# Patient Record
Sex: MALE | Race: BLACK OR AFRICAN AMERICAN | Employment: UNEMPLOYED | URBAN - METROPOLITAN AREA
[De-identification: names, ages, dates, MRNs, and addresses within clinical notes are randomized per-mention and may not be internally consistent; named-entity substitution may affect disease eponyms.]

---

## 2017-08-16 ENCOUNTER — TELEPHONE (OUTPATIENT)
Dept: TRANSPLANT | Facility: CLINIC | Age: 27
End: 2017-08-16

## 2017-08-16 DIAGNOSIS — I50.9 CONGESTIVE HEART FAILURE, UNSPECIFIED CONGESTIVE HEART FAILURE CHRONICITY, UNSPECIFIED CONGESTIVE HEART FAILURE TYPE: Primary | ICD-10-CM

## 2017-08-16 NOTE — TELEPHONE ENCOUNTER
"REFERRAL NOTE:    Levon Arizmendi has been referred to the pre-heart transplant office for consideration for orthotopic heart transplantation by Dr. Alejandra Alonso. Patient's appointments have been scheduled for 08/28/17.  Information was provided and questions were answered.  Copies of "What to Expect", appointment letter and directions to [facility]/ Transplant Clinic  were mailed to the patient. My contact information was given. Pleasant and polite. Call PRN.  "

## 2017-08-17 ENCOUNTER — TELEPHONE (OUTPATIENT)
Dept: TRANSPLANT | Facility: CLINIC | Age: 27
End: 2017-08-17

## 2017-08-17 NOTE — TELEPHONE ENCOUNTER
Spoke to Nesha of [facility]. Informed of patient's upcoming appointment 08/28/17.

## 2017-08-28 ENCOUNTER — INITIAL CONSULT (OUTPATIENT)
Dept: TRANSPLANT | Facility: CLINIC | Age: 27
End: 2017-08-28
Payer: MEDICAID

## 2017-08-28 ENCOUNTER — HOSPITAL ENCOUNTER (OUTPATIENT)
Dept: PULMONOLOGY | Facility: CLINIC | Age: 27
Discharge: HOME | End: 2017-08-28
Payer: MEDICAID

## 2017-08-28 VITALS
DIASTOLIC BLOOD PRESSURE: 86 MMHG | BODY MASS INDEX: 50.23 KG/M2 | SYSTOLIC BLOOD PRESSURE: 135 MMHG | HEIGHT: 63 IN | HEART RATE: 97 BPM | WEIGHT: 283.5 LBS

## 2017-08-28 VITALS — HEIGHT: 63 IN | BODY MASS INDEX: 50.14 KG/M2 | WEIGHT: 283 LBS

## 2017-08-28 DIAGNOSIS — E66.01 MORBID OBESITY DUE TO EXCESS CALORIES: ICD-10-CM

## 2017-08-28 DIAGNOSIS — I10 ESSENTIAL HYPERTENSION: ICD-10-CM

## 2017-08-28 DIAGNOSIS — Z76.82 ORGAN TRANSPLANT CANDIDATE: ICD-10-CM

## 2017-08-28 DIAGNOSIS — I50.22 CHRONIC SYSTOLIC CONGESTIVE HEART FAILURE, NYHA CLASS 3: ICD-10-CM

## 2017-08-28 DIAGNOSIS — I50.22 CHRONIC SYSTOLIC CONGESTIVE HEART FAILURE, NYHA CLASS 3: Primary | ICD-10-CM

## 2017-08-28 DIAGNOSIS — G47.33 OSA (OBSTRUCTIVE SLEEP APNEA): ICD-10-CM

## 2017-08-28 PROCEDURE — 94620 PR PULMONARY STRESS TESTING,SIMPLE: CPT | Mod: 26,S$PBB,TXP, | Performed by: INTERNAL MEDICINE

## 2017-08-28 PROCEDURE — 99205 OFFICE O/P NEW HI 60 MIN: CPT | Mod: S$PBB,TXP,, | Performed by: INTERNAL MEDICINE

## 2017-08-28 PROCEDURE — 94620 PR PULMONARY STRESS TESTING,SIMPLE: CPT | Mod: PBBFAC,TXP | Performed by: INTERNAL MEDICINE

## 2017-08-28 PROCEDURE — 3008F BODY MASS INDEX DOCD: CPT | Mod: TXP,,, | Performed by: INTERNAL MEDICINE

## 2017-08-28 PROCEDURE — 99999 PR PBB SHADOW E&M-EST. PATIENT-LVL III: CPT | Mod: PBBFAC,TXP,, | Performed by: INTERNAL MEDICINE

## 2017-08-28 RX ORDER — LISINOPRIL 40 MG/1
40 TABLET ORAL DAILY
COMMUNITY
Start: 2017-07-10 | End: 2017-09-12 | Stop reason: ALTCHOICE

## 2017-08-28 RX ORDER — SPIRONOLACTONE 25 MG/1
25 TABLET ORAL DAILY
Status: ON HOLD | COMMUNITY
Start: 2017-07-10 | End: 2018-01-01 | Stop reason: HOSPADM

## 2017-08-28 RX ORDER — NAPROXEN SODIUM 220 MG/1
81 TABLET, FILM COATED ORAL DAILY
Status: ON HOLD | COMMUNITY
Start: 2017-07-10 | End: 2018-01-01 | Stop reason: HOSPADM

## 2017-08-28 RX ORDER — AMLODIPINE BESYLATE 10 MG/1
10 TABLET ORAL DAILY
COMMUNITY
Start: 2017-03-02 | End: 2017-11-03 | Stop reason: ALTCHOICE

## 2017-08-28 RX ORDER — LATANOPROST 50 UG/ML
1 SOLUTION/ DROPS OPHTHALMIC NIGHTLY
COMMUNITY
Start: 2017-01-11 | End: 2018-01-01

## 2017-08-28 RX ORDER — PERMETHRIN 50 MG/G
CREAM TOPICAL ONCE
COMMUNITY
End: 2017-10-17

## 2017-08-28 RX ORDER — CARVEDILOL 12.5 MG/1
25 TABLET ORAL 2 TIMES DAILY
COMMUNITY
Start: 2017-07-26 | End: 2017-10-20 | Stop reason: SDUPTHER

## 2017-08-28 RX ORDER — LORATADINE 10 MG/1
10 TABLET ORAL DAILY
Status: ON HOLD | COMMUNITY
Start: 2017-03-02 | End: 2018-01-01 | Stop reason: HOSPADM

## 2017-08-28 RX ORDER — NITROGLYCERIN 0.4 MG/1
TABLET SUBLINGUAL
Status: ON HOLD | COMMUNITY
Start: 2017-07-06 | End: 2018-01-01 | Stop reason: HOSPADM

## 2017-08-28 RX ORDER — FLUTICASONE PROPIONATE 50 MCG
1 SPRAY, SUSPENSION (ML) NASAL DAILY
COMMUNITY
Start: 2017-08-23 | End: 2017-09-22

## 2017-08-28 RX ORDER — FUROSEMIDE 20 MG/1
40 TABLET ORAL DAILY
COMMUNITY
Start: 2017-07-26 | End: 2017-10-20 | Stop reason: SDUPTHER

## 2017-08-28 RX ORDER — GABAPENTIN 400 MG/1
400 CAPSULE ORAL 3 TIMES DAILY
Status: ON HOLD | COMMUNITY
Start: 2017-03-02 | End: 2018-01-01 | Stop reason: HOSPADM

## 2017-08-28 NOTE — PROGRESS NOTES
PRE-EDUCATION BOOKLET NOTE:    Met with Levon Arizmendi and his aunt and had a brief discussion on the heart transplant evaluation process.    Heart Transplant Educational Booklet given to patient, which included the following handouts:  · Cardiomyopathy and Heart Transplantation  · Pre-transplant Evaluation Process  · Ventricular Assist Devices  · Wellness Contract  · Heart Transplant Information Outline  · Recipient Informed Consent  · Discharge Instructions for Patients with Heart Failure  · Advanced Directives  · Suggested web sites  · Multiple listing protocol and UNOS toll free numbers    Contact information provided.  All questions answered to patient's satisfaction.   Patient instructed to bring heart transplant educational booklet to his next visit.       Plan is for risk stratification testing in a few weeks.

## 2017-08-28 NOTE — PROGRESS NOTES
Subjective:   Initial evaluation of heart transplant candidacy.    HPI:  Mr. Arizmendi is a 27 y.o. 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 He was first diagnosed with CMP in 2009 he things. He also has HTN, KAMILLE (on CPAP), HLD, Migraines, and h/o syncope in 2012. He does not have an ICD but does have a life vest for with a f/u appt with EP for ICD possible implant. He had a MPS 3/2017 concerning for ischemic defect but coronary angiography revealed normal coronaries 5/2017. He was last admitted 7/7-7/10 of this year with a NSTEMI. Repeat LHC again showed normal coronaries. His EF has been declining despite GDMT and he was referred to us for evaluation for advanced options. He lives alone at home but has aunts and siblings who live in [place] with him. He used to walk regularly but has cut back due to being told not to stress his body. He has occasional chest pain/pressure that occurs every few months. He has been admitted 3x this year for CP. In two of those admissions he underwent LHC with normal coronaries as above. He reports currently having stable 3-pillow orthopnea, no PND, no syncope since 2012, no CP today; He reports occasional wt fluctuations but takes an extra lasix if his legs swell too much. He denies nausea or abd pain. No other complaints.   --Of note, he did not take any of his meds this am    Echo 8/1/17:  - EF 20-25%  - LVEDD 7.1cm  - Mod-severe MR    [facility] 7/2017:  - Normal coronaries  - LVEDP 22mmhg      Past Medical History:   Diagnosis Date    Cardiomyopathy     Hyperlipidemia     Hypertension     Obesity     Systolic heart failure secondary to idiopathic cardiomyopathy      History reviewed. No pertinent surgical history.    Family History   Problem Relation Age of Onset    Heart disease Maternal Grandmother     Heart disease Maternal Grandfather       Social History  "    Social History    Marital status: Unknown     Spouse name: N/A    Number of children: N/A    Years of education: N/A     Social History Main Topics    Smoking status: Never Smoker    Smokeless tobacco: Never Used    Alcohol use No    Drug use: No    Sexual activity: Not Currently     Other Topics Concern    None     Social History Narrative    None       Review of Systems   Constitution: Negative for chills, fever, malaise/fatigue, night sweats and weight loss.   HENT: Negative for congestion, headaches and sore throat.    Eyes: Negative for blurred vision and visual disturbance.   Cardiovascular: Positive for dyspnea on exertion (NYHA Class III) and leg swelling. Negative for chest pain, near-syncope, orthopnea, palpitations, paroxysmal nocturnal dyspnea and syncope.   Respiratory: Negative for cough, shortness of breath and wheezing.    Endocrine: Negative for cold intolerance and heat intolerance.   Hematologic/Lymphatic: Negative for adenopathy.   Skin: Negative for itching and rash.   Musculoskeletal: Negative for arthritis, back pain, joint pain and myalgias.   Gastrointestinal: Negative for bloating, abdominal pain, change in bowel habit, nausea and vomiting.   Genitourinary: Negative for dysuria and hesitancy.   Neurological: Negative for dizziness, numbness and sensory change.   Psychiatric/Behavioral: Negative for altered mental status and depression.       Objective:   Blood pressure 135/86, pulse 97, height 5' 3" (1.6 m), weight 128.6 kg (283 lb 8.2 oz).body mass index is 50.22 kg/m².    Physical Exam   Constitutional: He is oriented to person, place, and time. He appears well-developed and well-nourished. No distress.   Obese   HENT:   Head: Normocephalic.   Mouth/Throat: Oropharynx is clear and moist.   Eyes: Pupils are equal, round, and reactive to light. No scleral icterus.   Neck: Normal range of motion. JVD (JVP ~9cm above RA) present. No thyromegaly present.   Cardiovascular: Normal " rate, regular rhythm and normal heart sounds.  Exam reveals no gallop and no friction rub.    No murmur heard.  Pulmonary/Chest: Effort normal. No respiratory distress. He has no wheezes. He has no rales.   Abdominal: Soft. He exhibits no distension. There is no tenderness. There is no rebound and no guarding.   Musculoskeletal: Normal range of motion. He exhibits no edema (trace pitting to knee bilaterally), tenderness or deformity.   Neurological: He is alert and oriented to person, place, and time. No cranial nerve deficit. He exhibits normal muscle tone. Coordination normal.   Skin: Skin is warm and dry. No rash noted. No erythema.   Psychiatric: He has a normal mood and affect.       Labs:      Chemistry        Component Value Date/Time     08/28/2017 0725    K 4.7 08/28/2017 0725     08/28/2017 0725    CO2 29 08/28/2017 0725    BUN 15 08/28/2017 0725    CREATININE 1.2 08/28/2017 0725     08/28/2017 0725        Component Value Date/Time    CALCIUM 9.5 08/28/2017 0725    ALKPHOS 47 (L) 08/28/2017 0725    AST 25 08/28/2017 0725    ALT 44 08/28/2017 0725    BILITOT 0.8 08/28/2017 0725    ESTGFRAFRICA >60.0 08/28/2017 0725    EGFRNONAA >60.0 08/28/2017 0725          No results found for: MG  Lab Results   Component Value Date    WBC 5.67 08/28/2017    HGB 15.3 08/28/2017    HCT 44.6 08/28/2017    MCV 80 (L) 08/28/2017     08/28/2017     BNP   Date Value Ref Range Status   08/28/2017 297 (H) 0 - 99 pg/mL Final     Comment:     Values of less than 100 pg/ml are consistent with non-CHF populations.     No results found for this or any previous visit.    Labs were reviewed with the patient.    Assessment:      1. Chronic systolic congestive heart failure, NYHA class 3    2. Morbid obesity due to excess calories    3. KAMILLE (obstructive sleep apnea)    4. Essential hypertension    5. Organ transplant candidate        Plan:   Pt appears euvolemic and compensated today. Labs stable with Scr 1.2  -  will order risk stratification testing today--6MWT, RHC, CPX  - will see in clinic 1-2 weeks after above  - there is some concern for chronic myocarditis given elevated troponin last month; will consider CMR as well though do not feel that it will change our management  - Has room today to up-titrate meds but did not take his meds today; I asked patient to take all his meds prior to next visit. We will see what HR/BP look like and adjust meds prn.    Patient is now NYHA III ACC stage D  Recommend 2 gram sodium restriction and 1500cc fluid restriction.  Encourage physical activity with graded exercise program.  Requested patient to weigh themselves daily, and to notify us if their weight increases by more than 3 lbs in 1 day or 5 lbs in 1 week.     Transplant Candidacy: Patient is a 27 y.o. year old male with heart failure is being seen for possible LVAD and OHT. In my opinion, he is  a marginal LVAD and OHT candidate. Patient did meet with MCS and/or pre-transplant coordinator at the end of this visit for workup. he is scheduled for risk stratification testing.    UNOS Patient Status  Functional Status: 80% - Normal activity with effort: some symptoms of disease  Physical Capacity: No Limitations  Working for Income: Unknown    Jose J Cheney MD

## 2017-08-28 NOTE — LETTER
August 28, 2017        Shane Alonso  [address] [facility] [address]  Phone: [phone]  Fax: [phone]             [facility] [address]  [place] [address]  Phone: [phone]   Patient: Levon Arizmendi   MR Number: [id number]   YOB: 1990   Date of Visit: 8/28/2017       Dear Dr. Shane Alonso    Thank you for referring Levon Arizmendi to me for evaluation. Attached you will find relevant portions of my assessment and plan of care.    If you have questions, please do not hesitate to call me. I look forward to following Levon Arizmendi along with you.    Sincerely,    Jose J Cheney MD    Enclosure    If you would like to receive this communication electronically, please contact [email or web address] or [phone] to request [employer] Link access.    Dis[employer] Link is a tool which provides read-only access to select patient information with whom you have a relationship. Its easy to use and provides real time access to review your patients record including encounter summaries, notes, results, and demographic information.    If you feel you have received this communication in error or would no longer like to receive these types of communications, please e-mail [email or web address]

## 2017-08-29 NOTE — PROCEDURES
Levon Arizmendi is a 27 y.o.  male patient, who presents for a 6 minute walk test ordered by MD. Shravan.  The diagnosis is Pulmonary Hypertension.  The patient's BMI is 50.2 kg/m2.  Predicted distance (lower limit of normal) is 518 meters.      Test Results:    The test was completed with stops.  The patient stopped 2 times for a total of 59 seconds.  The total time walked was 301 seconds.  During walking, the patient reported:  Dyspnea. The patient used no assistive devices  during testing.     08/28/2017---------Distance: 289.86 meters (951 feet)     O2 Sat % Supplemental Oxygen Heart Rate Blood Pressure Erica Scale   Pre-exercise  (Resting) 99 % Room Air 98 bpm 123/82 mmHg 0.5   During Exercise 98 % Room Air 93 bpm (!) 132/100 mmHg 3   Post-exercise  (Recovery) 99 % Room Air  116 bpm (!) 131/92 mmHg       Recovery Time: 56 seconds    Performing nurse/tech: ALBIN Ferrari      PREVIOUS STUDY:   The patient has not had a previous study.      CLINICAL INTERPRETATION:  Six minute walk distance is 289.86 meters (951 feet) with moderate dyspnea.  During exercise, there was no significant desaturation while breathing room air.  Both blood pressure and heart rate remained stable with walking.  Tachycardia was present prior to exercise.  The patient did not report non-pulmonary symptoms during exercise.  Significant exercise impairment is likely due to subjective symptoms.  The patient did complete the study, walking 301 seconds of the 360 second test.  No previous study performed.  Based upon age and body mass index, exercise capacity is less than predicted.

## 2017-09-12 ENCOUNTER — SURGERY (OUTPATIENT)
Age: 27
End: 2017-09-12

## 2017-09-12 ENCOUNTER — HOSPITAL ENCOUNTER (OUTPATIENT)
Dept: CARDIOLOGY | Facility: CLINIC | Age: 27
Discharge: HOME | End: 2017-09-12
Payer: MEDICAID

## 2017-09-12 ENCOUNTER — HOSPITAL ENCOUNTER (OUTPATIENT)
Facility: HOSPITAL | Age: 27
Discharge: HOME | End: 2017-09-12
Attending: INTERNAL MEDICINE | Admitting: INTERNAL MEDICINE
Payer: MEDICAID

## 2017-09-12 ENCOUNTER — OFFICE VISIT (OUTPATIENT)
Dept: TRANSPLANT | Facility: CLINIC | Age: 27
End: 2017-09-12
Payer: MEDICAID

## 2017-09-12 VITALS
BODY MASS INDEX: 49.26 KG/M2 | SYSTOLIC BLOOD PRESSURE: 132 MMHG | HEIGHT: 63 IN | DIASTOLIC BLOOD PRESSURE: 83 MMHG | HEART RATE: 85 BPM | WEIGHT: 278 LBS

## 2017-09-12 DIAGNOSIS — E66.01 MORBID OBESITY DUE TO EXCESS CALORIES: ICD-10-CM

## 2017-09-12 DIAGNOSIS — I50.42 CHRONIC COMBINED SYSTOLIC AND DIASTOLIC HEART FAILURE: ICD-10-CM

## 2017-09-12 DIAGNOSIS — Z76.82 ORGAN TRANSPLANT CANDIDATE: ICD-10-CM

## 2017-09-12 DIAGNOSIS — E66.01 MORBID (SEVERE) OBESITY DUE TO EXCESS CALORIES: ICD-10-CM

## 2017-09-12 DIAGNOSIS — I50.22 CHRONIC SYSTOLIC CONGESTIVE HEART FAILURE, NYHA CLASS 3: ICD-10-CM

## 2017-09-12 DIAGNOSIS — I50.22 CHRONIC SYSTOLIC CONGESTIVE HEART FAILURE, NYHA CLASS 3: Primary | ICD-10-CM

## 2017-09-12 DIAGNOSIS — I10 ESSENTIAL HYPERTENSION: ICD-10-CM

## 2017-09-12 PROBLEM — I42.0 COCM (CONGESTIVE CARDIOMYOPATHY): Status: ACTIVE | Noted: 2017-09-12

## 2017-09-12 LAB — DIASTOLIC DYSFUNCTION: NO

## 2017-09-12 PROCEDURE — 99213 OFFICE O/P EST LOW 20 MIN: CPT | Mod: PBBFAC,TXP,25 | Performed by: INTERNAL MEDICINE

## 2017-09-12 PROCEDURE — G0378 HOSPITAL OBSERVATION PER HR: HCPCS | Mod: NTX

## 2017-09-12 PROCEDURE — 94621 CARDIOPULM EXERCISE TESTING: CPT | Mod: PBBFAC,NTX | Performed by: INTERNAL MEDICINE

## 2017-09-12 PROCEDURE — 93451 RIGHT HEART CATH: CPT | Mod: 26,NTX,, | Performed by: INTERNAL MEDICINE

## 2017-09-12 PROCEDURE — 99999 PR PBB SHADOW E&M-EST. PATIENT-LVL III: CPT | Mod: PBBFAC,TXP,, | Performed by: INTERNAL MEDICINE

## 2017-09-12 PROCEDURE — 25000003 PHARM REV CODE 250: Mod: TXP

## 2017-09-12 PROCEDURE — 3008F BODY MASS INDEX DOCD: CPT | Mod: NTX,,, | Performed by: INTERNAL MEDICINE

## 2017-09-12 PROCEDURE — C1894 INTRO/SHEATH, NON-LASER: HCPCS | Mod: NTX

## 2017-09-12 PROCEDURE — 99214 OFFICE O/P EST MOD 30 MIN: CPT | Mod: S$PBB,NTX,, | Performed by: INTERNAL MEDICINE

## 2017-09-12 NOTE — PROGRESS NOTES
Subjective:    Patient ID:  Levon Arizmendi is a 27 y.o. male who presents for follow up of NiCMP with chronic systolic HF.     CC: Dyspnea on exertion    HPI:  Mr. Arizmendi is a 27 y.o. 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 He was first diagnosed with CMP in 2009 he thinks. He also has HTN, KAMILLE (on CPAP), HLD, Migraines, and h/o syncope in 2012. He does not have an ICD but does have a life vest for with a f/u appt with EP for possible ICD implant. He had a MPS 3/2017 concerning for ischemic defect but coronary angiography revealed normal coronaries 5/2017. He was last admitted 7/7-7/10 of this year with a NSTEMI. Repeat LHC again showed normal coronaries. His EF has been declining despite GDMT and he was referred to us for evaluation for advanced options. He was last seen by me 8/28/17 for initial evaluation. He is set up for risk stratification testing today. He's had his CPX but RHC is this afternoon.     Since last visit, he reports doing well today. He has stable 2-pillow orthopnea, no PND, no syncope since 2012, no CP today; He reports occasional wt fluctuations but takes an extra lasix if his legs swell too much. He had some +n/v yesterday but none today; No f/c, not sure if it was because of nerves about today or something he ate. No other complaints.     Echo 8/1/17:  - EF 20-25%  - LVEDD 7.1cm  - Mod-severe MR    C 7/2017:  - Normal coronaries  - LVEDP 22mmhg    RHC today: pending    CPX today:  Metabolic Findings:  1)  Resting spirometry reveals an FVC = 2.8L which is 72% of predicted, an FEV1 of 2.2L, which is 67% of predicted and an FEV1/FVC ratio of 79%. The MVV = 89 L/min, which is 56% of predicted.  2) The respiratory exchange ratio (RER) was 0.90, suggesting poor effort.  3) The breathing reserve is calculated at 7%, which is reduced. Oxygen saturation with exercise became reduced  "reaching a elicia of 88% from a baseline value of 96%.   4) The PkVO2 was 17.4 ml/kg/min which is 39% of predicted equating to a functional capacity of 5.0 METS indicating severe functional impairment.   5) The anaerobic threshold (AT), which occurred at a heart rate of 122bpm, was 13.8 ml/kg/min, which is 31% of the predicted VO2 and is reduced.   6) The PkVO2 Lean was 22.89 ml/kg of lean body weight/min indicating a good prognosis in heart failure.   7) The VE/VCO2 decreased by -5% from rest to AT. The VE/VCO2 [place] was 35.4.  The Resting PetCO2 was 34.9.      CPX Conclusions:  -Severe functional impairment associated with a reduced breathing reserve, reduced oxygen saturation with exercise, poor effort, and a reduced AT. These findings are indicative of functional impairment secondary to circulatory insufficiency and poor   effort.       Review of Systems   Constitution: Negative for chills, fever, malaise/fatigue, night sweats and weight loss.   HENT: Negative for congestion and sore throat.    Eyes: Negative for blurred vision and visual disturbance.   Cardiovascular: Positive for dyspnea on exertion (NYHA Class III) and leg swelling. Negative for chest pain, near-syncope, orthopnea, palpitations, paroxysmal nocturnal dyspnea and syncope.   Respiratory: Negative for cough, shortness of breath and wheezing.    Endocrine: Negative for cold intolerance and heat intolerance.   Hematologic/Lymphatic: Negative for adenopathy.   Skin: Negative for itching and rash.   Musculoskeletal: Negative for arthritis, back pain, joint pain and myalgias.   Gastrointestinal: Negative for bloating, abdominal pain, change in bowel habit, nausea and vomiting.   Genitourinary: Negative for dysuria and hesitancy.   Neurological: Negative for dizziness, headaches, numbness and sensory change.   Psychiatric/Behavioral: Negative for altered mental status and depression.       Objective:   Blood pressure 132/83, pulse 85, height 5' 3" (1.6 " m), weight 126.1 kg (278 lb).body mass index is 49.25 kg/m².    Physical Exam   Constitutional: He is oriented to person, place, and time. He appears well-developed and well-nourished. No distress.   Obese   HENT:   Head: Normocephalic.   Mouth/Throat: Oropharynx is clear and moist.   Eyes: Pupils are equal, round, and reactive to light. No scleral icterus.   Neck: Normal range of motion. JVD (JVP ~6cm above RA) present. No thyromegaly present.   Cardiovascular: Normal rate, regular rhythm and normal heart sounds.  Exam reveals no gallop and no friction rub.    No murmur heard.  Pulmonary/Chest: Effort normal. No respiratory distress. He has no wheezes. He has no rales.   Abdominal: Soft. He exhibits no distension. There is no tenderness. There is no rebound and no guarding.   Musculoskeletal: Normal range of motion. He exhibits no edema, tenderness or deformity.   Neurological: He is alert and oriented to person, place, and time. No cranial nerve deficit. He exhibits normal muscle tone. Coordination normal.   Skin: Skin is warm and dry. No rash noted. No erythema.   Psychiatric: He has a normal mood and affect.       Labs:      Chemistry        Component Value Date/Time     09/12/2017 0749    K 4.0 09/12/2017 0749     09/12/2017 0749    CO2 27 09/12/2017 0749    BUN 18 09/12/2017 0749    CREATININE 1.3 09/12/2017 0749    GLU 87 09/12/2017 0749        Component Value Date/Time    CALCIUM 9.5 09/12/2017 0749    ALKPHOS 48 (L) 09/12/2017 0749    AST 23 09/12/2017 0749    ALT 33 09/12/2017 0749    BILITOT 0.7 09/12/2017 0749    ESTGFRAFRICA >60.0 09/12/2017 0749    EGFRNONAA >60.0 09/12/2017 0749          No results found for: MG  Lab Results   Component Value Date    WBC 6.42 09/12/2017    HGB 14.2 09/12/2017    HCT 41.1 09/12/2017    MCV 80 (L) 09/12/2017     09/12/2017     BNP   Date Value Ref Range Status   09/12/2017 203 (H) 0 - 99 pg/mL Final     Comment:     Values of less than 100 pg/ml are  consistent with non-CHF populations.   08/28/2017 297 (H) 0 - 99 pg/mL Final     Comment:     Values of less than 100 pg/ml are consistent with non-CHF populations.     No results found for this or any previous visit.    Labs were reviewed with the patient.    Assessment:      1. Chronic systolic congestive heart failure, NYHA class 3    2. Essential hypertension    3. Morbid obesity due to excess calories    4. Organ transplant candidate        Plan:   Pt appears euvolemic and compensated today. Labs stable with Scr 1.3  - will order risk stratification testing today--RHC scheduled for later today   - CPX with poor effort, PkVO2 17, Ve/VCO2 slope 35  - BP and HR up today; will d/c lisinopril and start high-dose Entresto 97/103 BID--instructed to not start until tomorrow night (last dose of lisinopril this am) to allow for appropriate 36hour washout; educated on side effects  - will f/u RHC from today but plan to see back in 3 months for possible further medication titration  - discussed at length his need to lose weight as he is currently not a candidate for OHT and marginal for LVAD given his obesity; we discussed diet and graded exercise to help improve this; he voiced understanding and will try to work on it    Return in about 10 weeks (around 11/21/2017).    Patient is now NYHA III ACC stage D  Recommend 2 gram sodium restriction and 1500cc fluid restriction.  Encourage physical activity with graded exercise program.  Requested patient to weigh themselves daily, and to notify us if their weight increases by more than 3 lbs in 1 day or 5 lbs in 1 week.     Transplant Candidacy: Patient is a 27 y.o. year old male with heart failure is being seen for possible LVAD and OHT. In my opinion, he is  a marginal LVAD and OHT candidate due to obesity. Patient did meet with MCS and/or pre-transplant coordinator at the end of this visit for workup. he is scheduled for risk stratification testing.    OS Patient  Status  Functional Status: 80% - Normal activity with effort: some symptoms of disease  Physical Capacity: No Limitations  Working for Income: Unknown    Jose J Cheney MD

## 2017-09-12 NOTE — PATIENT INSTRUCTIONS
Stop taking Lisinopril  Start taking Entresto (Sacubitril-Valsartan) 97/103mg twice daily--first dose Wednesday night (9/13 PM)

## 2017-09-12 NOTE — OP NOTE
RHC Lab Post Procedure Note    Patient tolerated the procedure well. There were no complications. Please see full report in [facility] for details.

## 2017-09-12 NOTE — DISCHARGE SUMMARY
[facility]  Discharge Note  Short Stay    Admit Date: 9/12/2017    Discharge Date and Time: 9/12/17    Attending Physician: Umer Muniz Jr.,*     Discharge Provider: Umer Muniz Jr    Diagnoses:  Active Hospital Problems    Diagnosis  POA    *Chronic systolic congestive heart failure, NYHA class 3 [I50.22]  Yes    COCM (congestive cardiomyopathy) [I42.0]  Yes    Essential hypertension [I10]  Yes    Morbid obesity due to excess calories [E66.01]  Yes    Organ transplant candidate [Z76.82]  Not Applicable    KAMILLE (obstructive sleep apnea) [G47.33]  Yes      Resolved Hospital Problems    Diagnosis Date Resolved POA   No resolved problems to display.       Discharged Condition: stable    Hospital Course: Patient was admitted for an outpatient procedure and tolerated the procedure well with no complications.    Final Diagnoses: Same as principal problem.    Disposition: Home or Self Care    Follow up/Patient Instructions:    Medications:  Reconciled Home Medications:   Current Discharge Medication List      CONTINUE these medications which have NOT CHANGED    Details   amlodipine (NORVASC) 10 MG tablet Take 10 mg by mouth once daily.      aspirin 81 MG Chew Take 81 mg by mouth once daily.      carvedilol (COREG) 12.5 MG tablet Take 12.5 mg by mouth 2 (two) times daily.      fluticasone (FLONASE) 50 mcg/actuation nasal spray 1 spray by Nasal route once daily.      furosemide (LASIX) 20 MG tablet Take 20 mg by mouth once daily.      gabapentin (NEURONTIN) 400 MG capsule Take 400 mg by mouth 3 (three) times daily.      latanoprost 0.005 % ophthalmic solution Apply 1 drop to eye every evening.      loratadine (CLARITIN) 10 mg tablet Take 10 mg by mouth once daily.      nitroGLYCERIN (NITROSTAT) 0.4 MG SL tablet DESIRE EVERY 5 MINUTES AS NEEDED FOR CHEST PAINS      permethrin (ELIMITE) 5 % cream Apply topically once.      ranitidine (ZANTAC) 150 MG tablet Take 150 mg by mouth once daily.       sacubitril-valsartan (ENTRESTO)  mg per tablet Take 1 tablet by mouth 2 (two) times daily.  Qty: 60 tablet, Refills: 11    Associated Diagnoses: Chronic systolic congestive heart failure, NYHA class 3      spironolactone (ALDACTONE) 25 MG tablet Take 25 mg by mouth once daily.           No discharge procedures on file.  Follow-up Information     Jose J Cheney MD. Call in 2 days.    Specialty:  Cardiology  Contact information:  [facility] [address]  [phone]                   Resume previous diet and activity; continue f/u with your physicians as directed prior to this procedure

## 2017-09-12 NOTE — DISCHARGE INSTRUCTIONS
AFTER THE PROCEDURE:   -DO NOT DRINK OR EAT ANYTHING UNTIL NO LONGER HOARSE   -You may remove the bandage in 24 hours and wash with soap and water.   -You may shower, but do not soak in a tub for three days.   PRECAUTIONS FOR THE NEXT 24 HOURS:   -If you need to cough, sneeze, have a bowel movement, or bear down, hold pressure over your bandage.   -Do not  anything heavier than a gallon of milk(about 5 pounds)   -Avoid excessive bending over.   SYMPTOMS TO WATCH FOR AND REPORT TO YOUR DOCTOR:   -BLEEDING: hold pressure over the site until bleeding stops. Proceed to Emergency Room by ambulance (do not drive yourself) if unable to stop bleeding. Notify your doctor.   -HEMATOMA(hard bruise under the skin): Gelacio around the bruise if one develops. Call your doctor if it increases in size or if you have difficulty talking, swallowing, breathing or anything unusual.   SIGNS OF INFECTION:Fever (temperature over 100.5 F), pus or redness   -RASH   -CHEST PAIN OR SHORTNESS OF BREATH   You may call you coordinator in the Heart Failure/Heart Transplant/Pulmonary Hypertension Clinic at [phone] during normal business hours(Monday through Friday from 8 A.M. to 5 P.M.) After hours, call the Heart Transplant Service doctor on call at [phone].

## 2017-09-12 NOTE — H&P
Interval H&P  See Dr. Cheney's original consult and note from today  49 yo AAM referred for RHC by Dr. Lizarraga and please see his complete note from 9/6/17.  Patient reports no change in symptoms since.  He is wearing a Lifevest upon arrival.  Past History and Operation reviewed  Meds reviewed  Allergies reviewed  Vital signs per nurse's notes  JVP not seen sitting  Extr: No edema  Lab Results   Component Value Date     (H) 09/12/2017     09/12/2017    K 4.0 09/12/2017     09/12/2017    CO2 27 09/12/2017    BUN 18 09/12/2017    CREATININE 1.3 09/12/2017    GLU 87 09/12/2017    AST 23 09/12/2017    ALT 33 09/12/2017    ALBUMIN 3.7 09/12/2017    PROT 7.4 09/12/2017    BILITOT 0.7 09/12/2017    WBC 6.42 09/12/2017    HGB 14.2 09/12/2017    HCT 41.1 09/12/2017     09/12/2017    INR 1.0 09/12/2017       ASSES;  CHF  Obesity    PLAN:  Proceed with RHC  This procedure has been fully reviewed with the patient and written informed consent has been obtained.

## 2017-09-12 NOTE — LETTER
September 12, 2017        Shane Alonso  [address] [facility] [address]  Phone: [phone]  Fax: [phone]             [facility] [address]  [place] [address]  Phone: [phone]   Patient: Levon Arizmendi   MR Number: [id number]   YOB: 1990   Date of Visit: 9/12/2017       Dear Dr. Shane Alonso    Thank you for referring Levon Arizmendi to me for evaluation. Attached you will find relevant portions of my assessment and plan of care.    If you have questions, please do not hesitate to call me. I look forward to following Levon Arizmendi along with you.    Sincerely,    Jose J Cheney MD    Enclosure    If you would like to receive this communication electronically, please contact [email or web address] or [phone] to request [employer] access.    [employer] is a tool which provides read-only access to select patient information with whom you have a relationship. Its easy to use and provides real time access to review your patients record including encounter summaries, notes, results, and demographic information.    If you feel you have received this communication in error or would no longer like to receive these types of communications, please e-mail [email or web address]

## 2017-10-17 ENCOUNTER — OFFICE VISIT (OUTPATIENT)
Dept: TRANSPLANT | Facility: CLINIC | Age: 27
End: 2017-10-17
Payer: MEDICAID

## 2017-10-17 ENCOUNTER — LAB VISIT (OUTPATIENT)
Dept: LAB | Facility: HOSPITAL | Age: 27
End: 2017-10-17
Attending: INTERNAL MEDICINE
Payer: MEDICAID

## 2017-10-17 VITALS
WEIGHT: 263 LBS | HEART RATE: 84 BPM | HEIGHT: 63 IN | BODY MASS INDEX: 46.6 KG/M2 | SYSTOLIC BLOOD PRESSURE: 114 MMHG | DIASTOLIC BLOOD PRESSURE: 72 MMHG

## 2017-10-17 DIAGNOSIS — E66.01 MORBID OBESITY DUE TO EXCESS CALORIES: ICD-10-CM

## 2017-10-17 DIAGNOSIS — I42.0 COCM (CONGESTIVE CARDIOMYOPATHY): ICD-10-CM

## 2017-10-17 DIAGNOSIS — I10 ESSENTIAL HYPERTENSION: ICD-10-CM

## 2017-10-17 DIAGNOSIS — G47.33 OSA (OBSTRUCTIVE SLEEP APNEA): ICD-10-CM

## 2017-10-17 DIAGNOSIS — I50.22 CHRONIC SYSTOLIC CONGESTIVE HEART FAILURE, NYHA CLASS 3: Primary | ICD-10-CM

## 2017-10-17 DIAGNOSIS — I50.22 CHRONIC SYSTOLIC CONGESTIVE HEART FAILURE, NYHA CLASS 3: ICD-10-CM

## 2017-10-17 LAB
ALBUMIN SERPL BCP-MCNC: 3.7 G/DL
ALP SERPL-CCNC: 52 U/L
ALT SERPL W/O P-5'-P-CCNC: 41 U/L
ANION GAP SERPL CALC-SCNC: 15 MMOL/L
AST SERPL-CCNC: 25 U/L
BILIRUB SERPL-MCNC: 0.9 MG/DL
BNP SERPL-MCNC: 478 PG/ML
BUN SERPL-MCNC: 11 MG/DL
CALCIUM SERPL-MCNC: 9.8 MG/DL
CHLORIDE SERPL-SCNC: 105 MMOL/L
CO2 SERPL-SCNC: 23 MMOL/L
CREAT SERPL-MCNC: 1 MG/DL
EST. GFR  (AFRICAN AMERICAN): >60 ML/MIN/1.73 M^2
EST. GFR  (NON AFRICAN AMERICAN): >60 ML/MIN/1.73 M^2
GLUCOSE SERPL-MCNC: 90 MG/DL
POTASSIUM SERPL-SCNC: 3.9 MMOL/L
PROT SERPL-MCNC: 7.6 G/DL
SODIUM SERPL-SCNC: 143 MMOL/L

## 2017-10-17 PROCEDURE — 99999 PR PBB SHADOW E&M-EST. PATIENT-LVL III: CPT | Mod: PBBFAC,TXP,, | Performed by: INTERNAL MEDICINE

## 2017-10-17 PROCEDURE — 36415 COLL VENOUS BLD VENIPUNCTURE: CPT | Mod: TXP

## 2017-10-17 PROCEDURE — 80053 COMPREHEN METABOLIC PANEL: CPT | Mod: TXP

## 2017-10-17 PROCEDURE — 83880 ASSAY OF NATRIURETIC PEPTIDE: CPT | Mod: TXP

## 2017-10-17 PROCEDURE — 99214 OFFICE O/P EST MOD 30 MIN: CPT | Mod: S$PBB,NTX,, | Performed by: INTERNAL MEDICINE

## 2017-10-17 PROCEDURE — 99213 OFFICE O/P EST LOW 20 MIN: CPT | Mod: PBBFAC,TXP | Performed by: INTERNAL MEDICINE

## 2017-10-17 NOTE — PATIENT INSTRUCTIONS
Increase Carvedilol to 25mg twice daily  Increase Lasix to 40mg daily--but take 40mg twice daily for 3 days then once daily after that  Decrease Amlodipine to 5mg daily

## 2017-10-17 NOTE — PROGRESS NOTES
Subjective:    Patient ID:  Levon Arizmendi is a 27 y.o. male who presents for follow up of NiCMP with chronic systolic HF.     CC: Dyspnea on exertion    HPI:  Mr. Arizmendi is a 27 y.o. 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 He was first diagnosed with CMP in ~2009 he thinks. He also has HTN, KAMILLE (on CPAP), HLD, Migraines, and h/o syncope in 2012. He does not have an ICD but does have a life vest for with a f/u appt with EP for possible ICD implant. He had a MPS 3/2017 concerning for ischemic defect but coronary angiography revealed normal coronaries 5/2017. He was last admitted 7/7-7/10 of this year with a NSTEMI. Repeat LHC again showed normal coronaries. His EF has been declining despite GDMT and he was referred to us for evaluation for advanced options. He was last seen by me 9/12/17 with his risk stratification testing (see below). He is currently medical management only given his obesity.    Since last visit, he has lost 15lbs. He reports doing well today. He has stable 2-pillow orthopnea but does report coughing more at night recently. He denies PND, no syncope since 2012, no CP today; He reports occasional wt fluctuations but takes an extra lasix if his legs swell too much and feels much better from a cough/ROMERO standpoint. The filling pressures on his RHC were elevated.  No other complaints.     Echo 8/1/17:  - EF 20-25%  - LVEDD 7.1cm  - Mod-severe MR    [facility] 7/2017:  - Normal coronaries  - LVEDP 22mmhg    RHC today:   RA: 23/14 (14)  RV: 70/20  PW: 40/47 (38)  PA: 68/35 (46)  PA_SAT: 60  AO: 130/83 (99)  PW_SAT: 95  AO_SAT: 97    CONDITION 1 (9/12/2017 13:50:09):  FICKCI: 1.8600  FICKCO: 4.1300  PVR: 155.0000  SVR: 1646.0000    CPX today:  Metabolic Findings:  1)  Resting spirometry reveals an FVC = 2.8L which is 72% of predicted, an FEV1 of 2.2L, which is 67% of predicted and an FEV1/FVC  ratio of 79%. The MVV = 89 L/min, which is 56% of predicted.  2) The respiratory exchange ratio (RER) was 0.90, suggesting poor effort.  3) The breathing reserve is calculated at 7%, which is reduced. Oxygen saturation with exercise became reduced reaching a elicia of 88% from a baseline value of 96%.   4) The PkVO2 was 17.4 ml/kg/min which is 39% of predicted equating to a functional capacity of 5.0 METS indicating severe functional impairment.   5) The anaerobic threshold (AT), which occurred at a heart rate of 122bpm, was 13.8 ml/kg/min, which is 31% of the predicted VO2 and is reduced.   6) The PkVO2 Lean was 22.89 ml/kg of lean body weight/min indicating a good prognosis in heart failure.   7) The VE/VCO2 decreased by -5% from rest to AT. The VE/VCO2 [place] was 35.4.  The Resting PetCO2 was 34.9.      CPX Conclusions:  -Severe functional impairment associated with a reduced breathing reserve, reduced oxygen saturation with exercise, poor effort, and a reduced AT. These findings are indicative of functional impairment secondary to circulatory insufficiency and poor   effort.       Review of Systems   Constitution: Negative for chills, fever, malaise/fatigue, night sweats and weight loss.   HENT: Negative for congestion and sore throat.    Eyes: Negative for blurred vision and visual disturbance.   Cardiovascular: Positive for dyspnea on exertion (NYHA Class III) and leg swelling. Negative for chest pain, near-syncope, orthopnea, palpitations, paroxysmal nocturnal dyspnea and syncope.   Respiratory: Negative for cough, shortness of breath and wheezing.    Endocrine: Negative for cold intolerance and heat intolerance.   Hematologic/Lymphatic: Negative for adenopathy.   Skin: Negative for itching and rash.   Musculoskeletal: Negative for arthritis, back pain, joint pain and myalgias.   Gastrointestinal: Negative for bloating, abdominal pain, change in bowel habit, nausea and vomiting.   Genitourinary: Negative for  "dysuria and hesitancy.   Neurological: Negative for dizziness, headaches, numbness and sensory change.   Psychiatric/Behavioral: Negative for altered mental status and depression.       Objective:   Blood pressure 114/72, pulse 84, height 5' 3" (1.6 m), weight 119.3 kg (263 lb 0.1 oz).body mass index is 46.59 kg/m².    Physical Exam   Constitutional: He is oriented to person, place, and time. He appears well-developed and well-nourished. No distress.   Obese   HENT:   Head: Normocephalic.   Mouth/Throat: Oropharynx is clear and moist.   Eyes: Pupils are equal, round, and reactive to light. No scleral icterus.   Neck: Normal range of motion. JVD (JVP ~14cm above RA) present. No thyromegaly present.   Cardiovascular: Normal rate, regular rhythm and normal heart sounds.  Exam reveals no gallop and no friction rub.    No murmur heard.  Pulmonary/Chest: Effort normal. No respiratory distress. He has no wheezes. He has no rales.   Abdominal: Soft. He exhibits no distension. There is no tenderness. There is no rebound and no guarding.   Musculoskeletal: Normal range of motion. He exhibits no edema, tenderness or deformity.   Neurological: He is alert and oriented to person, place, and time. No cranial nerve deficit. He exhibits normal muscle tone. Coordination normal.   Skin: Skin is warm and dry. No rash noted. No erythema.   Psychiatric: He has a normal mood and affect.       Labs:      Chemistry        Component Value Date/Time     09/12/2017 0749    K 4.0 09/12/2017 0749     09/12/2017 0749    CO2 27 09/12/2017 0749    BUN 18 09/12/2017 0749    CREATININE 1.3 09/12/2017 0749    GLU 87 09/12/2017 0749        Component Value Date/Time    CALCIUM 9.5 09/12/2017 0749    ALKPHOS 48 (L) 09/12/2017 0749    AST 23 09/12/2017 0749    ALT 33 09/12/2017 0749    BILITOT 0.7 09/12/2017 0749    ESTGFRAFRICA >60.0 09/12/2017 0749    EGFRNONAA >60.0 09/12/2017 0749          No results found for: MG  Lab Results   Component " Value Date    WBC 6.42 09/12/2017    HGB 14.2 09/12/2017    HCT 41.1 09/12/2017    MCV 80 (L) 09/12/2017     09/12/2017     BNP   Date Value Ref Range Status   09/12/2017 203 (H) 0 - 99 pg/mL Final     Comment:     Values of less than 100 pg/ml are consistent with non-CHF populations.   08/28/2017 297 (H) 0 - 99 pg/mL Final     Comment:     Values of less than 100 pg/ml are consistent with non-CHF populations.     No results found for this or any previous visit.    Labs were reviewed with the patient.    Assessment:      1. Chronic systolic congestive heart failure, NYHA class 3    2. COCM (congestive cardiomyopathy)    3. Essential hypertension    4. Morbid obesity due to excess calories    5. KAMILLE (obstructive sleep apnea)        Plan:   - Pt appears volume-up on exam today but compensated.  - Labs stable with Scr 1.0  - CPX with poor effort, PkVO2 17, Ve/VCO2 slope 35; RHC with elevated filling pressures but moderately reduced CO/CI  - BP and HR with room to up-titrate today; will increase Coreg to 25mg twice daily. Will increase lasix to 40mg daily but told to to take 40mg po BID for 3 days then daily thereafter. Cont high-dose Entresto 97/103 BID, Aldactone 25mg daily  - decrease amlodipine to 5mg to ensure BP tolerates increase of Coreg; plan to wean off next visit and start Bidil if BP allows  - RTC 6 weeks for further medication titration  - discussed at length his need to lose weight as he is currently not a candidate for OHT and marginal for LVAD given his obesity; we discussed diet and graded exercise to help improve this; he voiced understanding and will try to work on it--he has lost 15lbs since last visit and is very motivated.    Return in about 6 weeks (around 11/28/2017).    Patient is now NYHA III ACC stage D  Recommend 2 gram sodium restriction and 1500cc fluid restriction.  Encourage physical activity with graded exercise program.  Requested patient to weigh themselves daily, and to notify  us if their weight increases by more than 3 lbs in 1 day or 5 lbs in 1 week.     Transplant Candidacy: Patient is a 27 y.o. year old male with heart failure is being seen for possible LVAD and OHT. In my opinion, he is  a marginal LVAD and OHT candidate due to obesity. Patient did meet with MCS and/or pre-transplant coordinator at the end of this visit for workup. he is scheduled for risk stratification testing.    UNOS Patient Status  Functional Status: 80% - Normal activity with effort: some symptoms of disease  Physical Capacity: No Limitations  Working for Income: Unknown    Jose J Cheney MD

## 2017-10-17 NOTE — LETTER
October 17, 2017        Shane Alonso  [address] [facility] [address]  Phone: [phone]  Fax: [phone]             [facility] [address]  [place] [address]  Phone: [phone]   Patient: Levon Arizmendi   MR Number: [id number]   YOB: 1990   Date of Visit: 10/17/2017       Dear Dr. Shane Alonso    Thank you for referring Levon Arizmendi to me for evaluation. Attached you will find relevant portions of my assessment and plan of care.    If you have questions, please do not hesitate to call me. I look forward to following Levon Arizmendi along with you.    Sincerely,    Jose J Cheney MD    Enclosure    If you would like to receive this communication electronically, please contact [email or web address] or [phone] to request [email or web address] Link access.    [email or web address] Link is a tool which provides read-only access to select patient information with whom you have a relationship. Its easy to use and provides real time access to review your patients record including encounter summaries, notes, results, and demographic information.    If you feel you have received this communication in error or would no longer like to receive these types of communications, please e-mail [email or web address]

## 2017-10-20 RX ORDER — FUROSEMIDE 20 MG/1
40 TABLET ORAL DAILY
Qty: 30 TABLET | Refills: 11 | Status: SHIPPED | OUTPATIENT
Start: 2017-10-20 | End: 2017-11-03 | Stop reason: SDUPTHER

## 2017-10-20 RX ORDER — CARVEDILOL 12.5 MG/1
25 TABLET ORAL 2 TIMES DAILY
Qty: 120 TABLET | Refills: 11 | Status: ON HOLD | OUTPATIENT
Start: 2017-10-20 | End: 2018-01-01 | Stop reason: HOSPADM

## 2017-10-20 NOTE — TELEPHONE ENCOUNTER
----- Message from Alejandra Vincenzoadam sent at 10/20/2017  9:47 AM CDT -----  Contact: pt   Pt called to have refills on the carvedilol (COREG) 12.5 MG tablet and the   furosemide (LASIX) 20 MG tablet sent to the [facility] [address] [place] [address] AT SEC of [facility] for a 90 day refill.  The pt can be reached @ [phone].  Thanks!!

## 2017-11-01 ENCOUNTER — TELEPHONE (OUTPATIENT)
Dept: TRANSPLANT | Facility: CLINIC | Age: 27
End: 2017-11-01

## 2017-11-01 NOTE — TELEPHONE ENCOUNTER
"Received call from pt stating he is more SOB and has increased cough. "I sometimes cough so hard it hurts and sometimes I have a little blood in it".  Questioned pt to see if he has gone to his local MD.  Per Pt, "I called but they can't see me until next month. Can I come see your doctor?  I need a 2 day notice to get a ride set up. "    appt made for 11/3/17 per pt request.   "

## 2017-11-03 ENCOUNTER — OFFICE VISIT (OUTPATIENT)
Dept: TRANSPLANT | Facility: CLINIC | Age: 27
End: 2017-11-03
Payer: MEDICAID

## 2017-11-03 ENCOUNTER — LAB VISIT (OUTPATIENT)
Dept: LAB | Facility: HOSPITAL | Age: 27
End: 2017-11-03
Attending: INTERNAL MEDICINE
Payer: MEDICAID

## 2017-11-03 VITALS
WEIGHT: 267 LBS | SYSTOLIC BLOOD PRESSURE: 137 MMHG | HEIGHT: 63 IN | BODY MASS INDEX: 47.31 KG/M2 | DIASTOLIC BLOOD PRESSURE: 82 MMHG | HEART RATE: 47 BPM

## 2017-11-03 DIAGNOSIS — I50.22 CHRONIC SYSTOLIC CONGESTIVE HEART FAILURE, NYHA CLASS 3: Primary | ICD-10-CM

## 2017-11-03 DIAGNOSIS — G47.33 OSA (OBSTRUCTIVE SLEEP APNEA): ICD-10-CM

## 2017-11-03 DIAGNOSIS — I50.22 CHRONIC SYSTOLIC CONGESTIVE HEART FAILURE, NYHA CLASS 3: ICD-10-CM

## 2017-11-03 DIAGNOSIS — E66.01 MORBID OBESITY DUE TO EXCESS CALORIES: ICD-10-CM

## 2017-11-03 DIAGNOSIS — I42.0 COCM (CONGESTIVE CARDIOMYOPATHY): ICD-10-CM

## 2017-11-03 DIAGNOSIS — I10 ESSENTIAL HYPERTENSION: ICD-10-CM

## 2017-11-03 LAB
ALBUMIN SERPL BCP-MCNC: 3.4 G/DL
ALP SERPL-CCNC: 42 U/L
ALT SERPL W/O P-5'-P-CCNC: 31 U/L
ANION GAP SERPL CALC-SCNC: 9 MMOL/L
AST SERPL-CCNC: 22 U/L
BILIRUB SERPL-MCNC: 1.9 MG/DL
BNP SERPL-MCNC: 1548 PG/ML
BUN SERPL-MCNC: 13 MG/DL
CALCIUM SERPL-MCNC: 9.2 MG/DL
CHLORIDE SERPL-SCNC: 106 MMOL/L
CO2 SERPL-SCNC: 26 MMOL/L
CREAT SERPL-MCNC: 1.2 MG/DL
EST. GFR  (AFRICAN AMERICAN): >60 ML/MIN/1.73 M^2
EST. GFR  (NON AFRICAN AMERICAN): >60 ML/MIN/1.73 M^2
GLUCOSE SERPL-MCNC: 98 MG/DL
POTASSIUM SERPL-SCNC: 3.7 MMOL/L
PROT SERPL-MCNC: 6.6 G/DL
SODIUM SERPL-SCNC: 141 MMOL/L

## 2017-11-03 PROCEDURE — 99999 PR PBB SHADOW E&M-EST. PATIENT-LVL III: CPT | Mod: PBBFAC,TXP,, | Performed by: INTERNAL MEDICINE

## 2017-11-03 PROCEDURE — 99213 OFFICE O/P EST LOW 20 MIN: CPT | Mod: PBBFAC,TXP | Performed by: INTERNAL MEDICINE

## 2017-11-03 PROCEDURE — 99214 OFFICE O/P EST MOD 30 MIN: CPT | Mod: S$PBB,NTX,, | Performed by: INTERNAL MEDICINE

## 2017-11-03 PROCEDURE — 36415 COLL VENOUS BLD VENIPUNCTURE: CPT | Mod: TXP

## 2017-11-03 PROCEDURE — 83880 ASSAY OF NATRIURETIC PEPTIDE: CPT | Mod: TXP

## 2017-11-03 PROCEDURE — 80053 COMPREHEN METABOLIC PANEL: CPT | Mod: TXP

## 2017-11-03 RX ORDER — FUROSEMIDE 40 MG/1
40 TABLET ORAL DAILY
Qty: 60 TABLET | Refills: 6 | Status: SHIPPED | OUTPATIENT
Start: 2017-11-03 | End: 2017-11-17 | Stop reason: SDUPTHER

## 2017-11-03 RX ORDER — HYDRALAZINE HYDROCHLORIDE 25 MG/1
25 TABLET, FILM COATED ORAL 3 TIMES DAILY
Qty: 90 TABLET | Refills: 11 | Status: ON HOLD | OUTPATIENT
Start: 2017-11-03 | End: 2018-01-01 | Stop reason: HOSPADM

## 2017-11-03 RX ORDER — ISOSORBIDE DINITRATE 20 MG/1
20 TABLET ORAL 3 TIMES DAILY
Qty: 90 TABLET | Refills: 11 | Status: ON HOLD | OUTPATIENT
Start: 2017-11-03 | End: 2018-01-01 | Stop reason: HOSPADM

## 2017-11-03 RX ORDER — PANTOPRAZOLE SODIUM 40 MG/1
40 TABLET, DELAYED RELEASE ORAL DAILY
Qty: 30 TABLET | Refills: 11 | Status: ON HOLD | OUTPATIENT
Start: 2017-11-03 | End: 2018-01-01 | Stop reason: HOSPADM

## 2017-11-03 NOTE — PATIENT INSTRUCTIONS
Stop Amlodipine  Start Hydralazine 25mg tablets, 3 times daily  Start Isosorbide dinitrate 20mg, 3 times daily  Stop Aspirin  Start Protonix 40mg twice daily for 1 week, then daily after that  Increase lasix: take 40mg 3 times daily for 3 days then 40mg twice daily thereafter; labs with cardiologist in [place] next week (JOSY)

## 2017-11-03 NOTE — PROGRESS NOTES
"Subjective:    Patient ID:  Levon Arizmendi is a 27 y.o. male who presents for follow up of NiCMP with chronic systolic HF.     CC: Dyspnea on exertion    HPI:  Mr. Arizmendi is a 27 y.o. 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 He was first diagnosed with CMP in ~2009 he thinks. He also has HTN, KAMILLE (on CPAP), HLD, Migraines, and h/o syncope in 2012. He does not have an ICD but does have a life vest for with a f/u appt with EP for possible ICD implant. He had a MPS 3/2017 concerning for ischemic defect but coronary angiography revealed normal coronaries 5/2017. He was last admitted 7/7-7/10 of this year with a NSTEMI. Repeat LHC again showed normal coronaries. His EF has been declining despite GDMT and he was referred to us for evaluation for advanced options. He was last seen by me 9/12/17 with his risk stratification testing (see below). He is currently medical management only given his obesity.    Today, he reports that his wt is stable (up 3lbs by our scales). He has stable 2-pillow orthopnea and reports coughing more at night recently. He also reports that over the last week he has had substernal discomfort that is worse when he tries to eat something. He has had difficulty swallowing and/or keeping anything down. He has tried solids and liquids but having difficulty keeping it down. He feels like the food gets "stuck" in his chest and later regurgitates it. Water stays down but that's about it. He went to [facility] 2 days ago for this and they gave him a breathing tx and hydrocodone and sent him home. The pain is gone but the dysphagia/odynophagia still present. He denies PND, no syncope since 2012, no CP today; No other complaints.     Echo 8/1/17:  - EF 20-25%  - LVEDD 7.1cm  - Mod-severe MR    [facility] 7/2017:  - Normal coronaries  - LVEDP 22mmhg    RHC today:   RA: 23/14 (14)  RV: 70/20  PW: " 40/47 (38)  PA: 68/35 (46)  PA_SAT: 60  AO: 130/83 (99)  PW_SAT: 95  AO_SAT: 97    CONDITION 1 (9/12/2017 13:50:09):  FICKCI: 1.8600  FICKCO: 4.1300  PVR: 155.0000  SVR: 1646.0000    CPX today:  Metabolic Findings:  1)  Resting spirometry reveals an FVC = 2.8L which is 72% of predicted, an FEV1 of 2.2L, which is 67% of predicted and an FEV1/FVC ratio of 79%. The MVV = 89 L/min, which is 56% of predicted.  2) The respiratory exchange ratio (RER) was 0.90, suggesting poor effort.  3) The breathing reserve is calculated at 7%, which is reduced. Oxygen saturation with exercise became reduced reaching a elicia of 88% from a baseline value of 96%.   4) The PkVO2 was 17.4 ml/kg/min which is 39% of predicted equating to a functional capacity of 5.0 METS indicating severe functional impairment.   5) The anaerobic threshold (AT), which occurred at a heart rate of 122bpm, was 13.8 ml/kg/min, which is 31% of the predicted VO2 and is reduced.   6) The PkVO2 Lean was 22.89 ml/kg of lean body weight/min indicating a good prognosis in heart failure.   7) The VE/VCO2 decreased by -5% from rest to AT. The VE/VCO2 [place] was 35.4.  The Resting PetCO2 was 34.9.      CPX Conclusions:  -Severe functional impairment associated with a reduced breathing reserve, reduced oxygen saturation with exercise, poor effort, and a reduced AT. These findings are indicative of functional impairment secondary to circulatory insufficiency and poor   effort.       Review of Systems   Constitution: Negative for chills, fever, malaise/fatigue, night sweats and weight loss.   HENT: Negative for congestion and sore throat.    Eyes: Negative for blurred vision and visual disturbance.   Cardiovascular: Positive for dyspnea on exertion (NYHA Class III) and leg swelling. Negative for chest pain, near-syncope, orthopnea, palpitations, paroxysmal nocturnal dyspnea and syncope.   Respiratory: Negative for cough, shortness of breath and wheezing.    Endocrine: Negative  "for cold intolerance and heat intolerance.   Hematologic/Lymphatic: Negative for adenopathy.   Skin: Negative for itching and rash.   Musculoskeletal: Negative for arthritis, back pain, joint pain and myalgias.   Gastrointestinal: Negative for bloating, abdominal pain, change in bowel habit, nausea and vomiting.   Genitourinary: Negative for dysuria and hesitancy.   Neurological: Negative for dizziness, headaches, numbness and sensory change.   Psychiatric/Behavioral: Negative for altered mental status and depression.       Objective:   Blood pressure 137/82, pulse (!) 47, height 5' 3" (1.6 m), weight 121.1 kg (266 lb 15.6 oz).body mass index is 47.29 kg/m².  HR-62 by auscultation    Physical Exam   Constitutional: He is oriented to person, place, and time. He appears well-developed and well-nourished. No distress.   Obese   HENT:   Head: Normocephalic.   Mouth/Throat: Oropharynx is clear and moist.   Eyes: Pupils are equal, round, and reactive to light. No scleral icterus.   Neck: Normal range of motion. JVD (JVP ~14cm above RA) present. No thyromegaly present.   Cardiovascular: Normal rate, regular rhythm and normal heart sounds.  Exam reveals no gallop and no friction rub.    No murmur heard.  Frequent ectopy   Pulmonary/Chest: Effort normal. No respiratory distress. He has no wheezes. He has no rales.   Abdominal: Soft. He exhibits no distension. There is no tenderness. There is no rebound and no guarding.   Musculoskeletal: Normal range of motion. He exhibits no edema, tenderness or deformity.   Neurological: He is alert and oriented to person, place, and time. No cranial nerve deficit. He exhibits normal muscle tone. Coordination normal.   Skin: Skin is warm and dry. No rash noted. No erythema.   Psychiatric: He has a normal mood and affect.       Labs:      Chemistry        Component Value Date/Time     11/03/2017 0756    K 3.7 11/03/2017 0756     11/03/2017 0756    CO2 26 11/03/2017 0756    BUN " 13 11/03/2017 0756    CREATININE 1.2 11/03/2017 0756    GLU 98 11/03/2017 0756        Component Value Date/Time    CALCIUM 9.2 11/03/2017 0756    ALKPHOS 42 (L) 11/03/2017 0756    AST 22 11/03/2017 0756    ALT 31 11/03/2017 0756    BILITOT 1.9 (H) 11/03/2017 0756    ESTGFRAFRICA >60.0 11/03/2017 0756    EGFRNONAA >60.0 11/03/2017 0756          No results found for: MG  Lab Results   Component Value Date    WBC 6.42 09/12/2017    HGB 14.2 09/12/2017    HCT 41.1 09/12/2017    MCV 80 (L) 09/12/2017     09/12/2017     BNP   Date Value Ref Range Status   10/17/2017 478 (H) 0 - 99 pg/mL Final     Comment:     Values of less than 100 pg/ml are consistent with non-CHF populations.   09/12/2017 203 (H) 0 - 99 pg/mL Final     Comment:     Values of less than 100 pg/ml are consistent with non-CHF populations.   08/28/2017 297 (H) 0 - 99 pg/mL Final     Comment:     Values of less than 100 pg/ml are consistent with non-CHF populations.     No results found for this or any previous visit.    Labs were reviewed with the patient.    Assessment:      1. Chronic systolic congestive heart failure, NYHA class 3    2. COCM (congestive cardiomyopathy)    3. Essential hypertension    4. Morbid obesity due to excess calories    5. KAMILLE (obstructive sleep apnea)        Plan:   Chronic combined HF, ACC Stage D, NYHA Class IIIb  - Pt appears volume-up on exam today but compensated.  - Scr slightly up today with Scr 1.2  - HR better with coreg 25mg bid; BP with room to up-titrate--> will start hydralazine 25mg TID and ISDN 20mg TID  - stop amlodipine  - Will increase lasix to 40mg BID from here on out--take TID for 3 days then BID thereafter; may need more; will get labs next week with EP cardiologist; likely needs potassium supplementation.   - Cont high-dose Entresto 97/103 BID  - Aldactone 25mg daily  - RTC 3 weeks for further medication titration and volume/potassium assessment (has appt for ICD 11/7)   - discussed at length his  need to lose weight as he is currently not a candidate for OHT and marginal for LVAD given his obesity; we discussed diet and graded exercise to help improve this; he voiced understanding and will try to work on it--he has lost 15lbs since last visit and is very motivated.    Dysphagia/Odynophagia  - volume can be contributing but the sensation of feeling food getting stuck with chest discomfort is concerning for esophageal pathology  - recommend GI consult in [place] for possible upper endoscopy    Return in about 3 weeks (around 11/24/2017).    Patient is now NYHA III ACC stage D  Recommend 2 gram sodium restriction and 1500cc fluid restriction.  Encourage physical activity with graded exercise program.  Requested patient to weigh themselves daily, and to notify us if their weight increases by more than 3 lbs in 1 day or 5 lbs in 1 week.     Transplant Candidacy: Patient is a 27 y.o. year old male with heart failure is being seen for possible LVAD and OHT. In my opinion, he is  a marginal LVAD and OHT candidate due to obesity. Patient did meet with MCS and/or pre-transplant coordinator at the end of this visit for workup. he is scheduled for risk stratification testing.    UNOS Patient Status  Functional Status: 80% - Normal activity with effort: some symptoms of disease  Physical Capacity: No Limitations  Working for Income: Unknown    Jose J Cheney MD

## 2017-11-03 NOTE — LETTER
November 3, 2017        Shane Alonso  [address] [facility] [address]  Phone: [phone]  Fax: [phone]             [facility] [address]  [place] [address]  Phone: [phone]   Patient: Levon Arizmendi   MR Number: [id number]   YOB: 1990   Date of Visit: 11/3/2017       Dear Dr. Shane Alonso    Thank you for referring Levon Arizmendi to me for evaluation. Attached you will find relevant portions of my assessment and plan of care.    If you have questions, please do not hesitate to call me. I look forward to following Levon Arizmendi along with you.    Sincerely,    Jose J Cheney MD    Enclosure    If you would like to receive this communication electronically, please contact [email or web address] or [phone] to request [employer] Link access.    [employer] Link is a tool which provides read-only access to select patient information with whom you have a relationship. Its easy to use and provides real time access to review your patients record including encounter summaries, notes, results, and demographic information.    If you feel you have received this communication in error or would no longer like to receive these types of communications, please e-mail [email or web address]

## 2017-11-11 ENCOUNTER — NURSE TRIAGE (OUTPATIENT)
Dept: ADMINISTRATIVE | Facility: CLINIC | Age: 27
End: 2017-11-11

## 2017-11-12 NOTE — TELEPHONE ENCOUNTER
"    Reason for Disposition   [1] Request for URGENT new prescription or refill of "essential" medication (i.e., likelihood of harm to patient if not taken) AND [2] triager unable to fill per unit policy    Protocols used: ST MEDICATION QUESTION CALL-A-AH    Patient is on the list for heart transplant and was calling because he thinks he has pneumonia. He is coughing and feels hot but temp is 99 degrees. Call transferred to  to connect patient with on call provider for Dr. Cheney because he was told to contact them about medications safe for him to take.   "

## 2017-11-17 DIAGNOSIS — I50.9 CONGESTIVE HEART FAILURE, UNSPECIFIED CONGESTIVE HEART FAILURE CHRONICITY, UNSPECIFIED CONGESTIVE HEART FAILURE TYPE: Primary | ICD-10-CM

## 2017-11-17 RX ORDER — FUROSEMIDE 40 MG/1
40 TABLET ORAL 2 TIMES DAILY
Qty: 60 TABLET | Refills: 6 | Status: SHIPPED | OUTPATIENT
Start: 2017-11-17 | End: 2017-11-24 | Stop reason: SDUPTHER

## 2017-11-17 NOTE — TELEPHONE ENCOUNTER
Spoke with pt regarding refill request as pt lives in [place] but requested refill at [facility].   Pt reports his local pharmacy told him he couldn't get his lasix refilled yet there.   Per notes, Dr Cheney increased lasix to 40mg bid but Rx was not changed.       Spoke with Flora at [facility] in [place].  An updated Rx for lasix would be accepted and filled.     rx updated in [facility] and sent to MD for signature. Pt informed.

## 2017-11-17 NOTE — TELEPHONE ENCOUNTER
----- Message from Alejandra Santos sent at 11/17/2017 10:36 AM CST -----  Contact: pt   Pt called because he is out of the Lasix due to the change in dosage.  He is taking 2 a day now due to the fluid buildup.  He is completely out of the medication and needs a new prescription that reflects him taking 2 a day. He is having a lot of swelling today.  He would like a 90 day supply sent to the [facility] - [place], [place] - [address].  He can be reached @ [phone].  Thanks!!

## 2017-11-24 ENCOUNTER — OFFICE VISIT (OUTPATIENT)
Dept: TRANSPLANT | Facility: CLINIC | Age: 27
End: 2017-11-24
Payer: MEDICAID

## 2017-11-24 ENCOUNTER — TELEPHONE (OUTPATIENT)
Dept: TRANSPLANT | Facility: CLINIC | Age: 27
End: 2017-11-24

## 2017-11-24 ENCOUNTER — LAB VISIT (OUTPATIENT)
Dept: LAB | Facility: HOSPITAL | Age: 27
End: 2017-11-24
Attending: INTERNAL MEDICINE
Payer: MEDICAID

## 2017-11-24 VITALS
WEIGHT: 260.13 LBS | DIASTOLIC BLOOD PRESSURE: 84 MMHG | SYSTOLIC BLOOD PRESSURE: 152 MMHG | HEART RATE: 89 BPM | HEIGHT: 63 IN | BODY MASS INDEX: 46.09 KG/M2

## 2017-11-24 DIAGNOSIS — I10 ESSENTIAL HYPERTENSION: ICD-10-CM

## 2017-11-24 DIAGNOSIS — I50.9 CONGESTIVE HEART FAILURE, UNSPECIFIED CONGESTIVE HEART FAILURE CHRONICITY, UNSPECIFIED CONGESTIVE HEART FAILURE TYPE: ICD-10-CM

## 2017-11-24 DIAGNOSIS — I50.22 CHRONIC SYSTOLIC CONGESTIVE HEART FAILURE, NYHA CLASS 3: ICD-10-CM

## 2017-11-24 DIAGNOSIS — I50.22 CHRONIC SYSTOLIC CONGESTIVE HEART FAILURE, NYHA CLASS 3: Primary | ICD-10-CM

## 2017-11-24 DIAGNOSIS — E66.01 MORBID OBESITY DUE TO EXCESS CALORIES: ICD-10-CM

## 2017-11-24 DIAGNOSIS — G47.33 OSA (OBSTRUCTIVE SLEEP APNEA): ICD-10-CM

## 2017-11-24 LAB
ALBUMIN SERPL BCP-MCNC: 3.6 G/DL
ALP SERPL-CCNC: 49 U/L
ALT SERPL W/O P-5'-P-CCNC: 40 U/L
ANION GAP SERPL CALC-SCNC: 8 MMOL/L
AST SERPL-CCNC: 25 U/L
BILIRUB SERPL-MCNC: 0.9 MG/DL
BNP SERPL-MCNC: 1378 PG/ML
BUN SERPL-MCNC: 11 MG/DL
CALCIUM SERPL-MCNC: 9.6 MG/DL
CHLORIDE SERPL-SCNC: 106 MMOL/L
CO2 SERPL-SCNC: 29 MMOL/L
CREAT SERPL-MCNC: 1.1 MG/DL
EST. GFR  (AFRICAN AMERICAN): >60 ML/MIN/1.73 M^2
EST. GFR  (NON AFRICAN AMERICAN): >60 ML/MIN/1.73 M^2
GLUCOSE SERPL-MCNC: 114 MG/DL
POTASSIUM SERPL-SCNC: 3.8 MMOL/L
PROT SERPL-MCNC: 7 G/DL
SODIUM SERPL-SCNC: 143 MMOL/L

## 2017-11-24 PROCEDURE — 80053 COMPREHEN METABOLIC PANEL: CPT | Mod: TXP

## 2017-11-24 PROCEDURE — 99214 OFFICE O/P EST MOD 30 MIN: CPT | Mod: S$PBB,NTX,, | Performed by: INTERNAL MEDICINE

## 2017-11-24 PROCEDURE — 99999 PR PBB SHADOW E&M-EST. PATIENT-LVL III: CPT | Mod: PBBFAC,TXP,, | Performed by: INTERNAL MEDICINE

## 2017-11-24 PROCEDURE — 83880 ASSAY OF NATRIURETIC PEPTIDE: CPT | Mod: TXP

## 2017-11-24 PROCEDURE — 99213 OFFICE O/P EST LOW 20 MIN: CPT | Mod: PBBFAC,TXP | Performed by: INTERNAL MEDICINE

## 2017-11-24 PROCEDURE — 36415 COLL VENOUS BLD VENIPUNCTURE: CPT | Mod: TXP

## 2017-11-24 RX ORDER — FUROSEMIDE 40 MG/1
80 TABLET ORAL 2 TIMES DAILY
Qty: 60 TABLET | Refills: 6 | Status: ON HOLD | OUTPATIENT
Start: 2017-11-24 | End: 2018-01-01 | Stop reason: HOSPADM

## 2017-11-24 NOTE — TELEPHONE ENCOUNTER
Per Dr Cheney, pt to have CMP in 1-2weeks locally.      Spoke with pt via phone.   He will go to [facility] for labs on 12/4/17.   Attempted to fax orders to [facility].   After mutiple attempts/transfers, was informed that [facility] will not accept orders from outside providers for pts to have labs done.    Left voicemail for pt to return call to discuss a different location for labs to be done.

## 2017-11-24 NOTE — PROGRESS NOTES
Subjective:    Patient ID:  Levon Arizmendi is a 27 y.o. male who presents for follow up of NiCMP with chronic systolic HF.     CC: Dyspnea on exertion    HPI:  Mr. Arizmendi is a 27 y.o. year old  male with NiCMP and resulting chronic systolic HF (HFrEF-EF 20%, LVEDD 7.1)) who is being followed for HF and possible advanced surgical options (LVAD/OHT). He was first diagnosed with CMP in ~2009 he thinks. He also has HTN, KAMILLE (on CPAP), HLD, Migraines, and h/o syncope in 2012.  He had a MPS 3/2017 concerning for ischemic defect but coronary angiography revealed normal coronaries 5/2017. He was last admitted 7/7-7/10 of this 2017 with a NSTEMI. Repeat [facility] again showed normal coronaries. He was last seen by me 11/3/17. He is currently medical management only given his obesity.    Today, he reports that his wt is down (lost 6lbs by our scales). He reports being admitted at [facility] 11/12-11/14 for ADHF. They stopped his Entresto and Bidil but restarted his bidil on d/c. They stopped Entresto for low BP but he reports he never felt tired, dizzy or LH. He reports stable 2-pillow orthopnea and says his fluid and coughing are improved. He no longer has epigastric discomfort since starting PPI. He denies PND, no syncope since 2012, no CP today; No other complaints. He is scheduled for ICD implant in the next week or two in [place]--still wearing life vest.     Echo 8/1/17:  - EF 20-25%  - LVEDD 7.1cm  - Mod-severe MR    [facility] 7/2017:  - Normal coronaries  - LVEDP 22mmhg    C 9/12/17:   RA: 23/14 (14)  RV: 70/20  PW: 40/47 (38)  PA: 68/35 (46)  PA_SAT: 60  AO: 130/83 (99)  PW_SAT: 95  AO_SAT: 97    CONDITION 1 (9/12/2017 13:50:09):  FICKCI: 1.8600  FICKCO: 4.1300  PVR: 155.0000  SVR: 1646.0000    CPX 9/12/17:  Metabolic Findings:  1)  Resting spirometry reveals an FVC = 2.8L which is 72% of predicted, an FEV1 of 2.2L, which is 67% of predicted and an FEV1/FVC ratio of 79%. The MVV = 89 L/min, which is 56%  of predicted.  2) The respiratory exchange ratio (RER) was 0.90, suggesting poor effort.  3) The breathing reserve is calculated at 7%, which is reduced. Oxygen saturation with exercise became reduced reaching a elicia of 88% from a baseline value of 96%.   4) The PkVO2 was 17.4 ml/kg/min which is 39% of predicted equating to a functional capacity of 5.0 METS indicating severe functional impairment.   5) The anaerobic threshold (AT), which occurred at a heart rate of 122bpm, was 13.8 ml/kg/min, which is 31% of the predicted VO2 and is reduced.   6) The PkVO2 Lean was 22.89 ml/kg of lean body weight/min indicating a good prognosis in heart failure.   7) The VE/VCO2 decreased by -5% from rest to AT. The VE/VCO2 [place] was 35.4.  The Resting PetCO2 was 34.9.      CPX Conclusions:  -Severe functional impairment associated with a reduced breathing reserve, reduced oxygen saturation with exercise, poor effort, and a reduced AT. These findings are indicative of functional impairment secondary to circulatory insufficiency and poor   effort.       Review of Systems   Constitution: Negative for chills, fever, malaise/fatigue, night sweats and weight loss.   HENT: Negative for congestion and sore throat.    Eyes: Negative for blurred vision and visual disturbance.   Cardiovascular: Positive for dyspnea on exertion (NYHA Class III) and leg swelling. Negative for chest pain, near-syncope, orthopnea, palpitations, paroxysmal nocturnal dyspnea and syncope.   Respiratory: Negative for cough, shortness of breath and wheezing.    Endocrine: Negative for cold intolerance and heat intolerance.   Hematologic/Lymphatic: Negative for adenopathy.   Skin: Negative for itching and rash.   Musculoskeletal: Negative for arthritis, back pain, joint pain and myalgias.   Gastrointestinal: Negative for bloating, abdominal pain, change in bowel habit, nausea and vomiting.   Genitourinary: Negative for dysuria and hesitancy.   Neurological: Negative  "for dizziness, headaches, numbness and sensory change.   Psychiatric/Behavioral: Negative for altered mental status and depression.       Objective:   Blood pressure (!) 152/84, pulse 89, height 5' 3" (1.6 m), weight 118 kg (260 lb 2.3 oz).body mass index is 46.08 kg/m².    Physical Exam   Constitutional: He is oriented to person, place, and time. He appears well-developed and well-nourished. No distress.   Obese   HENT:   Head: Normocephalic.   Mouth/Throat: Oropharynx is clear and moist.   Eyes: Pupils are equal, round, and reactive to light. No scleral icterus.   Neck: Normal range of motion. JVD (JVP ~12cm above RA) present. No thyromegaly present.   Cardiovascular: Normal rate, regular rhythm and normal heart sounds.  Exam reveals no gallop and no friction rub.    No murmur heard.  Frequent ectopy   Pulmonary/Chest: Effort normal. No respiratory distress. He has no wheezes. He has no rales.   Abdominal: Soft. He exhibits no distension. There is no tenderness. There is no rebound and no guarding.   Musculoskeletal: Normal range of motion. He exhibits no edema, tenderness or deformity.   Neurological: He is alert and oriented to person, place, and time. No cranial nerve deficit. He exhibits normal muscle tone. Coordination normal.   Skin: Skin is warm and dry. No rash noted. No erythema.   Psychiatric: He has a normal mood and affect.       Labs:      Chemistry        Component Value Date/Time     11/24/2017 0708    K 3.8 11/24/2017 0708     11/24/2017 0708    CO2 29 11/24/2017 0708    BUN 11 11/24/2017 0708    CREATININE 1.1 11/24/2017 0708     (H) 11/24/2017 0708        Component Value Date/Time    CALCIUM 9.6 11/24/2017 0708    ALKPHOS 49 (L) 11/24/2017 0708    AST 25 11/24/2017 0708    ALT 40 11/24/2017 0708    BILITOT 0.9 11/24/2017 0708    ESTGFRAFRICA >60.0 11/24/2017 0708    EGFRNONAA >60.0 11/24/2017 0708          No results found for: MG  Lab Results   Component Value Date    WBC 6.42 " 09/12/2017    HGB 14.2 09/12/2017    HCT 41.1 09/12/2017    MCV 80 (L) 09/12/2017     09/12/2017     BNP   Date Value Ref Range Status   11/24/2017 1,378 (H) 0 - 99 pg/mL Final     Comment:     Values of less than 100 pg/ml are consistent with non-CHF populations.   11/03/2017 1,548 (H) 0 - 99 pg/mL Final     Comment:     Values of less than 100 pg/ml are consistent with non-CHF populations.   10/17/2017 478 (H) 0 - 99 pg/mL Final     Comment:     Values of less than 100 pg/ml are consistent with non-CHF populations.     No results found for this or any previous visit.    Labs were reviewed with the patient.    Assessment:      1. Chronic systolic congestive heart failure, NYHA class 3    2. Essential hypertension    3. Morbid obesity due to excess calories    4. KAMILLE (obstructive sleep apnea)    5. Congestive heart failure, unspecified congestive heart failure chronicity, unspecified congestive heart failure type        Plan:   Chronic combined HF, ACC Stage D, NYHA Class IIIb  - Pt appears volume-up on exam today but compensated.  - Scr stable today at Scr 1.1  - HR worse today but he is on Coreg 12.5mg bid instead of 25mg BID--decreased at [facility]; BP 150s/80s today on just hydralazine 25mg TID and ISDN 20mg TID  - Will restart Entresto 97/103 BID, told to call if tried to stop again; cont Hydralazine/ISDN at above dose  - Will increase lasix to 80mg BID from here on out--take extra 40mg this evening (120mg total); will get CMP next week   - Cont Aldactone 25mg daily  - RTC 6 weeks; will consider up-titration of Coreg at that time  - discussed at length his need to lose weight as he is currently not a candidate for OHT and marginal for LVAD given his obesity; we discussed diet and graded exercise to help improve this; he voiced understanding and will try to work on it    Return in about 6 weeks (around 1/5/2018).    Patient is now NYHA III ACC stage D  Recommend 2 gram sodium restriction and 1500cc fluid  restriction.  Encourage physical activity with graded exercise program.  Requested patient to weigh themselves daily, and to notify us if their weight increases by more than 3 lbs in 1 day or 5 lbs in 1 week.     Transplant Candidacy: Not currently due to obesity and too medically stable by risk stratification testing.    UNOS Patient Status  Functional Status: 80% - Normal activity with effort: some symptoms of disease  Physical Capacity: No Limitations  Working for Income: Unknown    Jose J Cheney MD

## 2017-11-24 NOTE — LETTER
November 24, 2017        Shane Alonso  [address] [facility] [address]  Phone: [phone]  Fax: [phone]             [facility] [address]  [place] [address]  Phone: [phone]   Patient: Levon Arizmendi   MR Number: [id number]   YOB: 1990   Date of Visit: 11/24/2017       Dear Dr. Shane Alonso    Thank you for referring Levon Arizmendi to me for evaluation. Attached you will find relevant portions of my assessment and plan of care.    If you have questions, please do not hesitate to call me. I look forward to following Levon Arizmendi along with you.    Sincerely,    Jose J Cheney MD    Enclosure    If you would like to receive this communication electronically, please contact [email or web address] or [phone] to request [employer] access.    [employer] is a tool which provides read-only access to select patient information with whom you have a relationship. Its easy to use and provides real time access to review your patients record including encounter summaries, notes, results, and demographic information.    If you feel you have received this communication in error or would no longer like to receive these types of communications, please e-mail [email or web address]

## 2017-11-24 NOTE — PATIENT INSTRUCTIONS
Restart Entresto 97/103 twice daily--call if told to stop again  Increase Lasix to 80mg twice daily  Labs in 2 weeks

## 2017-12-05 DIAGNOSIS — E87.6 HYPOKALEMIA: Primary | ICD-10-CM

## 2017-12-05 NOTE — TELEPHONE ENCOUNTER
Received faxed labs from [facility].  K = 3.2.   Per Dr Cheney, pt to start KCL 40meq daily with repeat BMP in 1 week.    rx entered and sent to MD for signature.    Instructed pt on KCL.   Reviewed med list with pt.  He reports he has not been taking Entresto b/c pharmacy will not fill it.  Spoke with [facility] in [place] who report Entresto needs prior auth.   Requested that PA form be faxed so PA can be initiated.

## 2017-12-06 RX ORDER — POTASSIUM CHLORIDE 20 MEQ/1
40 TABLET, EXTENDED RELEASE ORAL DAILY
Qty: 60 TABLET | Refills: 6 | Status: ON HOLD | OUTPATIENT
Start: 2017-12-06 | End: 2018-01-01 | Stop reason: HOSPADM

## 2017-12-07 ENCOUNTER — TELEPHONE (OUTPATIENT)
Dept: TRANSPLANT | Facility: CLINIC | Age: 27
End: 2017-12-07

## 2017-12-07 NOTE — TELEPHONE ENCOUNTER
Per pt he has not picked up his Rx for KCL yet as he is currently in the hospital for placement of a defribillator.  He reports he asked if his potassium level was low again and he was told it was not.   He will  his Rx once discharged (hopefully celia)    Also informed pt that an appeal to the medicaid denial for Entresto has been initiated. Dr Cheney will call the medicaid medical director to discuss.  Will inform pt once entresto has been approved. Pt voiced understanding.

## 2018-01-01 ENCOUNTER — TELEPHONE (OUTPATIENT)
Dept: TRANSPLANT | Facility: CLINIC | Age: 28
End: 2018-01-01

## 2018-01-01 ENCOUNTER — PATIENT MESSAGE (OUTPATIENT)
Dept: TRANSPLANT | Facility: CLINIC | Age: 28
End: 2018-01-01

## 2018-01-01 ENCOUNTER — OFFICE VISIT (OUTPATIENT)
Dept: TRANSPLANT | Facility: CLINIC | Age: 28
End: 2018-01-01
Payer: MEDICAID

## 2018-01-01 ENCOUNTER — SOCIAL WORK (OUTPATIENT)
Dept: TRANSPLANT | Facility: CLINIC | Age: 28
End: 2018-01-01
Payer: MEDICAID

## 2018-01-01 ENCOUNTER — HOSPITAL ENCOUNTER (INPATIENT)
Facility: HOSPITAL | Age: 28
LOS: 13 days | Discharge: HOME | DRG: 286 | End: 2018-04-12
Attending: INTERNAL MEDICINE | Admitting: INTERNAL MEDICINE
Payer: MEDICAID

## 2018-01-01 ENCOUNTER — LAB VISIT (OUTPATIENT)
Dept: LAB | Facility: HOSPITAL | Age: 28
End: 2018-01-01
Attending: INTERNAL MEDICINE
Payer: MEDICAID

## 2018-01-01 ENCOUNTER — SOCIAL WORK (OUTPATIENT)
Dept: TRANSPLANT | Facility: CLINIC | Age: 28
End: 2018-01-01

## 2018-01-01 VITALS
OXYGEN SATURATION: 96 % | DIASTOLIC BLOOD PRESSURE: 59 MMHG | WEIGHT: 188.69 LBS | BODY MASS INDEX: 33.43 KG/M2 | TEMPERATURE: 98 F | RESPIRATION RATE: 18 BRPM | HEART RATE: 113 BPM | SYSTOLIC BLOOD PRESSURE: 100 MMHG | HEIGHT: 63 IN

## 2018-01-01 VITALS
SYSTOLIC BLOOD PRESSURE: 136 MMHG | BODY MASS INDEX: 36.8 KG/M2 | WEIGHT: 207.69 LBS | HEIGHT: 63 IN | HEART RATE: 108 BPM | DIASTOLIC BLOOD PRESSURE: 89 MMHG

## 2018-01-01 VITALS
WEIGHT: 210.56 LBS | HEIGHT: 63 IN | HEART RATE: 103 BPM | SYSTOLIC BLOOD PRESSURE: 103 MMHG | BODY MASS INDEX: 37.31 KG/M2 | DIASTOLIC BLOOD PRESSURE: 71 MMHG

## 2018-01-01 DIAGNOSIS — T50.2X5A DIURETIC-INDUCED HYPOKALEMIA: ICD-10-CM

## 2018-01-01 DIAGNOSIS — I50.22 CHRONIC SYSTOLIC CONGESTIVE HEART FAILURE, NYHA CLASS 3: ICD-10-CM

## 2018-01-01 DIAGNOSIS — I50.9 HEART FAILURE: ICD-10-CM

## 2018-01-01 DIAGNOSIS — I10 ESSENTIAL HYPERTENSION: ICD-10-CM

## 2018-01-01 DIAGNOSIS — Z76.82 ORGAN TRANSPLANT CANDIDATE: ICD-10-CM

## 2018-01-01 DIAGNOSIS — E87.6 DIURETIC-INDUCED HYPOKALEMIA: ICD-10-CM

## 2018-01-01 DIAGNOSIS — E66.01 MORBID OBESITY DUE TO EXCESS CALORIES: Primary | ICD-10-CM

## 2018-01-01 DIAGNOSIS — I42.0 COCM (CONGESTIVE CARDIOMYOPATHY): ICD-10-CM

## 2018-01-01 DIAGNOSIS — I50.22 CHRONIC SYSTOLIC CONGESTIVE HEART FAILURE, NYHA CLASS 3: Primary | ICD-10-CM

## 2018-01-01 DIAGNOSIS — E66.01 MORBID OBESITY DUE TO EXCESS CALORIES: ICD-10-CM

## 2018-01-01 DIAGNOSIS — Z86.711 HISTORY OF PULMONARY EMBOLISM: ICD-10-CM

## 2018-01-01 DIAGNOSIS — E87.6 HYPOKALEMIA DUE TO LOSS OF POTASSIUM: Primary | ICD-10-CM

## 2018-01-01 DIAGNOSIS — E66.01 MORBID (SEVERE) OBESITY DUE TO EXCESS CALORIES: ICD-10-CM

## 2018-01-01 DIAGNOSIS — G47.33 OSA (OBSTRUCTIVE SLEEP APNEA): ICD-10-CM

## 2018-01-01 DIAGNOSIS — Z76.82 HEART TRANSPLANT CANDIDATE: ICD-10-CM

## 2018-01-01 DIAGNOSIS — Z76.82 ORGAN TRANSPLANT CANDIDATE: Primary | ICD-10-CM

## 2018-01-01 DIAGNOSIS — I50.9 ACUTE DECOMPENSATED HEART FAILURE: ICD-10-CM

## 2018-01-01 LAB
ALBUMIN SERPL BCP-MCNC: 1.8 G/DL
ALBUMIN SERPL BCP-MCNC: 1.8 G/DL
ALBUMIN SERPL BCP-MCNC: 1.9 G/DL
ALBUMIN SERPL BCP-MCNC: 2 G/DL
ALBUMIN SERPL BCP-MCNC: 2.2 G/DL
ALBUMIN SERPL BCP-MCNC: 2.3 G/DL
ALBUMIN SERPL BCP-MCNC: 2.9 G/DL
ALBUMIN SERPL BCP-MCNC: 3 G/DL
ALLENS TEST: ABNORMAL
ALP SERPL-CCNC: 105 U/L
ALP SERPL-CCNC: 65 U/L
ALP SERPL-CCNC: 67 U/L
ALP SERPL-CCNC: 67 U/L
ALP SERPL-CCNC: 71 U/L
ALP SERPL-CCNC: 76 U/L
ALP SERPL-CCNC: 77 U/L
ALP SERPL-CCNC: 82 U/L
ALP SERPL-CCNC: 85 U/L
ALP SERPL-CCNC: 86 U/L
ALP SERPL-CCNC: 92 U/L
ALP SERPL-CCNC: 92 U/L
ALP SERPL-CCNC: 93 U/L
ALP SERPL-CCNC: 93 U/L
ALP SERPL-CCNC: 95 U/L
ALP SERPL-CCNC: 95 U/L
ALP SERPL-CCNC: 99 U/L
ALT SERPL W/O P-5'-P-CCNC: 21 U/L
ALT SERPL W/O P-5'-P-CCNC: 25 U/L
ALT SERPL W/O P-5'-P-CCNC: 28 U/L
ALT SERPL W/O P-5'-P-CCNC: 29 U/L
ALT SERPL W/O P-5'-P-CCNC: 29 U/L
ALT SERPL W/O P-5'-P-CCNC: 30 U/L
ALT SERPL W/O P-5'-P-CCNC: 32 U/L
ALT SERPL W/O P-5'-P-CCNC: 35 U/L
ALT SERPL W/O P-5'-P-CCNC: 37 U/L
ALT SERPL W/O P-5'-P-CCNC: 37 U/L
ANION GAP SERPL CALC-SCNC: 10 MMOL/L
ANION GAP SERPL CALC-SCNC: 11 MMOL/L
ANION GAP SERPL CALC-SCNC: 12 MMOL/L
ANION GAP SERPL CALC-SCNC: 12 MMOL/L
ANION GAP SERPL CALC-SCNC: 13 MMOL/L
ANION GAP SERPL CALC-SCNC: 16 MMOL/L
ANION GAP SERPL CALC-SCNC: 6 MMOL/L
ANION GAP SERPL CALC-SCNC: 7 MMOL/L
ANION GAP SERPL CALC-SCNC: 8 MMOL/L
ANION GAP SERPL CALC-SCNC: 9 MMOL/L
ANISOCYTOSIS BLD QL SMEAR: ABNORMAL
ANISOCYTOSIS BLD QL SMEAR: SLIGHT
AST SERPL-CCNC: 29 U/L
AST SERPL-CCNC: 32 U/L
AST SERPL-CCNC: 32 U/L
AST SERPL-CCNC: 35 U/L
AST SERPL-CCNC: 36 U/L
AST SERPL-CCNC: 37 U/L
AST SERPL-CCNC: 37 U/L
AST SERPL-CCNC: 39 U/L
AST SERPL-CCNC: 40 U/L
AST SERPL-CCNC: 41 U/L
AST SERPL-CCNC: 42 U/L
AST SERPL-CCNC: 43 U/L
AST SERPL-CCNC: 45 U/L
AST SERPL-CCNC: 45 U/L
BACTERIA #/AREA URNS AUTO: ABNORMAL /HPF
BACTERIA BLD CULT: NORMAL
BACTERIA BLD CULT: NORMAL
BACTERIA UR CULT: NO GROWTH
BASOPHILS # BLD AUTO: 0.01 K/UL
BASOPHILS # BLD AUTO: 0.02 K/UL
BASOPHILS # BLD AUTO: 0.03 K/UL
BASOPHILS # BLD AUTO: 0.03 K/UL
BASOPHILS # BLD AUTO: 0.04 K/UL
BASOPHILS # BLD AUTO: 0.05 K/UL
BASOPHILS # BLD AUTO: 0.05 K/UL
BASOPHILS # BLD AUTO: 0.06 K/UL
BASOPHILS # BLD AUTO: 0.07 K/UL
BASOPHILS # BLD AUTO: ABNORMAL K/UL
BASOPHILS NFR BLD: 0 %
BASOPHILS NFR BLD: 0.1 %
BASOPHILS NFR BLD: 0.2 %
BASOPHILS NFR BLD: 0.3 %
BASOPHILS NFR BLD: 0.5 %
BASOPHILS NFR BLD: 0.6 %
BASOPHILS NFR BLD: 0.6 %
BASOPHILS NFR BLD: 0.7 %
BASOPHILS NFR BLD: 1 %
BILIRUB DIRECT SERPL-MCNC: 4 MG/DL
BILIRUB SERPL-MCNC: 3.8 MG/DL
BILIRUB SERPL-MCNC: 4.3 MG/DL
BILIRUB SERPL-MCNC: 4.4 MG/DL
BILIRUB SERPL-MCNC: 4.9 MG/DL
BILIRUB SERPL-MCNC: 4.9 MG/DL
BILIRUB SERPL-MCNC: 5.2 MG/DL
BILIRUB SERPL-MCNC: 5.2 MG/DL
BILIRUB SERPL-MCNC: 5.3 MG/DL
BILIRUB SERPL-MCNC: 5.4 MG/DL
BILIRUB SERPL-MCNC: 5.7 MG/DL
BILIRUB SERPL-MCNC: 6.1 MG/DL
BILIRUB SERPL-MCNC: 6.3 MG/DL
BILIRUB SERPL-MCNC: 6.5 MG/DL
BILIRUB SERPL-MCNC: 6.6 MG/DL
BILIRUB SERPL-MCNC: 6.7 MG/DL
BILIRUB SERPL-MCNC: 6.8 MG/DL
BILIRUB SERPL-MCNC: 7 MG/DL
BILIRUB UR QL STRIP: NEGATIVE
BILIRUB UR QL STRIP: NEGATIVE
BNP SERPL-MCNC: 2704 PG/ML
BNP SERPL-MCNC: 2921 PG/ML
BNP SERPL-MCNC: 3487 PG/ML
BNP SERPL-MCNC: 998 PG/ML
BUN SERPL-MCNC: 15 MG/DL
BUN SERPL-MCNC: 19 MG/DL
BUN SERPL-MCNC: 20 MG/DL
BUN SERPL-MCNC: 21 MG/DL
BUN SERPL-MCNC: 23 MG/DL
BUN SERPL-MCNC: 24 MG/DL
BUN SERPL-MCNC: 24 MG/DL
BUN SERPL-MCNC: 25 MG/DL
BUN SERPL-MCNC: 27 MG/DL
BUN SERPL-MCNC: 27 MG/DL
BUN SERPL-MCNC: 28 MG/DL
BUN SERPL-MCNC: 28 MG/DL
BUN SERPL-MCNC: 30 MG/DL
BUN SERPL-MCNC: 34 MG/DL
BUN SERPL-MCNC: 36 MG/DL
BUN SERPL-MCNC: 37 MG/DL
BUN SERPL-MCNC: 44 MG/DL
BUN SERPL-MCNC: 44 MG/DL
BUN SERPL-MCNC: 47 MG/DL
BUN SERPL-MCNC: 49 MG/DL
BUN SERPL-MCNC: 50 MG/DL
BUN SERPL-MCNC: 55 MG/DL
BUN SERPL-MCNC: 56 MG/DL
BUN SERPL-MCNC: 56 MG/DL
CALCIUM SERPL-MCNC: 8.3 MG/DL
CALCIUM SERPL-MCNC: 8.5 MG/DL
CALCIUM SERPL-MCNC: 8.6 MG/DL
CALCIUM SERPL-MCNC: 8.7 MG/DL
CALCIUM SERPL-MCNC: 8.8 MG/DL
CALCIUM SERPL-MCNC: 9 MG/DL
CALCIUM SERPL-MCNC: 9.1 MG/DL
CALCIUM SERPL-MCNC: 9.2 MG/DL
CALCIUM SERPL-MCNC: 9.2 MG/DL
CALCIUM SERPL-MCNC: 9.3 MG/DL
CALCIUM SERPL-MCNC: 9.3 MG/DL
CALCIUM SERPL-MCNC: 9.4 MG/DL
CALCIUM SERPL-MCNC: 9.6 MG/DL
CALCIUM SERPL-MCNC: 9.7 MG/DL
CALCIUM SERPL-MCNC: 9.8 MG/DL
CALCIUM SERPL-MCNC: 9.9 MG/DL
CHLORIDE SERPL-SCNC: 87 MMOL/L
CHLORIDE SERPL-SCNC: 88 MMOL/L
CHLORIDE SERPL-SCNC: 89 MMOL/L
CHLORIDE SERPL-SCNC: 90 MMOL/L
CHLORIDE SERPL-SCNC: 90 MMOL/L
CHLORIDE SERPL-SCNC: 91 MMOL/L
CHLORIDE SERPL-SCNC: 92 MMOL/L
CHLORIDE SERPL-SCNC: 93 MMOL/L
CHLORIDE SERPL-SCNC: 94 MMOL/L
CHLORIDE SERPL-SCNC: 95 MMOL/L
CHLORIDE SERPL-SCNC: 98 MMOL/L
CHLORIDE SERPL-SCNC: 98 MMOL/L
CLARITY UR REFRACT.AUTO: CLEAR
CLARITY UR REFRACT.AUTO: CLEAR
CO2 SERPL-SCNC: 25 MMOL/L
CO2 SERPL-SCNC: 26 MMOL/L
CO2 SERPL-SCNC: 27 MMOL/L
CO2 SERPL-SCNC: 27 MMOL/L
CO2 SERPL-SCNC: 28 MMOL/L
CO2 SERPL-SCNC: 30 MMOL/L
CO2 SERPL-SCNC: 31 MMOL/L
CO2 SERPL-SCNC: 32 MMOL/L
CO2 SERPL-SCNC: 33 MMOL/L
CO2 SERPL-SCNC: 34 MMOL/L
CO2 SERPL-SCNC: 34 MMOL/L
CO2 SERPL-SCNC: 35 MMOL/L
CO2 SERPL-SCNC: 35 MMOL/L
CO2 SERPL-SCNC: 36 MMOL/L
CO2 SERPL-SCNC: 37 MMOL/L
CO2 SERPL-SCNC: 37 MMOL/L
COLOR UR AUTO: ABNORMAL
COLOR UR AUTO: YELLOW
CREAT SERPL-MCNC: 0.8 MG/DL
CREAT SERPL-MCNC: 0.9 MG/DL
CREAT SERPL-MCNC: 1 MG/DL
CREAT SERPL-MCNC: 1.1 MG/DL
CREAT SERPL-MCNC: 1.1 MG/DL
CREAT SERPL-MCNC: 1.2 MG/DL
CREAT SERPL-MCNC: 1.3 MG/DL
CREAT SERPL-MCNC: 1.3 MG/DL
CREAT SERPL-MCNC: 1.4 MG/DL
CREAT SERPL-MCNC: 1.4 MG/DL
CREAT SERPL-MCNC: 1.5 MG/DL
CREAT SERPL-MCNC: 1.6 MG/DL
CREAT SERPL-MCNC: 1.6 MG/DL
CREAT SERPL-MCNC: 1.8 MG/DL
CREAT SERPL-MCNC: 1.9 MG/DL
CREAT SERPL-MCNC: 2.1 MG/DL
CRP SERPL-MCNC: 216.48 MG/L
DELSYS: ABNORMAL
DIFFERENTIAL METHOD: ABNORMAL
EOSINOPHIL # BLD AUTO: 0 K/UL
EOSINOPHIL # BLD AUTO: 0 K/UL
EOSINOPHIL # BLD AUTO: 0.1 K/UL
EOSINOPHIL # BLD AUTO: 0.2 K/UL
EOSINOPHIL # BLD AUTO: ABNORMAL K/UL
EOSINOPHIL NFR BLD: 0 %
EOSINOPHIL NFR BLD: 0.3 %
EOSINOPHIL NFR BLD: 0.7 %
EOSINOPHIL NFR BLD: 1 %
EOSINOPHIL NFR BLD: 1.2 %
EOSINOPHIL NFR BLD: 1.3 %
EOSINOPHIL NFR BLD: 1.4 %
EOSINOPHIL NFR BLD: 1.4 %
EOSINOPHIL NFR BLD: 1.5 %
EOSINOPHIL NFR BLD: 1.6 %
EOSINOPHIL NFR BLD: 1.8 %
EOSINOPHIL NFR BLD: 1.9 %
EOSINOPHIL NFR BLD: 1.9 %
EOSINOPHIL NFR BLD: 2 %
ERYTHROCYTE [DISTWIDTH] IN BLOOD BY AUTOMATED COUNT: 21.1 %
ERYTHROCYTE [DISTWIDTH] IN BLOOD BY AUTOMATED COUNT: 21.3 %
ERYTHROCYTE [DISTWIDTH] IN BLOOD BY AUTOMATED COUNT: 21.3 %
ERYTHROCYTE [DISTWIDTH] IN BLOOD BY AUTOMATED COUNT: 21.4 %
ERYTHROCYTE [DISTWIDTH] IN BLOOD BY AUTOMATED COUNT: 21.4 %
ERYTHROCYTE [DISTWIDTH] IN BLOOD BY AUTOMATED COUNT: 21.5 %
ERYTHROCYTE [DISTWIDTH] IN BLOOD BY AUTOMATED COUNT: 21.7 %
ERYTHROCYTE [DISTWIDTH] IN BLOOD BY AUTOMATED COUNT: 21.8 %
ERYTHROCYTE [DISTWIDTH] IN BLOOD BY AUTOMATED COUNT: 21.9 %
ERYTHROCYTE [DISTWIDTH] IN BLOOD BY AUTOMATED COUNT: 22.2 %
ERYTHROCYTE [DISTWIDTH] IN BLOOD BY AUTOMATED COUNT: 22.2 %
ERYTHROCYTE [DISTWIDTH] IN BLOOD BY AUTOMATED COUNT: 22.4 %
ERYTHROCYTE [DISTWIDTH] IN BLOOD BY AUTOMATED COUNT: 22.5 %
ERYTHROCYTE [DISTWIDTH] IN BLOOD BY AUTOMATED COUNT: 23.4 %
ERYTHROCYTE [DISTWIDTH] IN BLOOD BY AUTOMATED COUNT: 24 %
ERYTHROCYTE [SEDIMENTATION RATE] IN BLOOD BY WESTERGREN METHOD: 16 MM/H
ERYTHROCYTE [SEDIMENTATION RATE] IN BLOOD BY WESTERGREN METHOD: 18 MM/H
ERYTHROCYTE [SEDIMENTATION RATE] IN BLOOD BY WESTERGREN METHOD: 66 MM/HR
EST. GFR  (AFRICAN AMERICAN): 48.1 ML/MIN/1.73 M^2
EST. GFR  (AFRICAN AMERICAN): 54.3 ML/MIN/1.73 M^2
EST. GFR  (AFRICAN AMERICAN): 57.9 ML/MIN/1.73 M^2
EST. GFR  (AFRICAN AMERICAN): >60 ML/MIN/1.73 M^2
EST. GFR  (NON AFRICAN AMERICAN): 41.6 ML/MIN/1.73 M^2
EST. GFR  (NON AFRICAN AMERICAN): 46.9 ML/MIN/1.73 M^2
EST. GFR  (NON AFRICAN AMERICAN): 50.1 ML/MIN/1.73 M^2
EST. GFR  (NON AFRICAN AMERICAN): 57.8 ML/MIN/1.73 M^2
EST. GFR  (NON AFRICAN AMERICAN): 57.8 ML/MIN/1.73 M^2
EST. GFR  (NON AFRICAN AMERICAN): >60 ML/MIN/1.73 M^2
ESTIMATED PA SYSTOLIC PRESSURE: 49.11
FIO2: 21
FIO2: 21
GLUCOSE SERPL-MCNC: 101 MG/DL
GLUCOSE SERPL-MCNC: 101 MG/DL
GLUCOSE SERPL-MCNC: 106 MG/DL
GLUCOSE SERPL-MCNC: 114 MG/DL
GLUCOSE SERPL-MCNC: 126 MG/DL
GLUCOSE SERPL-MCNC: 129 MG/DL
GLUCOSE SERPL-MCNC: 135 MG/DL
GLUCOSE SERPL-MCNC: 135 MG/DL
GLUCOSE SERPL-MCNC: 136 MG/DL
GLUCOSE SERPL-MCNC: 163 MG/DL
GLUCOSE SERPL-MCNC: 171 MG/DL
GLUCOSE SERPL-MCNC: 77 MG/DL
GLUCOSE SERPL-MCNC: 77 MG/DL
GLUCOSE SERPL-MCNC: 79 MG/DL
GLUCOSE SERPL-MCNC: 80 MG/DL
GLUCOSE SERPL-MCNC: 82 MG/DL
GLUCOSE SERPL-MCNC: 84 MG/DL
GLUCOSE SERPL-MCNC: 84 MG/DL
GLUCOSE SERPL-MCNC: 85 MG/DL
GLUCOSE SERPL-MCNC: 86 MG/DL
GLUCOSE SERPL-MCNC: 87 MG/DL
GLUCOSE SERPL-MCNC: 90 MG/DL
GLUCOSE SERPL-MCNC: 91 MG/DL
GLUCOSE SERPL-MCNC: 91 MG/DL
GLUCOSE SERPL-MCNC: 94 MG/DL
GLUCOSE SERPL-MCNC: 94 MG/DL
GLUCOSE SERPL-MCNC: 96 MG/DL
GLUCOSE SERPL-MCNC: 99 MG/DL
GLUCOSE UR QL STRIP: NEGATIVE
GLUCOSE UR QL STRIP: NEGATIVE
HAV IGM SERPL QL IA: NEGATIVE
HBV CORE IGM SERPL QL IA: NEGATIVE
HBV SURFACE AG SERPL QL IA: NEGATIVE
HCO3 UR-SCNC: 30.9 MMOL/L (ref 24–28)
HCO3 UR-SCNC: 32.4 MMOL/L (ref 24–28)
HCO3 UR-SCNC: 33 MMOL/L (ref 24–28)
HCO3 UR-SCNC: 34.6 MMOL/L (ref 24–28)
HCO3 UR-SCNC: 38 MMOL/L (ref 24–28)
HCO3 UR-SCNC: 39.5 MMOL/L (ref 24–28)
HCO3 UR-SCNC: 39.6 MMOL/L (ref 24–28)
HCO3 UR-SCNC: 39.9 MMOL/L (ref 24–28)
HCO3 UR-SCNC: 42 MMOL/L (ref 24–28)
HCT VFR BLD AUTO: 25.6 %
HCT VFR BLD AUTO: 26.2 %
HCT VFR BLD AUTO: 27.9 %
HCT VFR BLD AUTO: 28 %
HCT VFR BLD AUTO: 28.3 %
HCT VFR BLD AUTO: 28.7 %
HCT VFR BLD AUTO: 29.3 %
HCT VFR BLD AUTO: 29.6 %
HCT VFR BLD AUTO: 30.1 %
HCT VFR BLD AUTO: 30.4 %
HCT VFR BLD AUTO: 31.3 %
HCT VFR BLD AUTO: 31.8 %
HCT VFR BLD AUTO: 32.1 %
HCT VFR BLD AUTO: 32.3 %
HCT VFR BLD AUTO: 32.3 %
HCT VFR BLD AUTO: 36.7 %
HCT VFR BLD AUTO: 38.3 %
HCV AB SERPL QL IA: NEGATIVE
HGB BLD-MCNC: 10.2 G/DL
HGB BLD-MCNC: 10.2 G/DL
HGB BLD-MCNC: 10.3 G/DL
HGB BLD-MCNC: 10.4 G/DL
HGB BLD-MCNC: 10.6 G/DL
HGB BLD-MCNC: 10.8 G/DL
HGB BLD-MCNC: 10.9 G/DL
HGB BLD-MCNC: 10.9 G/DL
HGB BLD-MCNC: 11.1 G/DL
HGB BLD-MCNC: 11.2 G/DL
HGB BLD-MCNC: 11.2 G/DL
HGB BLD-MCNC: 11.3 G/DL
HGB BLD-MCNC: 11.4 G/DL
HGB BLD-MCNC: 12.9 G/DL
HGB BLD-MCNC: 13.3 G/DL
HGB BLD-MCNC: 8.4 G/DL
HGB BLD-MCNC: 8.6 G/DL
HGB UR QL STRIP: ABNORMAL
HGB UR QL STRIP: ABNORMAL
HYALINE CASTS UR QL AUTO: 7 /LPF
HYPOCHROMIA BLD QL SMEAR: ABNORMAL
IMM GRANULOCYTES # BLD AUTO: 0.03 K/UL
IMM GRANULOCYTES # BLD AUTO: 0.11 K/UL
IMM GRANULOCYTES # BLD AUTO: 0.15 K/UL
IMM GRANULOCYTES # BLD AUTO: 0.15 K/UL
IMM GRANULOCYTES # BLD AUTO: 0.17 K/UL
IMM GRANULOCYTES # BLD AUTO: 0.21 K/UL
IMM GRANULOCYTES # BLD AUTO: 0.21 K/UL
IMM GRANULOCYTES # BLD AUTO: 0.22 K/UL
IMM GRANULOCYTES # BLD AUTO: 0.23 K/UL
IMM GRANULOCYTES # BLD AUTO: 0.31 K/UL
IMM GRANULOCYTES # BLD AUTO: 0.38 K/UL
IMM GRANULOCYTES # BLD AUTO: 0.49 K/UL
IMM GRANULOCYTES # BLD AUTO: 0.72 K/UL
IMM GRANULOCYTES # BLD AUTO: ABNORMAL K/UL
IMM GRANULOCYTES NFR BLD AUTO: 0.5 %
IMM GRANULOCYTES NFR BLD AUTO: 1 %
IMM GRANULOCYTES NFR BLD AUTO: 1.2 %
IMM GRANULOCYTES NFR BLD AUTO: 1.4 %
IMM GRANULOCYTES NFR BLD AUTO: 1.4 %
IMM GRANULOCYTES NFR BLD AUTO: 1.5 %
IMM GRANULOCYTES NFR BLD AUTO: 1.9 %
IMM GRANULOCYTES NFR BLD AUTO: 1.9 %
IMM GRANULOCYTES NFR BLD AUTO: 2 %
IMM GRANULOCYTES NFR BLD AUTO: 2 %
IMM GRANULOCYTES NFR BLD AUTO: 2.8 %
IMM GRANULOCYTES NFR BLD AUTO: 3 %
IMM GRANULOCYTES NFR BLD AUTO: 4.6 %
IMM GRANULOCYTES NFR BLD AUTO: ABNORMAL %
INR PPP: 2
KETONES UR QL STRIP: NEGATIVE
KETONES UR QL STRIP: NEGATIVE
LEUKOCYTE ESTERASE UR QL STRIP: NEGATIVE
LEUKOCYTE ESTERASE UR QL STRIP: NEGATIVE
LYMPHOCYTES # BLD AUTO: 1 K/UL
LYMPHOCYTES # BLD AUTO: 1.2 K/UL
LYMPHOCYTES # BLD AUTO: 1.3 K/UL
LYMPHOCYTES # BLD AUTO: 1.4 K/UL
LYMPHOCYTES # BLD AUTO: 1.5 K/UL
LYMPHOCYTES # BLD AUTO: 1.5 K/UL
LYMPHOCYTES # BLD AUTO: 1.6 K/UL
LYMPHOCYTES # BLD AUTO: 1.6 K/UL
LYMPHOCYTES # BLD AUTO: 1.8 K/UL
LYMPHOCYTES # BLD AUTO: 1.8 K/UL
LYMPHOCYTES # BLD AUTO: 1.9 K/UL
LYMPHOCYTES # BLD AUTO: ABNORMAL K/UL
LYMPHOCYTES NFR BLD: 0 %
LYMPHOCYTES NFR BLD: 11.2 %
LYMPHOCYTES NFR BLD: 12 %
LYMPHOCYTES NFR BLD: 13 %
LYMPHOCYTES NFR BLD: 13.6 %
LYMPHOCYTES NFR BLD: 14.1 %
LYMPHOCYTES NFR BLD: 14.4 %
LYMPHOCYTES NFR BLD: 14.8 %
LYMPHOCYTES NFR BLD: 15.4 %
LYMPHOCYTES NFR BLD: 18 %
LYMPHOCYTES NFR BLD: 19.1 %
LYMPHOCYTES NFR BLD: 23.3 %
LYMPHOCYTES NFR BLD: 5.5 %
LYMPHOCYTES NFR BLD: 6.1 %
LYMPHOCYTES NFR BLD: 6.4 %
LYMPHOCYTES NFR BLD: 7.3 %
LYMPHOCYTES NFR BLD: 9 %
MAGNESIUM SERPL-MCNC: 1.5 MG/DL
MAGNESIUM SERPL-MCNC: 1.5 MG/DL
MAGNESIUM SERPL-MCNC: 1.6 MG/DL
MAGNESIUM SERPL-MCNC: 1.6 MG/DL
MAGNESIUM SERPL-MCNC: 1.7 MG/DL
MAGNESIUM SERPL-MCNC: 1.8 MG/DL
MAGNESIUM SERPL-MCNC: 1.9 MG/DL
MAGNESIUM SERPL-MCNC: 1.9 MG/DL
MAGNESIUM SERPL-MCNC: 2 MG/DL
MAGNESIUM SERPL-MCNC: 2.1 MG/DL
MAGNESIUM SERPL-MCNC: 2.2 MG/DL
MAGNESIUM SERPL-MCNC: 2.2 MG/DL
MAGNESIUM SERPL-MCNC: 2.3 MG/DL
MCH RBC QN AUTO: 24.1 PG
MCH RBC QN AUTO: 24.3 PG
MCH RBC QN AUTO: 24.3 PG
MCH RBC QN AUTO: 24.7 PG
MCH RBC QN AUTO: 24.8 PG
MCH RBC QN AUTO: 25 PG
MCH RBC QN AUTO: 25.1 PG
MCH RBC QN AUTO: 25.2 PG
MCH RBC QN AUTO: 25.4 PG
MCH RBC QN AUTO: 25.5 PG
MCH RBC QN AUTO: 25.8 PG
MCH RBC QN AUTO: 25.9 PG
MCH RBC QN AUTO: 26.2 PG
MCHC RBC AUTO-ENTMCNC: 32.8 G/DL
MCHC RBC AUTO-ENTMCNC: 32.8 G/DL
MCHC RBC AUTO-ENTMCNC: 33.7 G/DL
MCHC RBC AUTO-ENTMCNC: 34.7 G/DL
MCHC RBC AUTO-ENTMCNC: 34.9 G/DL
MCHC RBC AUTO-ENTMCNC: 35 G/DL
MCHC RBC AUTO-ENTMCNC: 35.1 G/DL
MCHC RBC AUTO-ENTMCNC: 35.2 G/DL
MCHC RBC AUTO-ENTMCNC: 36 G/DL
MCHC RBC AUTO-ENTMCNC: 36.2 G/DL
MCHC RBC AUTO-ENTMCNC: 36.4 G/DL
MCHC RBC AUTO-ENTMCNC: 36.4 G/DL
MCHC RBC AUTO-ENTMCNC: 36.5 G/DL
MCHC RBC AUTO-ENTMCNC: 36.5 G/DL
MCHC RBC AUTO-ENTMCNC: 36.9 G/DL
MCV RBC AUTO: 68 FL
MCV RBC AUTO: 68 FL
MCV RBC AUTO: 69 FL
MCV RBC AUTO: 69 FL
MCV RBC AUTO: 70 FL
MCV RBC AUTO: 71 FL
MCV RBC AUTO: 72 FL
MCV RBC AUTO: 73 FL
MCV RBC AUTO: 78 FL
MCV RBC AUTO: 79 FL
METAMYELOCYTES NFR BLD MANUAL: 1 %
METAMYELOCYTES NFR BLD MANUAL: 1 %
MICROSCOPIC COMMENT: ABNORMAL
MICROSCOPIC COMMENT: NORMAL
MITRAL VALVE REGURGITATION: ABNORMAL
MODE: ABNORMAL
MONOCYTES # BLD AUTO: 0.5 K/UL
MONOCYTES # BLD AUTO: 0.6 K/UL
MONOCYTES # BLD AUTO: 0.8 K/UL
MONOCYTES # BLD AUTO: 0.9 K/UL
MONOCYTES # BLD AUTO: 1 K/UL
MONOCYTES # BLD AUTO: 1.1 K/UL
MONOCYTES # BLD AUTO: 1.2 K/UL
MONOCYTES # BLD AUTO: 1.3 K/UL
MONOCYTES # BLD AUTO: 1.4 K/UL
MONOCYTES # BLD AUTO: 1.5 K/UL
MONOCYTES # BLD AUTO: 1.6 K/UL
MONOCYTES # BLD AUTO: ABNORMAL K/UL
MONOCYTES NFR BLD: 10 %
MONOCYTES NFR BLD: 11.4 %
MONOCYTES NFR BLD: 11.5 %
MONOCYTES NFR BLD: 12 %
MONOCYTES NFR BLD: 2 %
MONOCYTES NFR BLD: 4 %
MONOCYTES NFR BLD: 6.9 %
MONOCYTES NFR BLD: 7.6 %
MONOCYTES NFR BLD: 7.7 %
MONOCYTES NFR BLD: 7.9 %
MONOCYTES NFR BLD: 8.3 %
MONOCYTES NFR BLD: 8.4 %
MONOCYTES NFR BLD: 8.5 %
MONOCYTES NFR BLD: 8.5 %
MONOCYTES NFR BLD: 9 %
MONOCYTES NFR BLD: 9 %
MONOCYTES NFR BLD: 9.3 %
MYELOCYTES NFR BLD MANUAL: 2 %
NEUTROPHILS # BLD AUTO: 11.4 K/UL
NEUTROPHILS # BLD AUTO: 13 K/UL
NEUTROPHILS # BLD AUTO: 13.1 K/UL
NEUTROPHILS # BLD AUTO: 14.1 K/UL
NEUTROPHILS # BLD AUTO: 15.5 K/UL
NEUTROPHILS # BLD AUTO: 3.7 K/UL
NEUTROPHILS # BLD AUTO: 5.6 K/UL
NEUTROPHILS # BLD AUTO: 7.2 K/UL
NEUTROPHILS # BLD AUTO: 7.9 K/UL
NEUTROPHILS # BLD AUTO: 8 K/UL
NEUTROPHILS # BLD AUTO: 8 K/UL
NEUTROPHILS # BLD AUTO: 8.7 K/UL
NEUTROPHILS # BLD AUTO: 9.7 K/UL
NEUTROPHILS NFR BLD: 65.9 %
NEUTROPHILS NFR BLD: 69.9 %
NEUTROPHILS NFR BLD: 70 %
NEUTROPHILS NFR BLD: 70.8 %
NEUTROPHILS NFR BLD: 71.3 %
NEUTROPHILS NFR BLD: 71.5 %
NEUTROPHILS NFR BLD: 71.8 %
NEUTROPHILS NFR BLD: 73.1 %
NEUTROPHILS NFR BLD: 73.1 %
NEUTROPHILS NFR BLD: 73.2 %
NEUTROPHILS NFR BLD: 75 %
NEUTROPHILS NFR BLD: 79.8 %
NEUTROPHILS NFR BLD: 82.9 %
NEUTROPHILS NFR BLD: 84 %
NEUTROPHILS NFR BLD: 84.2 %
NEUTROPHILS NFR BLD: 86.3 %
NEUTROPHILS NFR BLD: 93 %
NEUTS BAND NFR BLD MANUAL: 1 %
NEUTS BAND NFR BLD MANUAL: 2 %
NEUTS BAND NFR BLD MANUAL: 3 %
NITRITE UR QL STRIP: NEGATIVE
NITRITE UR QL STRIP: NEGATIVE
NRBC BLD-RTO: 0 /100 WBC
NRBC BLD-RTO: 1 /100 WBC
NRBC BLD-RTO: 1 /100 WBC
OVALOCYTES BLD QL SMEAR: ABNORMAL
PCO2 BLDA: 43.5 MMHG (ref 35–45)
PCO2 BLDA: 46.1 MMHG (ref 35–45)
PCO2 BLDA: 46.2 MMHG (ref 35–45)
PCO2 BLDA: 46.9 MMHG (ref 35–45)
PCO2 BLDA: 47.6 MMHG (ref 35–45)
PCO2 BLDA: 48.8 MMHG (ref 35–45)
PCO2 BLDA: 49.9 MMHG (ref 35–45)
PCO2 BLDA: 52.5 MMHG (ref 35–45)
PCO2 BLDA: 53.8 MMHG (ref 35–45)
PH SMN: 7.46 [PH] (ref 7.35–7.45)
PH SMN: 7.47 [PH] (ref 7.35–7.45)
PH SMN: 7.48 [PH] (ref 7.35–7.45)
PH SMN: 7.5 [PH] (ref 7.35–7.45)
PH SMN: 7.51 [PH] (ref 7.35–7.45)
PH SMN: 7.51 [PH] (ref 7.35–7.45)
PH SMN: 7.53 [PH] (ref 7.35–7.45)
PH UR STRIP: 5 [PH] (ref 5–8)
PH UR STRIP: 7 [PH] (ref 5–8)
PHOSPHATE SERPL-MCNC: 3.1 MG/DL
PLATELET # BLD AUTO: 113 K/UL
PLATELET # BLD AUTO: 115 K/UL
PLATELET # BLD AUTO: 116 K/UL
PLATELET # BLD AUTO: 122 K/UL
PLATELET # BLD AUTO: 131 K/UL
PLATELET # BLD AUTO: 159 K/UL
PLATELET # BLD AUTO: 160 K/UL
PLATELET # BLD AUTO: 172 K/UL
PLATELET # BLD AUTO: 194 K/UL
PLATELET # BLD AUTO: 217 K/UL
PLATELET # BLD AUTO: 224 K/UL
PLATELET # BLD AUTO: 249 K/UL
PLATELET # BLD AUTO: 271 K/UL
PLATELET # BLD AUTO: 275 K/UL
PLATELET # BLD AUTO: 352 K/UL
PLATELET # BLD AUTO: 378 K/UL
PLATELET # BLD AUTO: 437 K/UL
PLATELET BLD QL SMEAR: ABNORMAL
PMV BLD AUTO: 10.2 FL
PMV BLD AUTO: 10.3 FL
PMV BLD AUTO: 10.4 FL
PMV BLD AUTO: 10.9 FL
PMV BLD AUTO: 11 FL
PMV BLD AUTO: 11.2 FL
PMV BLD AUTO: 11.5 FL
PMV BLD AUTO: 11.6 FL
PMV BLD AUTO: ABNORMAL FL
PO2 BLDA: 23 MMHG (ref 40–60)
PO2 BLDA: 24 MMHG (ref 40–60)
PO2 BLDA: 24 MMHG (ref 40–60)
PO2 BLDA: 25 MMHG (ref 40–60)
PO2 BLDA: 27 MMHG (ref 40–60)
PO2 BLDA: 29 MMHG (ref 40–60)
PO2 BLDA: 33 MMHG (ref 40–60)
POC BE: 11 MMOL/L
POC BE: 15 MMOL/L
POC BE: 16 MMOL/L
POC BE: 17 MMOL/L
POC BE: 17 MMOL/L
POC BE: 19 MMOL/L
POC BE: 7 MMOL/L
POC BE: 9 MMOL/L
POC BE: 9 MMOL/L
POC SATURATED O2: 45 % (ref 95–100)
POC SATURATED O2: 48 % (ref 95–100)
POC SATURATED O2: 48 % (ref 95–100)
POC SATURATED O2: 49 % (ref 95–100)
POC SATURATED O2: 54 % (ref 95–100)
POC SATURATED O2: 59 % (ref 95–100)
POC SATURATED O2: 61 % (ref 95–100)
POC SATURATED O2: 62 % (ref 95–100)
POC SATURATED O2: 66 % (ref 95–100)
POC TCO2: 32 MMOL/L (ref 24–29)
POC TCO2: 34 MMOL/L (ref 24–29)
POC TCO2: 34 MMOL/L (ref 24–29)
POC TCO2: 36 MMOL/L (ref 24–29)
POC TCO2: 39 MMOL/L (ref 24–29)
POC TCO2: 41 MMOL/L (ref 24–29)
POC TCO2: 44 MMOL/L (ref 24–29)
POIKILOCYTOSIS BLD QL SMEAR: ABNORMAL
POIKILOCYTOSIS BLD QL SMEAR: SLIGHT
POLYCHROMASIA BLD QL SMEAR: ABNORMAL
POTASSIUM SERPL-SCNC: 2.8 MMOL/L
POTASSIUM SERPL-SCNC: 3.3 MMOL/L
POTASSIUM SERPL-SCNC: 3.7 MMOL/L
POTASSIUM SERPL-SCNC: 3.8 MMOL/L
POTASSIUM SERPL-SCNC: 3.9 MMOL/L
POTASSIUM SERPL-SCNC: 4 MMOL/L
POTASSIUM SERPL-SCNC: 4 MMOL/L
POTASSIUM SERPL-SCNC: 4.1 MMOL/L
POTASSIUM SERPL-SCNC: 4.2 MMOL/L
POTASSIUM SERPL-SCNC: 4.2 MMOL/L
POTASSIUM SERPL-SCNC: 4.3 MMOL/L
POTASSIUM SERPL-SCNC: 4.5 MMOL/L
PROT SERPL-MCNC: 5.7 G/DL
PROT SERPL-MCNC: 5.9 G/DL
PROT SERPL-MCNC: 6.4 G/DL
PROT SERPL-MCNC: 6.5 G/DL
PROT SERPL-MCNC: 6.9 G/DL
PROT SERPL-MCNC: 7.1 G/DL
PROT SERPL-MCNC: 7.2 G/DL
PROT SERPL-MCNC: 7.3 G/DL
PROT SERPL-MCNC: 7.4 G/DL
PROT SERPL-MCNC: 7.4 G/DL
PROT SERPL-MCNC: 7.5 G/DL
PROT SERPL-MCNC: 7.6 G/DL
PROT SERPL-MCNC: 7.8 G/DL
PROT UR QL STRIP: NEGATIVE
PROT UR QL STRIP: NEGATIVE
PROTHROMBIN TIME: 19.8 SEC
RBC # BLD AUTO: 3.29 M/UL
RBC # BLD AUTO: 3.33 M/UL
RBC # BLD AUTO: 4.01 M/UL
RBC # BLD AUTO: 4.02 M/UL
RBC # BLD AUTO: 4.04 M/UL
RBC # BLD AUTO: 4.05 M/UL
RBC # BLD AUTO: 4.06 M/UL
RBC # BLD AUTO: 4.37 M/UL
RBC # BLD AUTO: 4.4 M/UL
RBC # BLD AUTO: 4.43 M/UL
RBC # BLD AUTO: 4.48 M/UL
RBC # BLD AUTO: 4.49 M/UL
RBC # BLD AUTO: 4.53 M/UL
RBC # BLD AUTO: 4.56 M/UL
RBC # BLD AUTO: 4.61 M/UL
RBC # BLD AUTO: 5.2 M/UL
RBC # BLD AUTO: 5.47 M/UL
RBC #/AREA URNS AUTO: 2 /HPF (ref 0–4)
RBC #/AREA URNS AUTO: 80 /HPF (ref 0–4)
RETIRED EF AND QEF - SEE NOTES: 25 (ref 55–65)
SAMPLE: ABNORMAL
SCHISTOCYTES BLD QL SMEAR: PRESENT
SCHISTOCYTES BLD QL SMEAR: PRESENT
SITE: ABNORMAL
SODIUM SERPL-SCNC: 128 MMOL/L
SODIUM SERPL-SCNC: 129 MMOL/L
SODIUM SERPL-SCNC: 130 MMOL/L
SODIUM SERPL-SCNC: 130 MMOL/L
SODIUM SERPL-SCNC: 131 MMOL/L
SODIUM SERPL-SCNC: 131 MMOL/L
SODIUM SERPL-SCNC: 132 MMOL/L
SODIUM SERPL-SCNC: 133 MMOL/L
SODIUM SERPL-SCNC: 134 MMOL/L
SODIUM SERPL-SCNC: 135 MMOL/L
SODIUM SERPL-SCNC: 136 MMOL/L
SODIUM SERPL-SCNC: 137 MMOL/L
SP GR UR STRIP: 1.01 (ref 1–1.03)
SP GR UR STRIP: 1.01 (ref 1–1.03)
SP02: 100
SP02: 99
SPHEROCYTES BLD QL SMEAR: ABNORMAL
SQUAMOUS #/AREA URNS AUTO: 0 /HPF
TARGETS BLD QL SMEAR: ABNORMAL
TRICUSPID VALVE REGURGITATION: ABNORMAL
URATE SERPL-MCNC: 9.8 MG/DL
URN SPEC COLLECT METH UR: ABNORMAL
URN SPEC COLLECT METH UR: ABNORMAL
UROBILINOGEN UR STRIP-ACNC: 2 EU/DL
UROBILINOGEN UR STRIP-ACNC: NEGATIVE EU/DL
VANCOMYCIN SERPL-MCNC: 26.6 UG/ML
WBC # BLD AUTO: 10.07 K/UL
WBC # BLD AUTO: 10.89 K/UL
WBC # BLD AUTO: 10.99 K/UL
WBC # BLD AUTO: 11.01 K/UL
WBC # BLD AUTO: 12.18 K/UL
WBC # BLD AUTO: 13.65 K/UL
WBC # BLD AUTO: 15.58 K/UL
WBC # BLD AUTO: 15.6 K/UL
WBC # BLD AUTO: 15.74 K/UL
WBC # BLD AUTO: 16.19 K/UL
WBC # BLD AUTO: 16.35 K/UL
WBC # BLD AUTO: 16.68 K/UL
WBC # BLD AUTO: 16.95 K/UL
WBC # BLD AUTO: 17.92 K/UL
WBC # BLD AUTO: 18.4 K/UL
WBC # BLD AUTO: 5.58 K/UL
WBC # BLD AUTO: 8.07 K/UL
WBC #/AREA URNS AUTO: 2 /HPF (ref 0–5)
WBC #/AREA URNS AUTO: 22 /HPF (ref 0–5)

## 2018-01-01 PROCEDURE — 36415 COLL VENOUS BLD VENIPUNCTURE: CPT

## 2018-01-01 PROCEDURE — 99233 SBSQ HOSP IP/OBS HIGH 50: CPT | Mod: ,,, | Performed by: INTERNAL MEDICINE

## 2018-01-01 PROCEDURE — 25000003 PHARM REV CODE 250: Performed by: INTERNAL MEDICINE

## 2018-01-01 PROCEDURE — 99900035 HC TECH TIME PER 15 MIN (STAT): Mod: NTX

## 2018-01-01 PROCEDURE — 99291 CRITICAL CARE FIRST HOUR: CPT | Mod: NTX,,, | Performed by: INTERNAL MEDICINE

## 2018-01-01 PROCEDURE — 83735 ASSAY OF MAGNESIUM: CPT | Mod: 91

## 2018-01-01 PROCEDURE — 83735 ASSAY OF MAGNESIUM: CPT

## 2018-01-01 PROCEDURE — 63600175 PHARM REV CODE 636 W HCPCS: Performed by: PHYSICIAN ASSISTANT

## 2018-01-01 PROCEDURE — C1751 CATH, INF, PER/CENT/MIDLINE: HCPCS | Mod: NTX

## 2018-01-01 PROCEDURE — 63600175 PHARM REV CODE 636 W HCPCS: Mod: NTX | Performed by: INTERNAL MEDICINE

## 2018-01-01 PROCEDURE — 81001 URINALYSIS AUTO W/SCOPE: CPT

## 2018-01-01 PROCEDURE — 87040 BLOOD CULTURE FOR BACTERIA: CPT

## 2018-01-01 PROCEDURE — 86141 C-REACTIVE PROTEIN HS: CPT

## 2018-01-01 PROCEDURE — 63600175 PHARM REV CODE 636 W HCPCS: Performed by: INTERNAL MEDICINE

## 2018-01-01 PROCEDURE — 82803 BLOOD GASES ANY COMBINATION: CPT | Mod: NTX

## 2018-01-01 PROCEDURE — 80048 BASIC METABOLIC PNL TOTAL CA: CPT | Mod: NTX

## 2018-01-01 PROCEDURE — 25500020 PHARM REV CODE 255: Performed by: INTERNAL MEDICINE

## 2018-01-01 PROCEDURE — 80053 COMPREHEN METABOLIC PANEL: CPT

## 2018-01-01 PROCEDURE — 94660 CPAP INITIATION&MGMT: CPT | Mod: NTX

## 2018-01-01 PROCEDURE — A4216 STERILE WATER/SALINE, 10 ML: HCPCS | Performed by: INTERNAL MEDICINE

## 2018-01-01 PROCEDURE — 25000003 PHARM REV CODE 250: Mod: NTX | Performed by: STUDENT IN AN ORGANIZED HEALTH CARE EDUCATION/TRAINING PROGRAM

## 2018-01-01 PROCEDURE — 93306 TTE W/DOPPLER COMPLETE: CPT | Mod: 26,NTX,, | Performed by: INTERNAL MEDICINE

## 2018-01-01 PROCEDURE — 4A023N6 MEASUREMENT OF CARDIAC SAMPLING AND PRESSURE, RIGHT HEART, PERCUTANEOUS APPROACH: ICD-10-PCS | Performed by: INTERNAL MEDICINE

## 2018-01-01 PROCEDURE — 63600175 PHARM REV CODE 636 W HCPCS: Mod: NTX | Performed by: STUDENT IN AN ORGANIZED HEALTH CARE EDUCATION/TRAINING PROGRAM

## 2018-01-01 PROCEDURE — 25000003 PHARM REV CODE 250: Performed by: PHYSICIAN ASSISTANT

## 2018-01-01 PROCEDURE — 20000000 HC ICU ROOM: Mod: NTX

## 2018-01-01 PROCEDURE — 97163 PT EVAL HIGH COMPLEX 45 MIN: CPT | Mod: NTX

## 2018-01-01 PROCEDURE — 25000003 PHARM REV CODE 250: Mod: NTX | Performed by: INTERNAL MEDICINE

## 2018-01-01 PROCEDURE — 85025 COMPLETE CBC W/AUTO DIFF WBC: CPT

## 2018-01-01 PROCEDURE — 25000003 PHARM REV CODE 250

## 2018-01-01 PROCEDURE — 83880 ASSAY OF NATRIURETIC PEPTIDE: CPT | Mod: NTX

## 2018-01-01 PROCEDURE — 99999 PR PBB SHADOW E&M-EST. PATIENT-LVL III: CPT | Mod: PBBFAC,,, | Performed by: INTERNAL MEDICINE

## 2018-01-01 PROCEDURE — 83880 ASSAY OF NATRIURETIC PEPTIDE: CPT

## 2018-01-01 PROCEDURE — 84550 ASSAY OF BLOOD/URIC ACID: CPT

## 2018-01-01 PROCEDURE — 87086 URINE CULTURE/COLONY COUNT: CPT | Mod: NTX

## 2018-01-01 PROCEDURE — 99233 SBSQ HOSP IP/OBS HIGH 50: CPT | Mod: NTX,,, | Performed by: INTERNAL MEDICINE

## 2018-01-01 PROCEDURE — 25000003 PHARM REV CODE 250: Performed by: STUDENT IN AN ORGANIZED HEALTH CARE EDUCATION/TRAINING PROGRAM

## 2018-01-01 PROCEDURE — 93005 ELECTROCARDIOGRAM TRACING: CPT | Mod: NTX

## 2018-01-01 PROCEDURE — 94761 N-INVAS EAR/PLS OXIMETRY MLT: CPT | Mod: NTX

## 2018-01-01 PROCEDURE — 82248 BILIRUBIN DIRECT: CPT

## 2018-01-01 PROCEDURE — 94660 CPAP INITIATION&MGMT: CPT

## 2018-01-01 PROCEDURE — 4A033B3 MEASUREMENT OF ARTERIAL PRESSURE, PULMONARY, PERCUTANEOUS APPROACH: ICD-10-PCS | Performed by: INTERNAL MEDICINE

## 2018-01-01 PROCEDURE — 99900035 HC TECH TIME PER 15 MIN (STAT)

## 2018-01-01 PROCEDURE — 85025 COMPLETE CBC W/AUTO DIFF WBC: CPT | Mod: NTX

## 2018-01-01 PROCEDURE — A4216 STERILE WATER/SALINE, 10 ML: HCPCS | Mod: NTX | Performed by: INTERNAL MEDICINE

## 2018-01-01 PROCEDURE — 99215 OFFICE O/P EST HI 40 MIN: CPT | Mod: S$PBB,,, | Performed by: INTERNAL MEDICINE

## 2018-01-01 PROCEDURE — 83735 ASSAY OF MAGNESIUM: CPT | Mod: 91,NTX

## 2018-01-01 PROCEDURE — 20600001 HC STEP DOWN PRIVATE ROOM

## 2018-01-01 PROCEDURE — 85007 BL SMEAR W/DIFF WBC COUNT: CPT

## 2018-01-01 PROCEDURE — 25000003 PHARM REV CODE 250: Performed by: NURSE PRACTITIONER

## 2018-01-01 PROCEDURE — 93010 ELECTROCARDIOGRAM REPORT: CPT | Mod: NTX,,, | Performed by: INTERNAL MEDICINE

## 2018-01-01 PROCEDURE — 99232 SBSQ HOSP IP/OBS MODERATE 35: CPT | Mod: ,,, | Performed by: INTERNAL MEDICINE

## 2018-01-01 PROCEDURE — 63600175 PHARM REV CODE 636 W HCPCS: Performed by: STUDENT IN AN ORGANIZED HEALTH CARE EDUCATION/TRAINING PROGRAM

## 2018-01-01 PROCEDURE — 80053 COMPREHEN METABOLIC PANEL: CPT | Mod: NTX

## 2018-01-01 PROCEDURE — 27000190 HC CPAP FULL FACE MASK W/VALVE: Mod: NTX

## 2018-01-01 PROCEDURE — 80048 BASIC METABOLIC PNL TOTAL CA: CPT | Mod: 91

## 2018-01-01 PROCEDURE — 51702 INSERT TEMP BLADDER CATH: CPT | Mod: NTX

## 2018-01-01 PROCEDURE — 81001 URINALYSIS AUTO W/SCOPE: CPT | Mod: NTX

## 2018-01-01 PROCEDURE — 99213 OFFICE O/P EST LOW 20 MIN: CPT | Mod: PBBFAC | Performed by: INTERNAL MEDICINE

## 2018-01-01 PROCEDURE — 85027 COMPLETE CBC AUTOMATED: CPT

## 2018-01-01 PROCEDURE — 83735 ASSAY OF MAGNESIUM: CPT | Mod: NTX

## 2018-01-01 PROCEDURE — 85651 RBC SED RATE NONAUTOMATED: CPT

## 2018-01-01 PROCEDURE — 85610 PROTHROMBIN TIME: CPT | Mod: NTX

## 2018-01-01 PROCEDURE — 97530 THERAPEUTIC ACTIVITIES: CPT | Mod: NTX

## 2018-01-01 PROCEDURE — 99231 SBSQ HOSP IP/OBS SF/LOW 25: CPT | Mod: ,,, | Performed by: INTERNAL MEDICINE

## 2018-01-01 PROCEDURE — 80074 ACUTE HEPATITIS PANEL: CPT

## 2018-01-01 PROCEDURE — 97802 MEDICAL NUTRITION INDIV IN: CPT | Performed by: DIETITIAN, REGISTERED

## 2018-01-01 PROCEDURE — 97530 THERAPEUTIC ACTIVITIES: CPT

## 2018-01-01 PROCEDURE — 02HV33Z INSERTION OF INFUSION DEVICE INTO SUPERIOR VENA CAVA, PERCUTANEOUS APPROACH: ICD-10-PCS | Performed by: INTERNAL MEDICINE

## 2018-01-01 PROCEDURE — 80048 BASIC METABOLIC PNL TOTAL CA: CPT

## 2018-01-01 PROCEDURE — 94761 N-INVAS EAR/PLS OXIMETRY MLT: CPT

## 2018-01-01 PROCEDURE — 80202 ASSAY OF VANCOMYCIN: CPT

## 2018-01-01 PROCEDURE — 51701 INSERT BLADDER CATHETER: CPT | Mod: NTX

## 2018-01-01 PROCEDURE — 99238 HOSP IP/OBS DSCHRG MGMT 30/<: CPT | Mod: ,,, | Performed by: INTERNAL MEDICINE

## 2018-01-01 PROCEDURE — 36556 INSERT NON-TUNNEL CV CATH: CPT | Mod: NTX

## 2018-01-01 PROCEDURE — 27000221 HC OXYGEN, UP TO 24 HOURS: Mod: NTX

## 2018-01-01 PROCEDURE — 82803 BLOOD GASES ANY COMBINATION: CPT

## 2018-01-01 PROCEDURE — 84100 ASSAY OF PHOSPHORUS: CPT | Mod: NTX

## 2018-01-01 PROCEDURE — 93306 TTE W/DOPPLER COMPLETE: CPT | Mod: NTX

## 2018-01-01 PROCEDURE — 20000000 HC ICU ROOM

## 2018-01-01 PROCEDURE — 99214 OFFICE O/P EST MOD 30 MIN: CPT | Mod: S$PBB,,, | Performed by: INTERNAL MEDICINE

## 2018-01-01 PROCEDURE — 99223 1ST HOSP IP/OBS HIGH 75: CPT | Mod: NTX,,, | Performed by: INTERNAL MEDICINE

## 2018-01-01 PROCEDURE — 36415 COLL VENOUS BLD VENIPUNCTURE: CPT | Mod: NTX

## 2018-01-01 PROCEDURE — 93451 RIGHT HEART CATH: CPT | Mod: 26,,, | Performed by: INTERNAL MEDICINE

## 2018-01-01 PROCEDURE — C1894 INTRO/SHEATH, NON-LASER: HCPCS

## 2018-01-01 RX ORDER — LANOLIN ALCOHOL/MO/W.PET/CERES
800 CREAM (GRAM) TOPICAL ONCE
Status: COMPLETED | OUTPATIENT
Start: 2018-01-01 | End: 2018-01-01

## 2018-01-01 RX ORDER — DOBUTAMINE HYDROCHLORIDE 400 MG/100ML
5 INJECTION, SOLUTION INTRAVENOUS CONTINUOUS
Status: DISCONTINUED | OUTPATIENT
Start: 2018-01-01 | End: 2018-01-01

## 2018-01-01 RX ORDER — METOLAZONE 5 MG/1
5 TABLET ORAL ONCE
Status: COMPLETED | OUTPATIENT
Start: 2018-01-01 | End: 2018-01-01

## 2018-01-01 RX ORDER — FUROSEMIDE 10 MG/ML
80 INJECTION INTRAMUSCULAR; INTRAVENOUS ONCE
Status: COMPLETED | OUTPATIENT
Start: 2018-01-01 | End: 2018-01-01

## 2018-01-01 RX ORDER — PANTOPRAZOLE SODIUM 40 MG/1
40 TABLET, DELAYED RELEASE ORAL DAILY
Qty: 30 TABLET | Refills: 0 | Status: SHIPPED | OUTPATIENT
Start: 2018-01-01 | End: 2019-06-01

## 2018-01-01 RX ORDER — ACETAMINOPHEN 325 MG/1
650 TABLET ORAL EVERY 4 HOURS PRN
Status: DISCONTINUED | OUTPATIENT
Start: 2018-01-01 | End: 2018-01-01 | Stop reason: HOSPADM

## 2018-01-01 RX ORDER — POTASSIUM CHLORIDE 20 MEQ/1
40 TABLET, EXTENDED RELEASE ORAL 2 TIMES DAILY
Status: DISCONTINUED | OUTPATIENT
Start: 2018-01-01 | End: 2018-01-01

## 2018-01-01 RX ORDER — METOLAZONE 5 MG/1
10 TABLET ORAL DAILY
Status: DISCONTINUED | OUTPATIENT
Start: 2018-01-01 | End: 2018-01-01

## 2018-01-01 RX ORDER — ENOXAPARIN SODIUM 100 MG/ML
40 INJECTION SUBCUTANEOUS EVERY 24 HOURS
Status: DISCONTINUED | OUTPATIENT
Start: 2018-01-01 | End: 2018-01-01

## 2018-01-01 RX ORDER — POTASSIUM CHLORIDE 20 MEQ/1
40 TABLET, EXTENDED RELEASE ORAL DAILY
Status: DISCONTINUED | OUTPATIENT
Start: 2018-01-01 | End: 2018-01-01

## 2018-01-01 RX ORDER — LANOLIN ALCOHOL/MO/W.PET/CERES
800 CREAM (GRAM) TOPICAL 2 TIMES DAILY
Qty: 120 TABLET | Refills: 0 | Status: SHIPPED | OUTPATIENT
Start: 2018-01-01 | End: 2018-01-01 | Stop reason: SDUPTHER

## 2018-01-01 RX ORDER — SODIUM CHLORIDE 0.9 % (FLUSH) 0.9 %
3 SYRINGE (ML) INJECTION EVERY 8 HOURS
Status: DISCONTINUED | OUTPATIENT
Start: 2018-01-01 | End: 2018-01-01 | Stop reason: HOSPADM

## 2018-01-01 RX ORDER — VALSARTAN 40 MG/1
40 TABLET ORAL DAILY
Qty: 90 TABLET | Refills: 3 | Status: SHIPPED | OUTPATIENT
Start: 2018-01-01 | End: 2019-05-02

## 2018-01-01 RX ORDER — CEPHALEXIN 500 MG/1
500 CAPSULE ORAL EVERY 8 HOURS
Status: DISCONTINUED | OUTPATIENT
Start: 2018-01-01 | End: 2018-01-01 | Stop reason: HOSPADM

## 2018-01-01 RX ORDER — GABAPENTIN 400 MG/1
400 CAPSULE ORAL 3 TIMES DAILY
Status: DISCONTINUED | OUTPATIENT
Start: 2018-01-01 | End: 2018-01-01 | Stop reason: HOSPADM

## 2018-01-01 RX ORDER — POTASSIUM CHLORIDE 20 MEQ/1
TABLET, EXTENDED RELEASE ORAL
Qty: 130 TABLET | Refills: 6 | Status: SHIPPED | OUTPATIENT
Start: 2018-01-01 | End: 2018-01-01 | Stop reason: SDUPTHER

## 2018-01-01 RX ORDER — PANTOPRAZOLE SODIUM 40 MG/1
40 TABLET, DELAYED RELEASE ORAL DAILY
Status: DISCONTINUED | OUTPATIENT
Start: 2018-01-01 | End: 2018-01-01 | Stop reason: HOSPADM

## 2018-01-01 RX ORDER — VALSARTAN 40 MG/1
40 TABLET ORAL 2 TIMES DAILY
Status: DISCONTINUED | OUTPATIENT
Start: 2018-01-01 | End: 2018-01-01

## 2018-01-01 RX ORDER — BUMETANIDE 1 MG/1
TABLET ORAL
COMMUNITY
Start: 2018-01-01 | End: 2018-01-01

## 2018-01-01 RX ORDER — METOLAZONE 5 MG/1
5 TABLET ORAL DAILY
Status: DISCONTINUED | OUTPATIENT
Start: 2018-01-01 | End: 2018-01-01

## 2018-01-01 RX ORDER — VALSARTAN 40 MG/1
40 TABLET ORAL DAILY
Qty: 30 TABLET | Refills: 11 | Status: SHIPPED | OUTPATIENT
Start: 2018-01-01 | End: 2019-05-02

## 2018-01-01 RX ORDER — NAPROXEN SODIUM 220 MG/1
81 TABLET, FILM COATED ORAL DAILY
Status: DISCONTINUED | OUTPATIENT
Start: 2018-01-01 | End: 2018-01-01

## 2018-01-01 RX ORDER — CETIRIZINE HYDROCHLORIDE 10 MG/1
10 TABLET ORAL DAILY
Status: DISCONTINUED | OUTPATIENT
Start: 2018-01-01 | End: 2018-01-01

## 2018-01-01 RX ORDER — DOXYCYCLINE HYCLATE 100 MG
100 TABLET ORAL EVERY 12 HOURS
Qty: 10 TABLET | Refills: 0 | Status: SHIPPED | OUTPATIENT
Start: 2018-01-01 | End: 2018-01-01

## 2018-01-01 RX ORDER — COLCHICINE 0.6 MG/1
1.2 TABLET, FILM COATED ORAL ONCE
Status: COMPLETED | OUTPATIENT
Start: 2018-01-01 | End: 2018-01-01

## 2018-01-01 RX ORDER — LANOLIN ALCOHOL/MO/W.PET/CERES
400 CREAM (GRAM) TOPICAL DAILY
Status: DISCONTINUED | OUTPATIENT
Start: 2018-01-01 | End: 2018-01-01

## 2018-01-01 RX ORDER — CEPHALEXIN 500 MG/1
500 CAPSULE ORAL EVERY 8 HOURS
Qty: 15 CAPSULE | Refills: 0 | Status: SHIPPED | OUTPATIENT
Start: 2018-01-01 | End: 2018-01-01

## 2018-01-01 RX ORDER — LANOLIN ALCOHOL/MO/W.PET/CERES
400 CREAM (GRAM) TOPICAL ONCE
Status: COMPLETED | OUTPATIENT
Start: 2018-01-01 | End: 2018-01-01

## 2018-01-01 RX ORDER — LANOLIN ALCOHOL/MO/W.PET/CERES
800 CREAM (GRAM) TOPICAL ONCE
Status: DISCONTINUED | OUTPATIENT
Start: 2018-01-01 | End: 2018-01-01 | Stop reason: HOSPADM

## 2018-01-01 RX ORDER — DOBUTAMINE HYDROCHLORIDE 400 MG/100ML
5 INJECTION, SOLUTION INTRAVENOUS CONTINUOUS
Status: DISCONTINUED | OUTPATIENT
Start: 2018-01-01 | End: 2018-01-01 | Stop reason: HOSPADM

## 2018-01-01 RX ORDER — SODIUM CHLORIDE 0.9 % (FLUSH) 0.9 %
10 SYRINGE (ML) INJECTION
Status: DISCONTINUED | OUTPATIENT
Start: 2018-01-01 | End: 2018-01-01 | Stop reason: HOSPADM

## 2018-01-01 RX ORDER — FUROSEMIDE 80 MG/1
80 TABLET ORAL 2 TIMES DAILY
Status: DISCONTINUED | OUTPATIENT
Start: 2018-01-01 | End: 2018-01-01 | Stop reason: HOSPADM

## 2018-01-01 RX ORDER — SODIUM CHLORIDE 0.9 % (FLUSH) 0.9 %
10 SYRINGE (ML) INJECTION EVERY 6 HOURS
Status: DISCONTINUED | OUTPATIENT
Start: 2018-01-01 | End: 2018-01-01 | Stop reason: HOSPADM

## 2018-01-01 RX ORDER — GABAPENTIN 400 MG/1
400 CAPSULE ORAL 3 TIMES DAILY
Qty: 90 CAPSULE | Refills: 0 | Status: SHIPPED | OUTPATIENT
Start: 2018-01-01 | End: 2019-04-12

## 2018-01-01 RX ORDER — HYDROCODONE BITARTRATE AND ACETAMINOPHEN 5; 325 MG/1; MG/1
1 TABLET ORAL EVERY 6 HOURS PRN
Status: DISCONTINUED | OUTPATIENT
Start: 2018-01-01 | End: 2018-01-01 | Stop reason: HOSPADM

## 2018-01-01 RX ORDER — VANCOMYCIN/0.9 % SOD CHLORIDE 1 G/100 ML
1000 PLASTIC BAG, INJECTION (ML) INTRAVENOUS
Status: DISCONTINUED | OUTPATIENT
Start: 2018-01-01 | End: 2018-01-01

## 2018-01-01 RX ORDER — ISOSORBIDE DINITRATE 20 MG/1
20 TABLET ORAL 3 TIMES DAILY
Status: DISCONTINUED | OUTPATIENT
Start: 2018-01-01 | End: 2018-01-01

## 2018-01-01 RX ORDER — SPIRONOLACTONE 25 MG/1
25 TABLET ORAL DAILY
Status: DISCONTINUED | OUTPATIENT
Start: 2018-01-01 | End: 2018-01-01 | Stop reason: HOSPADM

## 2018-01-01 RX ORDER — POTASSIUM CHLORIDE 20 MEQ/1
20 TABLET, EXTENDED RELEASE ORAL DAILY
Qty: 30 TABLET | Refills: 12 | Status: SHIPPED | OUTPATIENT
Start: 2018-01-01 | End: 2018-01-01 | Stop reason: SDUPTHER

## 2018-01-01 RX ORDER — COLCHICINE 0.6 MG/1
0.6 TABLET, FILM COATED ORAL ONCE
Status: COMPLETED | OUTPATIENT
Start: 2018-01-01 | End: 2018-01-01

## 2018-01-01 RX ORDER — FUROSEMIDE 80 MG/1
80 TABLET ORAL 2 TIMES DAILY
Qty: 60 TABLET | Refills: 0 | Status: SHIPPED | OUTPATIENT
Start: 2018-01-01 | End: 2018-01-01 | Stop reason: SDUPTHER

## 2018-01-01 RX ORDER — PANTOPRAZOLE SODIUM 40 MG/1
40 TABLET, DELAYED RELEASE ORAL DAILY
Qty: 30 TABLET | Refills: 0 | Status: SHIPPED | OUTPATIENT
Start: 2018-01-01 | End: 2018-01-01 | Stop reason: SDUPTHER

## 2018-01-01 RX ORDER — LANOLIN ALCOHOL/MO/W.PET/CERES
800 CREAM (GRAM) TOPICAL 2 TIMES DAILY
Status: DISCONTINUED | OUTPATIENT
Start: 2018-01-01 | End: 2018-01-01 | Stop reason: HOSPADM

## 2018-01-01 RX ORDER — LATANOPROST 50 UG/ML
1 SOLUTION/ DROPS OPHTHALMIC NIGHTLY
Status: DISCONTINUED | OUTPATIENT
Start: 2018-01-01 | End: 2018-01-01 | Stop reason: HOSPADM

## 2018-01-01 RX ORDER — MORPHINE SULFATE 2 MG/ML
1 INJECTION, SOLUTION INTRAMUSCULAR; INTRAVENOUS ONCE
Status: DISCONTINUED | OUTPATIENT
Start: 2018-01-01 | End: 2018-01-01

## 2018-01-01 RX ORDER — POTASSIUM CHLORIDE 20 MEQ/1
40 TABLET, EXTENDED RELEASE ORAL 2 TIMES DAILY
Qty: 30 TABLET | Refills: 12 | Status: SHIPPED | OUTPATIENT
Start: 2018-01-01 | End: 2018-01-01 | Stop reason: SDUPTHER

## 2018-01-01 RX ORDER — LANOLIN ALCOHOL/MO/W.PET/CERES
800 CREAM (GRAM) TOPICAL 2 TIMES DAILY
Qty: 120 TABLET | Refills: 0 | Status: SHIPPED | OUTPATIENT
Start: 2018-01-01

## 2018-01-01 RX ORDER — HYDRALAZINE HYDROCHLORIDE 25 MG/1
25 TABLET, FILM COATED ORAL 3 TIMES DAILY
Status: DISCONTINUED | OUTPATIENT
Start: 2018-01-01 | End: 2018-01-01

## 2018-01-01 RX ORDER — DOBUTAMINE HYDROCHLORIDE 400 MG/100ML
5 INJECTION, SOLUTION INTRAVENOUS CONTINUOUS
Qty: 250 ML
Start: 2018-01-01

## 2018-01-01 RX ORDER — VALSARTAN 40 MG/1
40 TABLET ORAL DAILY
Qty: 30 TABLET | Refills: 11 | Status: SHIPPED | OUTPATIENT
Start: 2018-01-01 | End: 2018-01-01 | Stop reason: SDUPTHER

## 2018-01-01 RX ORDER — POTASSIUM CHLORIDE 20 MEQ/1
40 TABLET, EXTENDED RELEASE ORAL ONCE
Status: COMPLETED | OUTPATIENT
Start: 2018-01-01 | End: 2018-01-01

## 2018-01-01 RX ORDER — DOXYCYCLINE HYCLATE 100 MG
100 TABLET ORAL EVERY 12 HOURS
Status: DISCONTINUED | OUTPATIENT
Start: 2018-01-01 | End: 2018-01-01 | Stop reason: HOSPADM

## 2018-01-01 RX ORDER — LISINOPRIL 2.5 MG/1
2.5 TABLET ORAL
COMMUNITY
Start: 2018-01-01 | End: 2018-01-01

## 2018-01-01 RX ORDER — ISOSORBIDE DINITRATE 10 MG/1
10 TABLET ORAL 3 TIMES DAILY
Status: DISCONTINUED | OUTPATIENT
Start: 2018-01-01 | End: 2018-01-01

## 2018-01-01 RX ORDER — POTASSIUM CHLORIDE 20 MEQ/1
TABLET, EXTENDED RELEASE ORAL
Qty: 200 TABLET | Refills: 6 | Status: SHIPPED | OUTPATIENT
Start: 2018-01-01

## 2018-01-01 RX ORDER — FUROSEMIDE 80 MG/1
80 TABLET ORAL 2 TIMES DAILY
Qty: 60 TABLET | Refills: 0 | Status: SHIPPED | OUTPATIENT
Start: 2018-01-01 | End: 2019-06-01

## 2018-01-01 RX ORDER — SPIRONOLACTONE 25 MG/1
25 TABLET ORAL DAILY
Qty: 30 TABLET | Refills: 0 | Status: SHIPPED | OUTPATIENT
Start: 2018-01-01 | End: 2018-01-01 | Stop reason: SDUPTHER

## 2018-01-01 RX ORDER — SPIRONOLACTONE 25 MG/1
25 TABLET ORAL 2 TIMES DAILY
Qty: 60 TABLET | Refills: 6 | Status: SHIPPED | OUTPATIENT
Start: 2018-01-01 | End: 2019-05-02

## 2018-01-01 RX ADMIN — GABAPENTIN 400 MG: 100 CAPSULE ORAL at 10:04

## 2018-01-01 RX ADMIN — CHLOROTHIAZIDE 250 MG: 250 SUSPENSION ORAL at 03:03

## 2018-01-01 RX ADMIN — SPIRONOLACTONE 25 MG: 25 TABLET, FILM COATED ORAL at 08:04

## 2018-01-01 RX ADMIN — ISOSORBIDE DINITRATE 20 MG: 10 TABLET ORAL at 08:04

## 2018-01-01 RX ADMIN — Medication 10 ML: at 12:04

## 2018-01-01 RX ADMIN — PANTOPRAZOLE SODIUM 40 MG: 40 TABLET, DELAYED RELEASE ORAL at 09:04

## 2018-01-01 RX ADMIN — SPIRONOLACTONE 25 MG: 25 TABLET, FILM COATED ORAL at 09:04

## 2018-01-01 RX ADMIN — ISOSORBIDE DINITRATE 20 MG: 10 TABLET ORAL at 09:04

## 2018-01-01 RX ADMIN — DOBUTAMINE IN DEXTROSE 5 MCG/KG/MIN: 200 INJECTION, SOLUTION INTRAVENOUS at 01:03

## 2018-01-01 RX ADMIN — LATANOPROST 1 DROP: 50 SOLUTION OPHTHALMIC at 09:04

## 2018-01-01 RX ADMIN — APIXABAN 5 MG: 5 TABLET, FILM COATED ORAL at 09:04

## 2018-01-01 RX ADMIN — FUROSEMIDE 80 MG: 80 TABLET ORAL at 06:04

## 2018-01-01 RX ADMIN — MAGNESIUM OXIDE TAB 400 MG (241.3 MG ELEMENTAL MG) 800 MG: 400 (241.3 MG) TAB at 08:04

## 2018-01-01 RX ADMIN — ACETAMINOPHEN 650 MG: 325 TABLET ORAL at 09:04

## 2018-01-01 RX ADMIN — CHLOROTHIAZIDE 500 MG: 250 SUSPENSION ORAL at 09:04

## 2018-01-01 RX ADMIN — FUROSEMIDE 30 MG/HR: 10 INJECTION, SOLUTION INTRAVENOUS at 11:04

## 2018-01-01 RX ADMIN — MAGNESIUM OXIDE TAB 400 MG (241.3 MG ELEMENTAL MG) 800 MG: 400 (241.3 MG) TAB at 09:04

## 2018-01-01 RX ADMIN — Medication 3 ML: at 09:04

## 2018-01-01 RX ADMIN — CEPHALEXIN 500 MG: 500 CAPSULE ORAL at 09:04

## 2018-01-01 RX ADMIN — PANTOPRAZOLE SODIUM 40 MG: 40 TABLET, DELAYED RELEASE ORAL at 08:04

## 2018-01-01 RX ADMIN — ASPIRIN 81 MG CHEWABLE TABLET 81 MG: 81 TABLET CHEWABLE at 09:04

## 2018-01-01 RX ADMIN — ISOSORBIDE DINITRATE 20 MG: 10 TABLET ORAL at 02:04

## 2018-01-01 RX ADMIN — FUROSEMIDE 30 MG/HR: 10 INJECTION, SOLUTION INTRAVENOUS at 01:04

## 2018-01-01 RX ADMIN — Medication 10 ML: at 06:04

## 2018-01-01 RX ADMIN — FUROSEMIDE 30 MG/HR: 10 INJECTION, SOLUTION INTRAVENOUS at 03:03

## 2018-01-01 RX ADMIN — VALSARTAN 40 MG: 40 TABLET ORAL at 09:04

## 2018-01-01 RX ADMIN — FUROSEMIDE 30 MG/HR: 10 INJECTION, SOLUTION INTRAVENOUS at 06:03

## 2018-01-01 RX ADMIN — FUROSEMIDE 20 MG/HR: 10 INJECTION, SOLUTION INTRAVENOUS at 07:04

## 2018-01-01 RX ADMIN — GABAPENTIN 400 MG: 100 CAPSULE ORAL at 09:04

## 2018-01-01 RX ADMIN — FUROSEMIDE 30 MG/HR: 10 INJECTION, SOLUTION INTRAVENOUS at 06:04

## 2018-01-01 RX ADMIN — FUROSEMIDE 80 MG: 80 TABLET ORAL at 09:04

## 2018-01-01 RX ADMIN — CHLOROTHIAZIDE 250 MG: 250 SUSPENSION ORAL at 09:03

## 2018-01-01 RX ADMIN — APIXABAN 5 MG: 5 TABLET, FILM COATED ORAL at 10:04

## 2018-01-01 RX ADMIN — SPIRONOLACTONE 25 MG: 25 TABLET, FILM COATED ORAL at 10:04

## 2018-01-01 RX ADMIN — APIXABAN 5 MG: 5 TABLET, FILM COATED ORAL at 09:03

## 2018-01-01 RX ADMIN — FUROSEMIDE 80 MG: 10 INJECTION, SOLUTION INTRAMUSCULAR; INTRAVENOUS at 06:03

## 2018-01-01 RX ADMIN — GABAPENTIN 400 MG: 100 CAPSULE ORAL at 03:04

## 2018-01-01 RX ADMIN — POTASSIUM CHLORIDE 40 MEQ: 1500 TABLET, EXTENDED RELEASE ORAL at 09:04

## 2018-01-01 RX ADMIN — IOHEXOL 100 ML: 350 INJECTION, SOLUTION INTRAVENOUS at 06:04

## 2018-01-01 RX ADMIN — DOBUTAMINE IN DEXTROSE 5 MCG/KG/MIN: 200 INJECTION, SOLUTION INTRAVENOUS at 09:04

## 2018-01-01 RX ADMIN — LATANOPROST 1 DROP: 50 SOLUTION OPHTHALMIC at 08:04

## 2018-01-01 RX ADMIN — CEPHALEXIN 500 MG: 500 CAPSULE ORAL at 06:04

## 2018-01-01 RX ADMIN — Medication 10 ML: at 07:04

## 2018-01-01 RX ADMIN — APIXABAN 5 MG: 5 TABLET, FILM COATED ORAL at 08:04

## 2018-01-01 RX ADMIN — DOBUTAMINE IN DEXTROSE 5 MCG/KG/MIN: 200 INJECTION, SOLUTION INTRAVENOUS at 03:04

## 2018-01-01 RX ADMIN — DOXYCYCLINE HYCLATE 100 MG: 100 TABLET, COATED ORAL at 09:04

## 2018-01-01 RX ADMIN — Medication 3 ML: at 05:03

## 2018-01-01 RX ADMIN — ASPIRIN 81 MG CHEWABLE TABLET 81 MG: 81 TABLET CHEWABLE at 09:03

## 2018-01-01 RX ADMIN — DOBUTAMINE IN DEXTROSE 2.5 MCG/KG/MIN: 200 INJECTION, SOLUTION INTRAVENOUS at 02:04

## 2018-01-01 RX ADMIN — DOBUTAMINE IN DEXTROSE 5 MCG/KG/MIN: 200 INJECTION, SOLUTION INTRAVENOUS at 10:04

## 2018-01-01 RX ADMIN — FUROSEMIDE 30 MG/HR: 10 INJECTION, SOLUTION INTRAVENOUS at 08:04

## 2018-01-01 RX ADMIN — DOBUTAMINE IN DEXTROSE 5 MCG/KG/MIN: 200 INJECTION, SOLUTION INTRAVENOUS at 03:03

## 2018-01-01 RX ADMIN — FUROSEMIDE 30 MG/HR: 10 INJECTION, SOLUTION INTRAVENOUS at 09:04

## 2018-01-01 RX ADMIN — Medication 3 ML: at 06:04

## 2018-01-01 RX ADMIN — VALSARTAN 40 MG: 40 TABLET ORAL at 08:04

## 2018-01-01 RX ADMIN — DOBUTAMINE IN DEXTROSE 5 MCG/KG/MIN: 200 INJECTION, SOLUTION INTRAVENOUS at 04:03

## 2018-01-01 RX ADMIN — POTASSIUM CHLORIDE 40 MEQ: 1500 TABLET, EXTENDED RELEASE ORAL at 09:03

## 2018-01-01 RX ADMIN — DOBUTAMINE IN DEXTROSE 5 MCG/KG/MIN: 200 INJECTION, SOLUTION INTRAVENOUS at 02:03

## 2018-01-01 RX ADMIN — MAGNESIUM SULFATE HEPTAHYDRATE 1 G: 500 INJECTION, SOLUTION INTRAMUSCULAR; INTRAVENOUS at 02:04

## 2018-01-01 RX ADMIN — VANCOMYCIN HYDROCHLORIDE 750 MG: 1 INJECTION, POWDER, LYOPHILIZED, FOR SOLUTION INTRAVENOUS at 01:04

## 2018-01-01 RX ADMIN — Medication 3 ML: at 05:04

## 2018-01-01 RX ADMIN — FUROSEMIDE 20 MG/HR: 10 INJECTION, SOLUTION INTRAVENOUS at 05:04

## 2018-01-01 RX ADMIN — VALSARTAN 40 MG: 40 TABLET ORAL at 10:04

## 2018-01-01 RX ADMIN — LATANOPROST 1 DROP: 50 SOLUTION OPHTHALMIC at 10:03

## 2018-01-01 RX ADMIN — CEPHALEXIN 500 MG: 500 CAPSULE ORAL at 01:04

## 2018-01-01 RX ADMIN — CHLOROTHIAZIDE 500 MG: 250 SUSPENSION ORAL at 10:03

## 2018-01-01 RX ADMIN — FUROSEMIDE 30 MG/HR: 10 INJECTION, SOLUTION INTRAVENOUS at 07:04

## 2018-01-01 RX ADMIN — LATANOPROST 1 DROP: 50 SOLUTION OPHTHALMIC at 04:03

## 2018-01-01 RX ADMIN — MAGNESIUM OXIDE TAB 400 MG (241.3 MG ELEMENTAL MG) 400 MG: 400 (241.3 MG) TAB at 07:03

## 2018-01-01 RX ADMIN — Medication 3 ML: at 10:04

## 2018-01-01 RX ADMIN — GABAPENTIN 400 MG: 100 CAPSULE ORAL at 04:04

## 2018-01-01 RX ADMIN — ASPIRIN 81 MG CHEWABLE TABLET 81 MG: 81 TABLET CHEWABLE at 08:04

## 2018-01-01 RX ADMIN — CHLOROTHIAZIDE 500 MG: 250 SUSPENSION ORAL at 08:04

## 2018-01-01 RX ADMIN — Medication 3 ML: at 01:04

## 2018-01-01 RX ADMIN — MAGNESIUM OXIDE TAB 400 MG (241.3 MG ELEMENTAL MG) 800 MG: 400 (241.3 MG) TAB at 09:03

## 2018-01-01 RX ADMIN — SPIRONOLACTONE 25 MG: 25 TABLET, FILM COATED ORAL at 09:03

## 2018-01-01 RX ADMIN — MAGNESIUM SULFATE HEPTAHYDRATE 1 G: 500 INJECTION, SOLUTION INTRAMUSCULAR; INTRAVENOUS at 10:04

## 2018-01-01 RX ADMIN — DOBUTAMINE IN DEXTROSE 5 MCG/KG/MIN: 200 INJECTION, SOLUTION INTRAVENOUS at 07:04

## 2018-01-01 RX ADMIN — CHLOROTHIAZIDE 500 MG: 250 SUSPENSION ORAL at 10:04

## 2018-01-01 RX ADMIN — LATANOPROST 1 DROP: 50 SOLUTION OPHTHALMIC at 09:03

## 2018-01-01 RX ADMIN — Medication 3 ML: at 02:04

## 2018-01-01 RX ADMIN — POTASSIUM CHLORIDE 40 MEQ: 1500 TABLET, EXTENDED RELEASE ORAL at 08:04

## 2018-01-01 RX ADMIN — CETIRIZINE HYDROCHLORIDE 10 MG: 10 TABLET, FILM COATED ORAL at 10:03

## 2018-01-01 RX ADMIN — FUROSEMIDE 10 MG/HR: 10 INJECTION, SOLUTION INTRAVENOUS at 04:03

## 2018-01-01 RX ADMIN — FUROSEMIDE 10 MG/HR: 10 INJECTION, SOLUTION INTRAVENOUS at 05:04

## 2018-01-01 RX ADMIN — DOBUTAMINE IN DEXTROSE 5 MCG/KG/MIN: 200 INJECTION, SOLUTION INTRAVENOUS at 11:04

## 2018-01-01 RX ADMIN — Medication 3 ML: at 09:03

## 2018-01-01 RX ADMIN — FUROSEMIDE 30 MG/HR: 10 INJECTION, SOLUTION INTRAVENOUS at 11:03

## 2018-01-01 RX ADMIN — FUROSEMIDE 20 MG/HR: 10 INJECTION, SOLUTION INTRAVENOUS at 01:04

## 2018-01-01 RX ADMIN — METOLAZONE 10 MG: 5 TABLET ORAL at 08:04

## 2018-01-01 RX ADMIN — DOBUTAMINE IN DEXTROSE 5 MCG/KG/MIN: 200 INJECTION, SOLUTION INTRAVENOUS at 04:04

## 2018-01-01 RX ADMIN — METOLAZONE 5 MG: 5 TABLET ORAL at 12:04

## 2018-01-01 RX ADMIN — MAGNESIUM OXIDE TAB 400 MG (241.3 MG ELEMENTAL MG) 800 MG: 400 (241.3 MG) TAB at 10:04

## 2018-01-01 RX ADMIN — DOBUTAMINE IN DEXTROSE 5 MCG/KG/MIN: 200 INJECTION, SOLUTION INTRAVENOUS at 02:04

## 2018-01-01 RX ADMIN — PANTOPRAZOLE SODIUM 40 MG: 40 TABLET, DELAYED RELEASE ORAL at 10:04

## 2018-01-01 RX ADMIN — FUROSEMIDE 80 MG: 10 INJECTION, SOLUTION INTRAMUSCULAR; INTRAVENOUS at 01:04

## 2018-01-01 RX ADMIN — PANTOPRAZOLE SODIUM 40 MG: 40 TABLET, DELAYED RELEASE ORAL at 09:03

## 2018-01-01 RX ADMIN — FUROSEMIDE 10 MG/HR: 10 INJECTION, SOLUTION INTRAVENOUS at 10:04

## 2018-01-01 RX ADMIN — CETIRIZINE HYDROCHLORIDE 10 MG: 10 TABLET, FILM COATED ORAL at 09:04

## 2018-01-01 RX ADMIN — GABAPENTIN 400 MG: 100 CAPSULE ORAL at 08:04

## 2018-01-01 RX ADMIN — VANCOMYCIN HYDROCHLORIDE 1 G: 10 INJECTION, POWDER, LYOPHILIZED, FOR SOLUTION INTRAVENOUS at 09:04

## 2018-01-01 RX ADMIN — VANCOMYCIN HYDROCHLORIDE 1 G: 10 INJECTION, POWDER, LYOPHILIZED, FOR SOLUTION INTRAVENOUS at 11:04

## 2018-01-01 RX ADMIN — ISOSORBIDE DINITRATE 20 MG: 10 TABLET ORAL at 09:03

## 2018-01-01 RX ADMIN — CEPHALEXIN 500 MG: 500 CAPSULE ORAL at 04:04

## 2018-01-01 RX ADMIN — FUROSEMIDE 30 MG/HR: 10 INJECTION, SOLUTION INTRAVENOUS at 02:04

## 2018-01-01 RX ADMIN — Medication 1 G: at 08:04

## 2018-01-01 RX ADMIN — DOBUTAMINE IN DEXTROSE 5 MCG/KG/MIN: 200 INJECTION, SOLUTION INTRAVENOUS at 09:03

## 2018-01-01 RX ADMIN — ISOSORBIDE DINITRATE 20 MG: 10 TABLET ORAL at 10:04

## 2018-01-01 RX ADMIN — FUROSEMIDE 30 MG/HR: 10 INJECTION, SOLUTION INTRAVENOUS at 09:03

## 2018-01-01 RX ADMIN — HYDROCODONE BITARTRATE AND ACETAMINOPHEN 1 TABLET: 5; 325 TABLET ORAL at 08:03

## 2018-01-01 RX ADMIN — DOBUTAMINE IN DEXTROSE 5 MCG/KG/MIN: 200 INJECTION, SOLUTION INTRAVENOUS at 12:04

## 2018-01-01 RX ADMIN — LATANOPROST 1 DROP: 50 SOLUTION OPHTHALMIC at 10:04

## 2018-01-01 RX ADMIN — ACETAMINOPHEN 650 MG: 325 TABLET ORAL at 11:03

## 2018-01-01 RX ADMIN — DOBUTAMINE IN DEXTROSE 5 MCG/KG/MIN: 200 INJECTION, SOLUTION INTRAVENOUS at 08:04

## 2018-01-01 RX ADMIN — DOBUTAMINE IN DEXTROSE 2.5 MCG/KG/MIN: 200 INJECTION, SOLUTION INTRAVENOUS at 05:04

## 2018-01-01 RX ADMIN — FUROSEMIDE 30 MG/HR: 10 INJECTION, SOLUTION INTRAVENOUS at 12:03

## 2018-01-01 RX ADMIN — DOBUTAMINE IN DEXTROSE 5 MCG/KG/MIN: 200 INJECTION, SOLUTION INTRAVENOUS at 05:04

## 2018-01-01 RX ADMIN — METOLAZONE 10 MG: 5 TABLET ORAL at 12:04

## 2018-01-01 RX ADMIN — ISOSORBIDE DINITRATE 20 MG: 10 TABLET ORAL at 04:04

## 2018-01-01 RX ADMIN — MAGNESIUM OXIDE TAB 400 MG (241.3 MG ELEMENTAL MG) 800 MG: 400 (241.3 MG) TAB at 11:03

## 2018-01-01 RX ADMIN — FUROSEMIDE 10 MG/HR: 10 INJECTION, SOLUTION INTRAVENOUS at 06:04

## 2018-01-01 RX ADMIN — GABAPENTIN 400 MG: 100 CAPSULE ORAL at 02:04

## 2018-01-01 RX ADMIN — HYDROCODONE BITARTRATE AND ACETAMINOPHEN 1 TABLET: 5; 325 TABLET ORAL at 10:04

## 2018-01-01 RX ADMIN — FUROSEMIDE 30 MG/HR: 10 INJECTION, SOLUTION INTRAVENOUS at 05:04

## 2018-01-01 RX ADMIN — FUROSEMIDE 80 MG: 10 INJECTION, SOLUTION INTRAMUSCULAR; INTRAVENOUS at 04:03

## 2018-01-01 RX ADMIN — ASPIRIN 81 MG CHEWABLE TABLET 81 MG: 81 TABLET CHEWABLE at 10:04

## 2018-01-01 RX ADMIN — POTASSIUM CHLORIDE 40 MEQ: 1500 TABLET, EXTENDED RELEASE ORAL at 02:04

## 2018-01-01 RX ADMIN — FUROSEMIDE 80 MG: 10 INJECTION, SOLUTION INTRAMUSCULAR; INTRAVENOUS at 11:03

## 2018-01-01 RX ADMIN — COLCHICINE 0.6 MG: 0.6 TABLET, FILM COATED ORAL at 11:04

## 2018-01-01 RX ADMIN — ISOSORBIDE DINITRATE 20 MG: 10 TABLET ORAL at 03:03

## 2018-01-01 RX ADMIN — CETIRIZINE HYDROCHLORIDE 10 MG: 10 TABLET, FILM COATED ORAL at 09:03

## 2018-01-01 RX ADMIN — FUROSEMIDE 30 MG/HR: 10 INJECTION, SOLUTION INTRAVENOUS at 04:04

## 2018-01-01 RX ADMIN — Medication 3 ML: at 02:03

## 2018-01-01 RX ADMIN — COLCHICINE 1.2 MG: 0.6 TABLET, FILM COATED ORAL at 09:04

## 2018-01-01 RX ADMIN — POTASSIUM CHLORIDE 40 MEQ: 1500 TABLET, EXTENDED RELEASE ORAL at 10:04

## 2018-01-15 ENCOUNTER — TELEPHONE (OUTPATIENT)
Dept: TRANSPLANT | Facility: CLINIC | Age: 28
End: 2018-01-15

## 2018-01-15 NOTE — TELEPHONE ENCOUNTER
"Spoke with pt regarding missed appt today. He reports he is currently hospitalized in [place] for " gallbladder issues then blood in my lungs".  Pt will call once he is discharged in order to reschedule his missed appt.   Will await return call.   "

## 2018-01-29 NOTE — TELEPHONE ENCOUNTER
"Received call from pt stating he was discharged from the hospital but still has fluid in his legs. "They gave me too much fluid while in the hospital".  Offered to schedule pt in clinic this week. Pt declined due to "several doctor appts here".  Appt scheduled for 2/5/18 w/ labs prior.  appt slip mailed.   "

## 2018-02-05 NOTE — TELEPHONE ENCOUNTER
"Spoke with pt regarding missed appt today. He reports he saw local MD on Friday and was admitted to the hospital "to get all this fluid off".     Pt will call once he is discharged in order to schedule a f/u appt.  Will await pt's call.   "

## 2018-03-29 PROBLEM — I50.9 ACUTE DECOMPENSATED HEART FAILURE: Status: ACTIVE | Noted: 2018-01-01

## 2018-03-30 NOTE — SUBJECTIVE & OBJECTIVE
Past Medical History:   Diagnosis Date    Cardiomyopathy     Hyperlipidemia     Hypertension     Obesity     Systolic heart failure secondary to idiopathic cardiomyopathy        History reviewed. No pertinent surgical history.    Review of patient's allergies indicates:  No Known Allergies    No current facility-administered medications on file prior to encounter.      Current Outpatient Prescriptions on File Prior to Encounter   Medication Sig    aspirin 81 MG Chew Take 81 mg by mouth once daily.    carvedilol (COREG) 12.5 MG tablet Take 2 tablets (25 mg total) by mouth 2 (two) times daily.    furosemide (LASIX) 40 MG tablet Take 2 tablets (80 mg total) by mouth 2 (two) times daily.    gabapentin (NEURONTIN) 400 MG capsule Take 400 mg by mouth 3 (three) times daily.    hydrALAZINE (APRESOLINE) 25 MG tablet Take 1 tablet (25 mg total) by mouth 3 (three) times daily.    isosorbide dinitrate (ISORDIL) 20 MG tablet Take 1 tablet (20 mg total) by mouth 3 (three) times daily.    latanoprost 0.005 % ophthalmic solution Apply 1 drop to eye every evening.    loratadine (CLARITIN) 10 mg tablet Take 10 mg by mouth once daily.    nitroGLYCERIN (NITROSTAT) 0.4 MG SL tablet DESIRE EVERY 5 MINUTES AS NEEDED FOR CHEST PAINS    pantoprazole (PROTONIX) 40 MG tablet Take 1 tablet (40 mg total) by mouth once daily. Take 1 pill twice daily for 7 days, then 1x daily thereafter    potassium chloride SA (K-DUR,KLOR-CON) 20 MEQ tablet Take 2 tablets (40 mEq total) by mouth once daily.    sacubitril-valsartan (ENTRESTO)  mg per tablet Take 1 tablet by mouth 2 (two) times daily.    spironolactone (ALDACTONE) 25 MG tablet Take 25 mg by mouth once daily.     Family History     Problem Relation (Age of Onset)    Heart disease Maternal Grandmother, Maternal Grandfather        Social History Main Topics    Smoking status: Never Smoker    Smokeless tobacco: Never Used    Alcohol use No    Drug use: No    Sexual activity:  Not Currently     Review of Systems   Constitution: Negative for chills, fever and malaise/fatigue.   HENT: Negative.    Cardiovascular: Positive for leg swelling. Negative for chest pain, dyspnea on exertion, irregular heartbeat, orthopnea, palpitations and paroxysmal nocturnal dyspnea.   Respiratory: Negative for shortness of breath and wheezing.    Skin: Negative for color change and rash.   Musculoskeletal: Negative for arthritis, back pain and myalgias.   Gastrointestinal: Negative for abdominal pain, constipation, diarrhea and nausea.   Neurological: Negative for dizziness, numbness and paresthesias.   Psychiatric/Behavioral: Negative.      Objective:     Vital Signs (Most Recent):  Temp: 98.2 °F (36.8 °C) (03/30/18 0220)  Pulse: 97 (03/30/18 0220)  Resp: 16 (03/30/18 0220)  BP: 114/73 (03/30/18 0220)  SpO2: 98 % (03/30/18 0220) Vital Signs (24h Range):  Temp:  [98.2 °F (36.8 °C)] 98.2 °F (36.8 °C)  Pulse:  [97] 97  Resp:  [16] 16  SpO2:  [98 %] 98 %  BP: (114)/(73) 114/73        There is no height or weight on file to calculate BMI.    SpO2: 98 %  O2 Device (Oxygen Therapy): room air    No intake or output data in the 24 hours ending 03/30/18 0306    Lines/Drains/Airways          No matching active lines, drains, or airways          Physical Exam   Constitutional: Vital signs are normal. He appears well-developed and well-nourished. He is cooperative.  Non-toxic appearance. No distress.   HENT:   Head: Normocephalic and atraumatic.   Neck: No hepatojugular reflux and no JVD present. Carotid bruit is not present.   Cardiovascular: Normal rate, regular rhythm, S1 normal, S2 normal and normal heart sounds.  Exam reveals no gallop.    No murmur heard.  Pulses:       Radial pulses are 2+ on the right side, and 2+ on the left side.        Dorsalis pedis pulses are 1+ on the right side, and 1+ on the left side.   Anasarca   Pulmonary/Chest: Effort normal. No respiratory distress. He has decreased breath sounds in  the right lower field and the left lower field. He has no wheezes. He has no rhonchi. He has no rales.   Abdominal: Soft. Normal appearance and bowel sounds are normal. He exhibits no distension and no mass. There is no tenderness.   Neurological: He is alert.   Skin: Skin is warm, dry and intact.       Significant Labs: pending  OSH: bnp 15k, Cr 2.4 (baseline 1.5), Na 128, tbili 5.3, lactic 3.9    Significant Imaging:     [facility] (09/12/2017):  D. Hemodynamic Results     RA: 23/14 (14)  RV: 70/20  PW: 40/47 (38)  PA: 68/35 (46)  PA_SAT: 60  AO: 130/83 (99)  PW_SAT: 95  AO_SAT: 97    CONDITION 1 (9/12/2017 13:50:09):  FICKCI: 1.8600  FICKCO: 4.1300  PVR: 155.0000  SVR: 1646.0000    EKG: NSR

## 2018-03-30 NOTE — PROGRESS NOTES
[facility], no acute distress noted at this time. RT will continue to monitor this pt.

## 2018-03-30 NOTE — H&P
[facility]  Cardiology  History and Physical     Patient Name: Levon Arizmendi  MRN: [id number]  Admission Date: 3/30/2018  Code Status: Full Code   Attending Provider: Kathryn Holley MD   Primary Care Physician: Shane Alonso MD  Principal Problem:Acute decompensated heart failure    Patient information was obtained from patient, past medical records and ER records.     Subjective:     Chief Complaint:  LE swelling     HPI:  Mr. Arizmendi is a 27yo gentleman with a past medical history of NICM (HFrEF-EF 20%, LVEDD 7.1)) s/p [place] [employer] dcICD (12/6/2017), KAMILLE, obesity, HTN, HLD who was transferred from [facility] for ADHF and cardiogenic shock. He initially had worsening LE edema for the prior few months since they discontinued his Entresto. His legs have gotten so swollen that he has severe pain in his left leg about a week ago, and progressed to the right leg a few days after. He was admitted and started on bumex IV. He developed worsening HAI and thus he was transferred to the ICU for  gtt. [place] [facility] [place].    Past Medical History:   Diagnosis Date    Cardiomyopathy     Hyperlipidemia     Hypertension     Obesity     Systolic heart failure secondary to idiopathic cardiomyopathy        History reviewed. No pertinent surgical history.    Review of patient's allergies indicates:  No Known Allergies    No current facility-administered medications on file prior to encounter.      Current Outpatient Prescriptions on File Prior to Encounter   Medication Sig    aspirin 81 MG Chew Take 81 mg by mouth once daily.    carvedilol (COREG) 12.5 MG tablet Take 2 tablets (25 mg total) by mouth 2 (two) times daily.    furosemide (LASIX) 40 MG tablet Take 2 tablets (80 mg total) by mouth 2 (two) times daily.    gabapentin (NEURONTIN) 400 MG capsule Take 400 mg by mouth 3 (three) times daily.    hydrALAZINE (APRESOLINE) 25 MG tablet Take 1 tablet (25  mg total) by mouth 3 (three) times daily.    isosorbide dinitrate (ISORDIL) 20 MG tablet Take 1 tablet (20 mg total) by mouth 3 (three) times daily.    latanoprost 0.005 % ophthalmic solution Apply 1 drop to eye every evening.    loratadine (CLARITIN) 10 mg tablet Take 10 mg by mouth once daily.    nitroGLYCERIN (NITROSTAT) 0.4 MG SL tablet DESIRE EVERY 5 MINUTES AS NEEDED FOR CHEST PAINS    pantoprazole (PROTONIX) 40 MG tablet Take 1 tablet (40 mg total) by mouth once daily. Take 1 pill twice daily for 7 days, then 1x daily thereafter    potassium chloride SA (K-DUR,KLOR-CON) 20 MEQ tablet Take 2 tablets (40 mEq total) by mouth once daily.    sacubitril-valsartan (ENTRESTO)  mg per tablet Take 1 tablet by mouth 2 (two) times daily.    spironolactone (ALDACTONE) 25 MG tablet Take 25 mg by mouth once daily.     Family History     Problem Relation (Age of Onset)    Heart disease Maternal Grandmother, Maternal Grandfather        Social History Main Topics    Smoking status: Never Smoker    Smokeless tobacco: Never Used    Alcohol use No    Drug use: No    Sexual activity: Not Currently     Review of Systems   Constitution: Negative for chills, fever and malaise/fatigue.   HENT: Negative.    Cardiovascular: Positive for leg swelling. Negative for chest pain, dyspnea on exertion, irregular heartbeat, orthopnea, palpitations and paroxysmal nocturnal dyspnea.   Respiratory: Negative for shortness of breath and wheezing.    Skin: Negative for color change and rash.   Musculoskeletal: Negative for arthritis, back pain and myalgias.   Gastrointestinal: Negative for abdominal pain, constipation, diarrhea and nausea.   Neurological: Negative for dizziness, numbness and paresthesias.   Psychiatric/Behavioral: Negative.      Objective:     Vital Signs (Most Recent):  Temp: 98.2 °F (36.8 °C) (03/30/18 0220)  Pulse: 97 (03/30/18 0220)  Resp: 16 (03/30/18 0220)  BP: 114/73 (03/30/18 0220)  SpO2: 98 % (03/30/18 0220)  Vital Signs (24h Range):  Temp:  [98.2 °F (36.8 °C)] 98.2 °F (36.8 °C)  Pulse:  [97] 97  Resp:  [16] 16  SpO2:  [98 %] 98 %  BP: (114)/(73) 114/73        There is no height or weight on file to calculate BMI.    SpO2: 98 %  O2 Device (Oxygen Therapy): room air    No intake or output data in the 24 hours ending 03/30/18 0306    Lines/Drains/Airways          No matching active lines, drains, or airways          Physical Exam   Constitutional: Vital signs are normal. He appears well-developed and well-nourished. He is cooperative.  Non-toxic appearance. No distress.   HENT:   Head: Normocephalic and atraumatic.   Neck: No hepatojugular reflux and no JVD present. Carotid bruit is not present.   Cardiovascular: Normal rate, regular rhythm, S1 normal, S2 normal and normal heart sounds.  Exam reveals no gallop.    No murmur heard.  Pulses:       Radial pulses are 2+ on the right side, and 2+ on the left side.        Dorsalis pedis pulses are 1+ on the right side, and 1+ on the left side.   Anasarca   Pulmonary/Chest: Effort normal. No respiratory distress. He has decreased breath sounds in the right lower field and the left lower field. He has no wheezes. He has no rhonchi. He has no rales.   Abdominal: Soft. Normal appearance and bowel sounds are normal. He exhibits no distension and no mass. There is no tenderness.   Neurological: He is alert.   Skin: Skin is warm, dry and intact.       Significant Labs: pending  OSH: bnp 15k, Cr 2.4 (baseline 1.5), Na 128, tbili 5.3, lactic 3.9    Significant Imaging:     RHC (09/12/2017):  D. Hemodynamic Results     RA: 23/14 (14)  RV: 70/20  PW: 40/47 (38)  PA: 68/35 (46)  PA_SAT: 60  AO: 130/83 (99)  PW_SAT: 95  AO_SAT: 97    CONDITION 1 (9/12/2017 13:50:09):  FICKCI: 1.8600  FICKCO: 4.1300  PVR: 155.0000  SVR: 1646.0000    EKG: NSR    Assessment and Plan:     * Acute decompensated heart failure    Acute on Chronic Combined Systolic and Diastolic Heart Failure  -NICM  -NYHA Class  III  -LVEF 20-25%  -Last 2D Echo:08/01/2017, LVEDD 7.1 cm  -Hypervolemic on examination today  -Current diuretic regimen: start Furosemide gtt.  -GDMT with isordil, spirinolactone. Unable to do ACE/ARB due to HAI, unable to due BB due to , no agents at present due to hypotension  -Patient is not currently being evaluated for OHTx/VAD due to body habitus  -Pathway was not initiated  -2g Na dietary restriction, 1500 mL fluid restriction, strict I/Os            Essential hypertension    --a lot of his BP meds seem to have been changed. Will titrate up isordil and hydralazine as tolerated for afterload reduction.  --BP meds at OSH only had isordil 10 and coreg 6.5 (which was discontinued mid 3/28 due to  being started late 03/28.        KAMILLE (obstructive sleep apnea)    --CPAP QHS            VTE Risk Mitigation         Ordered     apixaban tablet 5 mg  2 times daily     Route:  Oral        03/30/18 0230     Reason for No Pharmacological VTE Prophylaxis  Once      03/30/18 0229     IP VTE HIGH RISK PATIENT  Once      03/30/18 0229          Martha Escalera MD  Cardiology   [facility]

## 2018-03-30 NOTE — HPI
Mr. Arizmendi is a 29yo gentleman with a past medical history of NICM (HFrEF-EF 20%, LVEDD 7.1)) s/p [place] [employer] dcICD (12/6/2017), KAMILLE, obesity, HTN, HLD who was transferred from [facility] for ADHF and cardiogenic shock. He initially had worsening LE edema for the prior few months since they discontinued his Entresto. His legs have gotten so swollen that he has severe pain in his left leg about a week ago, and progressed to the right leg a few days after. He was admitted and started on bumex IV. He developed worsening HAI and thus he was transferred to the ICU for  gtt. He was transferred to [facility] [place] for further management of his ADHF.

## 2018-03-30 NOTE — PLAN OF CARE
Problem: Patient Care Overview  Goal: Plan of Care Review  Outcome: Ongoing (interventions implemented as appropriate)     Pt admitted from [facility] today at 1100. Plan is to diurese and control leg pain.       Resp: Room air. Sats > 98%. Equal, coarse, diminished.      CV: HR 80-90s, no PVCs, no ectopies, SBP: 90s-110s MAP > 70 from a-line. AICD on. CVP= 17. Doppler feet pulses.      Neuro: AAOx4. Afebrile. Leg pain due to edema.     Gastrointestinal: Cardiac diet. 2L FR. 1 BM today.      Genitourinary: Voids per glez 200-350 clear, yellow urine.    Skin: Dependent edema 4+ below waist. 3+ generalized upper body edema. Weeping skin on bilateral lower legs.      Lines: Righ IJ triple lumen.     Infusions: KVO @ 5, Dobutamine @ 5, and Lasix @ 30      Plan of care communicated and reviewed with Patient. All concerns addressed. Will continue to monitor.

## 2018-03-30 NOTE — ASSESSMENT & PLAN NOTE
--a lot of his BP meds seem to have been changed. Will titrate up isordil and hydralazine as tolerated for afterload reduction.  --BP meds at OSH only had isordil 10 and coreg 6.5 (which was discontinued mid 3/28 due to  being started late 03/28.

## 2018-03-30 NOTE — PROGRESS NOTES
Pt arrived to [facility] 3084 from [facility] with [facility]SU RN's at bedside to transport. Pt AAOX4 and follows all commands. Pt placed monitor upon arrival. Pt c/o 10/10 pain to right leg. Right and left leg is extremely swollen and weeping. Dr Novak [facility]

## 2018-03-31 PROBLEM — Z86.711 HISTORY OF PULMONARY EMBOLISM: Status: ACTIVE | Noted: 2018-01-01

## 2018-03-31 PROBLEM — I26.99 ACUTE PULMONARY EMBOLISM: Status: ACTIVE | Noted: 2018-01-01

## 2018-03-31 PROBLEM — N17.9 AKI (ACUTE KIDNEY INJURY): Status: ACTIVE | Noted: 2018-01-01

## 2018-03-31 NOTE — SUBJECTIVE & OBJECTIVE
Interval History: No acute issues overnight. Having mild L leg pain (unable to position comfortably due to size). Excellent UOP (-4Ls in past 24 hours). CVP improved from 24 to 16    Continuous Infusions:   DOBUTamine 5 mcg/kg/min (03/31/18 1300)    furosemide (LASIX) 2 mg/mL infusion (non-titrating) 30 mg/hr (03/31/18 1300)     Scheduled Meds:   aspirin  81 mg Oral Daily    cetirizine  10 mg Oral Daily    chlorothiazide  500 mg Oral BID    enoxaparin  40 mg Subcutaneous Daily    gabapentin  400 mg Oral TID    isosorbide dinitrate  20 mg Oral TID    latanoprost  1 drop Left Eye QHS    [START ON 4/1/2018] magnesium oxide  400 mg Oral Daily    pantoprazole  40 mg Oral Daily    potassium chloride SA  40 mEq Oral Daily    sodium chloride 0.9%  3 mL Intravenous Q8H    spironolactone  25 mg Oral Daily     PRN Meds:acetaminophen, docusate sodium, hydrocodone-acetaminophen 5-325mg    Review of patient's allergies indicates:  No Known Allergies    Objective:     Vital Signs (Most Recent):  Temp: 98 °F (36.7 °C) (03/31/18 1100)  Pulse: 98 (03/31/18 1300)  Resp: 19 (03/31/18 1300)  BP: (!) 105/58 (03/31/18 1300)  SpO2: 100 % (03/31/18 1300) Vital Signs (24h Range):  Temp:  [97.6 °F (36.4 °C)-98.4 °F (36.9 °C)] 98 °F (36.7 °C)  Pulse:  [] 98  Resp:  [15-24] 19  SpO2:  [97 %-100 %] 100 %  BP: ()/(53-83) 105/58     Patient Vitals for the past 72 hrs (Last 3 readings):   Weight   03/31/18 0410 124.3 kg (274 lb 0.5 oz)   03/30/18 1045 130.5 kg (287 lb 11.2 oz)   03/30/18 0700 125.9 kg (277 lb 9 oz)     Body mass index is 48.54 kg/m².      Intake/Output Summary (Last 24 hours) at 03/31/18 1348  Last data filed at 03/31/18 1300   Gross per 24 hour   Intake             2450 ml   Output             5090 ml   Net            -2640 ml     Hemodynamic Parameters:  CVP:  [16 mmHg-18 mmHg] 16 mmHg    Physical Exam   Constitutional: Obese male. NAD  HEENT: NC/AT. PERRL   Neck: Difficult JVD exam (CVP 16). Supple  "  Cardio: RRR no Murmurs. S3 christian   Pulm: No respiratory distress. Bibasilar crackles heard.   : + Barton  Abd: Soft and non tender. Normal BS   Neuro: No focal deficits.   MSK: Grossly volume overloaded (Anasarca)     Significant Labs:  CBC:    Recent Labs  Lab 03/30/18  0500 03/31/18  0410   WBC 17.92* 15.74*   RBC 4.04* 4.01*   HGB 10.6* 10.4*   HCT 29.3* 28.7*   * 115*   MCV 73* 72*   MCH 26.2* 25.9*   MCHC 36.2* 36.2*     BNP:    Recent Labs  Lab 03/30/18  0500   BNP 2,704*     CMP:    Recent Labs  Lab 03/30/18  0500 03/30/18  1801 03/31/18  0410 03/31/18  0735   *  135* 171* 101 91   CALCIUM 8.8  8.8 8.3* 8.5* 8.5*   ALBUMIN 2.2*  2.2*  --  2.0*  --    PROT 5.9*  5.9*  --  5.7*  --    *  130* 133* 133* 133*   K 4.3  4.3 4.2 4.0 3.8   CO2 30*  30* 30* 31* 33*   CL 93*  93* 95 94* 93*   BUN 56*  56* 49* 44* 44*   CREATININE 1.6*  1.6* 1.4 1.3 1.2   ALKPHOS 67  67  --  65  --    ALT 35  35  --  32  --    AST 43*  43*  --  43*  --    BILITOT 5.2*  5.2*  --  4.9*  --       I have reviewed all pertinent labs within the past 24 hours.    Estimated Creatinine Clearance: 108.8 mL/min (based on SCr of 1.2 mg/dL).    Diagnostic Results:  CXR: "Cardiomegaly with mild bibasilar edema.  AICD present. Right IJ catheter tip overlies the SVC."  "

## 2018-03-31 NOTE — PROGRESS NOTES
[facility]  Heart Transplant  Progress Note    Patient Name: Levon Arizmendi  MRN: [id number]  Admission Date: 3/30/2018  Hospital Length of Stay: 1 days  Attending Physician: Kathryn Holley MD  Primary Care Provider: Shane Alonso MD  Principal Problem:Acute decompensated heart failure    Subjective:     Interval History: No acute issues overnight. Having mild L leg pain (unable to position comfortably due to size). Excellent UOP (-4Ls in past 24 hours). CVP improved from 24 to 16    Continuous Infusions:   DOBUTamine 5 mcg/kg/min (03/31/18 1300)    furosemide (LASIX) 2 mg/mL infusion (non-titrating) 30 mg/hr (03/31/18 1300)     Scheduled Meds:   aspirin  81 mg Oral Daily    cetirizine  10 mg Oral Daily    chlorothiazide  500 mg Oral BID    enoxaparin  40 mg Subcutaneous Daily    gabapentin  400 mg Oral TID    isosorbide dinitrate  20 mg Oral TID    latanoprost  1 drop Left Eye QHS    [START ON 4/1/2018] magnesium oxide  400 mg Oral Daily    pantoprazole  40 mg Oral Daily    potassium chloride SA  40 mEq Oral Daily    sodium chloride 0.9%  3 mL Intravenous Q8H    spironolactone  25 mg Oral Daily     PRN Meds:acetaminophen, docusate sodium, hydrocodone-acetaminophen 5-325mg    Review of patient's allergies indicates:  No Known Allergies    Objective:     Vital Signs (Most Recent):  Temp: 98 °F (36.7 °C) (03/31/18 1100)  Pulse: 98 (03/31/18 1300)  Resp: 19 (03/31/18 1300)  BP: (!) 105/58 (03/31/18 1300)  SpO2: 100 % (03/31/18 1300) Vital Signs (24h Range):  Temp:  [97.6 °F (36.4 °C)-98.4 °F (36.9 °C)] 98 °F (36.7 °C)  Pulse:  [] 98  Resp:  [15-24] 19  SpO2:  [97 %-100 %] 100 %  BP: ()/(53-83) 105/58     Patient Vitals for the past 72 hrs (Last 3 readings):   Weight   03/31/18 0410 124.3 kg (274 lb 0.5 oz)   03/30/18 1045 130.5 kg (287 lb 11.2 oz)   03/30/18 0700 125.9 kg (277 lb 9 oz)     Body mass index is 48.54 kg/m².      Intake/Output Summary (Last 24 hours) at  "03/31/18 1348  Last data filed at 03/31/18 1300   Gross per 24 hour   Intake             2450 ml   Output             5090 ml   Net            -2640 ml     Hemodynamic Parameters:  CVP:  [16 mmHg-18 mmHg] 16 mmHg    Physical Exam   Constitutional: Obese male. NAD  HEENT: NC/AT. PERRL   Neck: Difficult JVD exam (CVP 16). Supple   Cardio: RRR no Murmurs. S3 christian   Pulm: No respiratory distress. Bibasilar crackles heard.   : + Glez  Abd: Soft and non tender. Normal BS   Neuro: No focal deficits.   MSK: Grossly volume overloaded (Anasarca)     Significant Labs:  CBC:    Recent Labs  Lab 03/30/18  0500 03/31/18  0410   WBC 17.92* 15.74*   RBC 4.04* 4.01*   HGB 10.6* 10.4*   HCT 29.3* 28.7*   * 115*   MCV 73* 72*   MCH 26.2* 25.9*   MCHC 36.2* 36.2*     BNP:    Recent Labs  Lab 03/30/18  0500   BNP 2,704*     CMP:    Recent Labs  Lab 03/30/18  0500 03/30/18  1801 03/31/18  0410 03/31/18  0735   *  135* 171* 101 91   CALCIUM 8.8  8.8 8.3* 8.5* 8.5*   ALBUMIN 2.2*  2.2*  --  2.0*  --    PROT 5.9*  5.9*  --  5.7*  --    *  130* 133* 133* 133*   K 4.3  4.3 4.2 4.0 3.8   CO2 30*  30* 30* 31* 33*   CL 93*  93* 95 94* 93*   BUN 56*  56* 49* 44* 44*   CREATININE 1.6*  1.6* 1.4 1.3 1.2   ALKPHOS 67  67  --  65  --    ALT 35  35  --  32  --    AST 43*  43*  --  43*  --    BILITOT 5.2*  5.2*  --  4.9*  --       I have reviewed all pertinent labs within the past 24 hours.    Estimated Creatinine Clearance: 108.8 mL/min (based on SCr of 1.2 mg/dL).    Diagnostic Results:  CXR: "Cardiomegaly with mild bibasilar edema.  AICD present. Right IJ catheter tip overlies the SVC."    Assessment and Plan:     No notes on file    * Acute decompensated heart failure    Wet / Warm on exam  Massively volume overloaded (worse in LE)    gtt at 5mcg/kg/min   Lasix gtt at 30/hr with diuril increased to 500mg PO BID   Net negative 5.5Ls since admission  Maintain glez catheter (amount of LE edema limits mobility " significantly) for next 24 -48 hours then remove   Continue spironolactone 25mg daily with plan to add valsartan 40mg BID tomorrow   Obtained OSH records via [facility]   Restarted Apixaban 5mg BID for recent PE as no planned invasive procedures.   Holding BB in setting of    Strict I/Os and Daily weights  PT / OT for aggressive ambulation   Needs care manager as care will be difficult going forward (per him will be evicted from home soon)         HAI (acute kidney injury)    Resolved. Suspect low flow / cardio renal   Cr improved to 1.2 with diuresis  Avoid nephrotoxic agents         History of pulmonary embolism    Diagnosed 1/11/18 at [facility] [place] [facility] ([facility])  Continue Apixaban 5mg BID             Patricio seo, DO  Heart Transplant  [facility]

## 2018-03-31 NOTE — PLAN OF CARE
Problem: Patient Care Overview  Goal: Plan of Care Review  Outcome: Ongoing (interventions implemented as appropriate)  No acute events throughout day. See vital signs and assessments in flowsheets. See below for updates on today's progress.     Pulmonary: RA with oxygen saturations > 96%. Patient refused CPAP at night.     Cardiovascular: -120s. CVP 15-16. SvO2 49. Normotensive.     Neurological: AAOX4. PERRL.    Gastrointestinal: no BM    Genitourinary: Barton in place and patent 150-175 cc/hr. ~2L for shift.    Integumentary/Other: Pitting, 4+ edema in lower extremities     Infusions: Lasix @ 30 mg/hr,  @ 5mcg/kg/hr    Patient provided with extensive education on HF and fluid removal. Patient voiced displeasure over being in hospital, does not believe anything is being done to treat his care, and says he is going home to [place] today.  Patient does is not receptive to education.    Patient progressing towards goals as tolerated, plan of care communicated and reviewed with Levon Arizmendi. All concerns addressed. Will continue to monitor.

## 2018-03-31 NOTE — ASSESSMENT & PLAN NOTE
Resolved. Suspect low flow / cardio renal   Cr improved to 1.2 with diuresis  Avoid nephrotoxic agents

## 2018-03-31 NOTE — NURSING
"Patient complains of 10/10 pain without changes in vital signs. Refused PRN tylenol and PM gabapentin. Sp ELIZABETH notified and ordered 1x dose of Morphine. Patient refused morphine, restless, and is asking to have "leg cut off. I cant live like this. Put a tube in my leg and drain this fluid". MD aware.   "

## 2018-03-31 NOTE — ASSESSMENT & PLAN NOTE
Wet / Warm on exam  Massively volume overloaded (worse in LE)    gtt at 5mcg/kg/min   Lasix gtt at 30/hr with diuril increased to 500mg PO BID   Net negative 5.5Ls since admission  Maintain glez catheter (amount of LE edema limits mobility significantly) for next 24 -48 hours then remove   Continue spironolactone 25mg daily with plan to add valsartan 40mg BID tomorrow   Obtained OSH records via [facility]   Restarted Apixaban 5mg BID for recent PE as no planned invasive procedures.   Holding BB in setting of    Strict I/Os and Daily weights  PT / OT for aggressive ambulation   Needs care manager as care will be difficult going forward (per him will be evicted from home soon)

## 2018-03-31 NOTE — ASSESSMENT & PLAN NOTE
Diagnosed 1/11/18 at [facility] ([facility])  Continue Apixaban 5mg BID

## 2018-03-31 NOTE — PLAN OF CARE
Problem: Patient Care Overview  Goal: Plan of Care Review  Outcome: Ongoing (interventions implemented as appropriate)  No acute events throughout day. See vital signs and assessments in flowsheets. See below for updates on today's progress.     Pulmonary: Patient is on room air with oxygen saturation of 100%.     Cardiovascular: Sinus Tachycardia, with heart rate ranging between 101-115bpm    Neurological: Oriented X 4    Gastrointestinal: On cardiac diet, with 2000ml fluid restriction - education on fluid restriction and importance of diureses provided multiple times throughout the shift.    Genitourinary: glez catheter in place voiding 150-350ml per hour, with Lasix infusion at rate 30mg/hour    Integumentary/Other:  severe edema in bilateral lower extremities, his right leg is slightly oozing. Encouraged position change but patient always refused (unable to assess the skin condition on his back)    Infusions: On Dobutamine 5mcg/kg/min, Furosemide 30mg/hour and KVO via trialysis at [facility]      Patient needs  referral and needs continuous diuretics as per provider  Patient progressing towards goals as tolerated, plan of care communicated and reviewed with Levon Arizmendi.  All concerns addressed. Will continue to monitor.

## 2018-03-31 NOTE — NURSING
Patient on 30mg/hr of Lasix at shift change. Order written for 20mg/hr. Confirmed with Sp ELIZABETH that Lasix is to be at 30mg/hr. Order modified appropriately.

## 2018-04-01 PROBLEM — N17.9 AKI (ACUTE KIDNEY INJURY): Status: RESOLVED | Noted: 2018-01-01 | Resolved: 2018-01-01

## 2018-04-01 NOTE — ASSESSMENT & PLAN NOTE
Remains Wet / Warm on exam   gtt at 5mcg/kg/min   Lasix gtt at 30/hr with diuril 500mg PO BID   Net negative 9Ls since admission.   Strict I/Os and Daily weights  Maintain glez catheter for next 24 -48 hours then remove   Continue spironolactone 25mg daily  Valsartan 40mg BID added this AM  Holding BB in setting of  gtt   PT / OT consulted for aggressive ambulation   Currently not planning for any invasive procedures so will restart apixaban for recent PE

## 2018-04-01 NOTE — PLAN OF CARE
Problem: Patient Care Overview  Goal: Plan of Care Review  Outcome: Ongoing (interventions implemented as appropriate)  No acute events throughout day. See vital signs and assessments in flowsheets. See below for updates on today's progress.      Pulmonary: RA with oxygen saturations > 96%. Patient worse CPAP for a few hours before refusing it.      Cardiovascular: -120s. CVP 13-15. SvO2 48. Normotensive.      Neurological: AAOX4. PERRL.     Gastrointestinal: no BM     Genitourinary: Barton in place and patent 300-350 cc/hr.      Integumentary/Other: Pitting, 4+ edema in lower extremities      Infusions: Lasix @ 30 mg/hr,  @ 5mcg/kg/hr     Patient continues to display a lack of understanding towards his disease process and treatment plan and continues to refuse aspects of treatments as noted in previous notes.     Patient progressing towards goals as tolerated, plan of care communicated and reviewed with Levon Arizmendi. All concerns addressed. Will continue to monitor.

## 2018-04-01 NOTE — NURSING
Patient refusing glez care, to lie flat for CVP, and compliance with fluid restriction at this time. Education provided about the importance of compliance with plan of care. Continues to refuse these actions.

## 2018-04-01 NOTE — PROGRESS NOTES
[facility]  Heart Transplant  Progress Note    Patient Name: Levon Arizmendi  MRN: [id number]  Admission Date: 3/30/2018  Hospital Length of Stay: 2 days  Attending Physician: Kathryn Holley MD  Primary Care Provider: Shane Alonso MD  Principal Problem:Acute decompensated heart failure    Subjective:     Interval History: No acute issues overnight. Eating and drinking okay. UOP remains excellent (negative 4Ls in past 24 hours). All questions answered.    Continuous Infusions:   DOBUTamine 5 mcg/kg/min (04/01/18 1000)    furosemide (LASIX) 2 mg/mL infusion (non-titrating) 30 mg/hr (04/01/18 1130)     Scheduled Meds:   apixaban  5 mg Oral BID    aspirin  81 mg Oral Daily    cetirizine  10 mg Oral Daily    chlorothiazide  500 mg Oral BID    gabapentin  400 mg Oral TID    isosorbide dinitrate  20 mg Oral TID    latanoprost  1 drop Left Eye QHS    magnesium oxide  800 mg Oral BID    magnesium oxide  800 mg Oral Once    pantoprazole  40 mg Oral Daily    potassium chloride SA  40 mEq Oral Daily    sodium chloride 0.9%  3 mL Intravenous Q8H    spironolactone  25 mg Oral Daily    valsartan  40 mg Oral BID     PRN Meds:acetaminophen, docusate sodium, hydrocodone-acetaminophen 5-325mg    Review of patient's allergies indicates:  No Known Allergies    Objective:     Vital Signs (Most Recent):  Temp: 98.1 °F (36.7 °C) (04/01/18 0730)  Pulse: (!) 117 (04/01/18 1115)  Resp: (!) 29 (04/01/18 1115)  BP: 98/63 (04/01/18 1115)  SpO2: 99 % (04/01/18 1115) Vital Signs (24h Range):  Temp:  [97.8 °F (36.6 °C)-98.6 °F (37 °C)] 98.1 °F (36.7 °C)  Pulse:  [102-128] 117  Resp:  [15-31] 29  SpO2:  [98 %-100 %] 99 %  BP: ()/(54-85) 98/63     Patient Vitals for the past 72 hrs (Last 3 readings):   Weight   04/01/18 0500 118.4 kg (261 lb 0.4 oz)   03/31/18 0410 124.3 kg (274 lb 0.5 oz)   03/30/18 1045 130.5 kg (287 lb 11.2 oz)     Body mass index is 46.24 kg/m².    Intake/Output Summary (Last 24  "hours) at 04/01/18 1343  Last data filed at 04/01/18 1000   Gross per 24 hour   Intake           2619.5 ml   Output             6425 ml   Net          -3805.5 ml     Hemodynamic Parameters:  CVP:  [13 mmHg-15 mmHg] 15 mmHg    Physical Exam  Constitutional: Obese male. NAD  HEENT: NC/AT. PERRL   Neck: Difficult JVD exam (CVP 13). Supple   Cardio: RRR no Murmurs. S3 christian   Pulm: No respiratory distress. Bibasilar crackles heard.   : + Barton  Abd: Soft and non tender. Normal BS   Neuro: No focal deficits. AAOx3   MSK: Grossly volume overloaded (Anasarca)     Significant Labs:  CBC:    Recent Labs  Lab 03/30/18  0500 03/31/18  0410 04/01/18  0328   WBC 17.92* 15.74* 16.68*   RBC 4.04* 4.01* 4.05*   HGB 10.6* 10.4* 10.2*   HCT 29.3* 28.7* 28.3*   * 115* 113*   MCV 73* 72* 70*   MCH 26.2* 25.9* 25.2*   MCHC 36.2* 36.2* 36.0     BNP:    Recent Labs  Lab 03/30/18  0500   BNP 2,704*     CMP:    Recent Labs  Lab 03/30/18  0500  03/31/18  0410  03/31/18  1813 04/01/18  0328 04/01/18  0917   *  135*  < > 101  < > 114* 86 129*   CALCIUM 8.8  8.8  < > 8.5*  < > 8.3* 8.3* 8.5*   ALBUMIN 2.2*  2.2*  --  2.0*  --   --  1.9*  --    PROT 5.9*  5.9*  --  5.7*  --   --  5.9*  --    *  130*  < > 133*  < > 135* 136 136   K 4.3  4.3  < > 4.0  < > 3.7 3.9 3.8   CO2 30*  30*  < > 31*  < > 36* 34* 36*   CL 93*  93*  < > 94*  < > 93* 92* 91*   BUN 56*  56*  < > 44*  < > 36* 30* 28*   CREATININE 1.6*  1.6*  < > 1.3  < > 1.2 1.0 1.0   ALKPHOS 67  67  --  65  --   --  85  --    ALT 35  35  --  32  --   --  30  --    AST 43*  43*  --  43*  --   --  39  --    BILITOT 5.2*  5.2*  --  4.9*  --   --  4.9*  --    < > = values in this interval not displayed.     Estimated Creatinine Clearance: 126.8 mL/min (based on SCr of 1 mg/dL).    Diagnostic Results:  CXR: "Cardiomegaly with mild bibasilar edema.  AICD present. Right IJ catheter tip overlies the SVC."    Assessment and Plan:     No notes on file    * Acute " decompensated heart failure    Remains Wet / Warm on exam   gtt at 5mcg/kg/min   Lasix gtt at 30/hr with diuril 500mg PO BID   Net negative 9Ls since admission.   Strict I/Os and Daily weights  Maintain glez catheter for next 24 -48 hours then remove   Continue spironolactone 25mg daily  Valsartan 40mg BID added this AM  Holding BB in setting of  gtt   PT / OT consulted for aggressive ambulation   Currently not planning for any invasive procedures so will restart apixaban for recent PE        History of pulmonary embolism    Diagnosed 1/11/18 at [facility] [place] [facility]  Continue Apixaban 5mg BID   Repeat CT PE study (done before transfer) negative            Patricio seo DO  Heart Transplant  [facility]

## 2018-04-01 NOTE — PLAN OF CARE
Problem: Patient Care Overview  Goal: Plan of Care Review  Outcome: Ongoing (interventions implemented as appropriate)  No acute events throughout day. See vital signs and assessments in flowsheets. See below for updates on today's progress.     Pulmonary: Room air with oxygen saturation %    Cardiovascular: Sinus tachy, rate between 105-120. CVP ranges 13-15.    Neurological: Awake, alert and coherent. Oriented X 4    Gastrointestinal: No Bm. On cardiac diet with fluid restriction of 2000 cc    Genitourinary: With glez catheter, with urine output of 225-350cc per hour    Integumentary/Other: Severe edema on bilateral lower extremities.Left leg is slightly oozing.    Infusions: Dobutamine at 5mcg/kg/min, Lasix at 30mg/hr.     Patient progressing towards goals as tolerated, plan of care communicated and reviewed with Levon Arizmendi and family. All concerns addressed. Will continue to monitor.

## 2018-04-01 NOTE — ASSESSMENT & PLAN NOTE
Diagnosed 1/11/18 at [facility] [place] ([facility])  Continue Apixaban 5mg BID   Repeat CT PE study (done before transfer) negative

## 2018-04-01 NOTE — NURSING
"Taylor ELIZABETH notified of patient's K 3.9/Mg 1.5. 1x dose of Magnesium oxide 800 PO ordered for patient. Patient refusing to take PO medication until his fluid restriction is lifted/increased and we start "decreasing the swelling in" patient's lower extremities. Patient exhaustively educated on the importance of fluid restrictions in heart failure, electrolyte replacement in the setting of aggressive diuresis, and compliance with the recommendations of physicians. Patient continues to refuse PO medications. Taylor ELIZABETH notified.   "

## 2018-04-01 NOTE — SUBJECTIVE & OBJECTIVE
Interval History: No acute issues overnight. Eating and drinking okay. UOP remains excellent (negative 4Ls in past 24 hours). All questions answered.    Continuous Infusions:   DOBUTamine 5 mcg/kg/min (04/01/18 1000)    furosemide (LASIX) 2 mg/mL infusion (non-titrating) 30 mg/hr (04/01/18 1130)     Scheduled Meds:   apixaban  5 mg Oral BID    aspirin  81 mg Oral Daily    cetirizine  10 mg Oral Daily    chlorothiazide  500 mg Oral BID    gabapentin  400 mg Oral TID    isosorbide dinitrate  20 mg Oral TID    latanoprost  1 drop Left Eye QHS    magnesium oxide  800 mg Oral BID    magnesium oxide  800 mg Oral Once    pantoprazole  40 mg Oral Daily    potassium chloride SA  40 mEq Oral Daily    sodium chloride 0.9%  3 mL Intravenous Q8H    spironolactone  25 mg Oral Daily    valsartan  40 mg Oral BID     PRN Meds:acetaminophen, docusate sodium, hydrocodone-acetaminophen 5-325mg    Review of patient's allergies indicates:  No Known Allergies    Objective:     Vital Signs (Most Recent):  Temp: 98.1 °F (36.7 °C) (04/01/18 0730)  Pulse: (!) 117 (04/01/18 1115)  Resp: (!) 29 (04/01/18 1115)  BP: 98/63 (04/01/18 1115)  SpO2: 99 % (04/01/18 1115) Vital Signs (24h Range):  Temp:  [97.8 °F (36.6 °C)-98.6 °F (37 °C)] 98.1 °F (36.7 °C)  Pulse:  [102-128] 117  Resp:  [15-31] 29  SpO2:  [98 %-100 %] 99 %  BP: ()/(54-85) 98/63     Patient Vitals for the past 72 hrs (Last 3 readings):   Weight   04/01/18 0500 118.4 kg (261 lb 0.4 oz)   03/31/18 0410 124.3 kg (274 lb 0.5 oz)   03/30/18 1045 130.5 kg (287 lb 11.2 oz)     Body mass index is 46.24 kg/m².    Intake/Output Summary (Last 24 hours) at 04/01/18 1343  Last data filed at 04/01/18 1000   Gross per 24 hour   Intake           2619.5 ml   Output             6425 ml   Net          -3805.5 ml     Hemodynamic Parameters:  CVP:  [13 mmHg-15 mmHg] 15 mmHg    Physical Exam  Constitutional: Obese male. NAD  HEENT: NC/AT. PERRL   Neck: Difficult JVD exam (CVP 13).  "Supple   Cardio: RRR no Murmurs. S3 christian   Pulm: No respiratory distress. Bibasilar crackles heard.   : + Barton  Abd: Soft and non tender. Normal BS   Neuro: No focal deficits. AAOx3   MSK: Grossly volume overloaded (Anasarca)     Significant Labs:  CBC:    Recent Labs  Lab 03/30/18  0500 03/31/18  0410 04/01/18  0328   WBC 17.92* 15.74* 16.68*   RBC 4.04* 4.01* 4.05*   HGB 10.6* 10.4* 10.2*   HCT 29.3* 28.7* 28.3*   * 115* 113*   MCV 73* 72* 70*   MCH 26.2* 25.9* 25.2*   MCHC 36.2* 36.2* 36.0     BNP:    Recent Labs  Lab 03/30/18  0500   BNP 2,704*     CMP:    Recent Labs  Lab 03/30/18  0500  03/31/18  0410  03/31/18  1813 04/01/18  0328 04/01/18  0917   *  135*  < > 101  < > 114* 86 129*   CALCIUM 8.8  8.8  < > 8.5*  < > 8.3* 8.3* 8.5*   ALBUMIN 2.2*  2.2*  --  2.0*  --   --  1.9*  --    PROT 5.9*  5.9*  --  5.7*  --   --  5.9*  --    *  130*  < > 133*  < > 135* 136 136   K 4.3  4.3  < > 4.0  < > 3.7 3.9 3.8   CO2 30*  30*  < > 31*  < > 36* 34* 36*   CL 93*  93*  < > 94*  < > 93* 92* 91*   BUN 56*  56*  < > 44*  < > 36* 30* 28*   CREATININE 1.6*  1.6*  < > 1.3  < > 1.2 1.0 1.0   ALKPHOS 67  67  --  65  --   --  85  --    ALT 35  35  --  32  --   --  30  --    AST 43*  43*  --  43*  --   --  39  --    BILITOT 5.2*  5.2*  --  4.9*  --   --  4.9*  --    < > = values in this interval not displayed.     Estimated Creatinine Clearance: 126.8 mL/min (based on SCr of 1 mg/dL).    Diagnostic Results:  CXR: "Cardiomegaly with mild bibasilar edema.  AICD present. Right IJ catheter tip overlies the SVC."  "

## 2018-04-02 PROBLEM — M25.572 ACUTE LEFT ANKLE PAIN: Status: ACTIVE | Noted: 2018-01-01

## 2018-04-02 NOTE — PLAN OF CARE
Problem: Patient Care Overview  Goal: Plan of Care Review  Outcome: Outcome(s) achieved Date Met: 04/02/18  JAMIE. Pt thoroughly educated about fluid restriction and fluid volume status and about adherence to fluid restriction. See flowsheets for vital signs and assessments. See below for updates on progress made.       Neuro: AAOx4, slept 6+ hours    Cardiovascular: ST in 100s; CVPs 11-12, SVO2 48       Pulmonary: RA    Gastrointestinal: no BM, good appetite    Genitourinary: voided 5.5L per glez for a total of 8.4L over past 24 hours and net negative 4.45L for 24 hours and 14.3L since admit    Endocrine: K 4    Integumentary/other: +4 edema BLE with mild weeping in LLE    Infusions: dobutamine at 5, lasix at 30    POC: diurese     Patient progressing towards goals as tolerated, plan of care communicated and reviewed with Levon Arizmendi. All concerns addressed. Will continue to monitor.

## 2018-04-02 NOTE — NURSING
Per Dr Cheney, pt is not currently a candidate for advanced options due to social/cargiving issues and BMI. He will follow with HF section upon discharge.   preht file closed.

## 2018-04-02 NOTE — SUBJECTIVE & OBJECTIVE
Interval History: No acute issues overnight.     Continuous Infusions:   DOBUTamine 5 mcg/kg/min (04/02/18 0940)    furosemide (LASIX) 2 mg/mL infusion (non-titrating) 30 mg/hr (04/02/18 0622)     Scheduled Meds:   apixaban  5 mg Oral BID    aspirin  81 mg Oral Daily    chlorothiazide  500 mg Oral BID    gabapentin  400 mg Oral TID    isosorbide dinitrate  20 mg Oral TID    latanoprost  1 drop Left Eye QHS    magnesium oxide  800 mg Oral BID    magnesium oxide  800 mg Oral Once    pantoprazole  40 mg Oral Daily    potassium chloride SA  40 mEq Oral BID    sodium chloride 0.9%  3 mL Intravenous Q8H    spironolactone  25 mg Oral Daily    valsartan  40 mg Oral BID     PRN Meds:acetaminophen, docusate sodium, hydrocodone-acetaminophen 5-325mg    Review of patient's allergies indicates:  No Known Allergies    Objective:     Vital Signs (Most Recent):  Temp: 98.3 °F (36.8 °C) (04/02/18 0400)  Pulse: (!) 121 (04/02/18 0826)  Resp: (!) 21 (04/02/18 0826)  BP: 108/63 (04/02/18 0600)  SpO2: 99 % (04/02/18 0826) Vital Signs (24h Range):  Temp:  [97.9 °F (36.6 °C)-98.4 °F (36.9 °C)] 98.3 °F (36.8 °C)  Pulse:  [101-122] 121  Resp:  [12-29] 21  SpO2:  [94 %-100 %] 99 %  BP: ()/(49-63) 108/63     Patient Vitals for the past 72 hrs (Last 3 readings):   Weight   04/02/18 0500 113.4 kg (250 lb)   04/01/18 0500 118.4 kg (261 lb 0.4 oz)   03/31/18 0410 124.3 kg (274 lb 0.5 oz)     Body mass index is 44.29 kg/m².    Intake/Output Summary (Last 24 hours) at 04/02/18 0951  Last data filed at 04/02/18 0600   Gross per 24 hour   Intake          2369.52 ml   Output             7800 ml   Net         -5430.48 ml     Hemodynamic Parameters:  CVP:  [11 mmHg-13 mmHg] 12 mmHg    Physical Exam   Constitutional: He is oriented to person, place, and time. He appears well-developed and well-nourished.   HENT:   Head: Normocephalic.   Eyes: Pupils are equal, round, and reactive to light.   Neck: Normal range of motion. Neck supple.  "  Cardiovascular: Normal rate and regular rhythm.    Murmur heard.  Pulmonary/Chest: Effort normal and breath sounds normal.   Abdominal: Soft. Bowel sounds are normal.   Musculoskeletal: Normal range of motion. He exhibits edema (2+ BLE edema ).   Neurological: He is alert and oriented to person, place, and time.   Skin: Skin is warm and dry.   Psychiatric: He has a normal mood and affect. His behavior is normal.   Nursing note and vitals reviewed.      Significant Labs:  CBC:    Recent Labs  Lab 03/31/18 0410 04/01/18 0328 04/02/18  0435   WBC 15.74* 16.68* 16.35*   RBC 4.01* 4.05* 4.06*   HGB 10.4* 10.2* 10.3*   HCT 28.7* 28.3* 27.9*   * 113* 116*   MCV 72* 70* 69*   MCH 25.9* 25.2* 25.4*   MCHC 36.2* 36.0 36.9*     BNP:    Recent Labs  Lab 03/30/18  0500   BNP 2,704*     CMP:    Recent Labs  Lab 03/31/18  0410  04/01/18 0328  04/01/18  1759 04/02/18  0435 04/02/18  0759     < > 86  < > 126* 94 84   CALCIUM 8.5*  < > 8.3*  < > 8.6* 8.7 8.5*   ALBUMIN 2.0*  --  1.9*  --   --  1.9*  --    PROT 5.7*  --  5.9*  --   --  5.9*  --    *  < > 136  < > 136 135* 135*   K 4.0  < > 3.9  < > 3.9 4.0 3.8   CO2 31*  < > 34*  < > 36* 35* 37*   CL 94*  < > 92*  < > 92* 90* 90*   BUN 44*  < > 30*  < > 27* 25* 24*   CREATININE 1.3  < > 1.0  < > 1.0 0.9 0.8   ALKPHOS 65  --  85  --   --  76  --    ALT 32  --  30  --   --  29  --    AST 43*  --  39  --   --  36  --    BILITOT 4.9*  --  4.9*  --   --  5.3*  --    < > = values in this interval not displayed.     Estimated Creatinine Clearance: 154.6 mL/min (based on SCr of 0.8 mg/dL).    Diagnostic Results:  CXR: "Cardiomegaly with mild bibasilar edema.  AICD present. Right IJ catheter tip overlies the SVC."  "

## 2018-04-02 NOTE — PLAN OF CARE
Problem: Physical Therapy Goal  Goal: Physical Therapy Goal  Goals to be met by: 2018    Patient will increase functional independence with mobility by performin. Supine to sit with MInimal Assistance - not met  2. Sit to supine with MInimal Assistance - not met  3. Sit to stand transfer with Minimal Assistance with LRAD - not met  4. Bed to chair transfer with Minimal Assistance using LRAD - not met  5. Gait  x 50 feet with Minimal Assistance using LRAD - not met  6. Lower extremity exercise program x15 reps per handout, with supervision - not met    Outcome: Ongoing (interventions implemented as appropriate)  Eval completed and goals appropriate.

## 2018-04-02 NOTE — PROGRESS NOTES
[facility]  Heart Transplant  Progress Note    Patient Name: Levon Arizmendi  MRN: [id number]  Admission Date: 3/30/2018  Hospital Length of Stay: 3 days  Attending Physician: Jose J Cheney MD  Primary Care Provider: Shane Alonso MD  Principal Problem:Acute decompensated heart failure    Subjective:     Interval History: No acute issues overnight.     Continuous Infusions:   DOBUTamine 5 mcg/kg/min (04/02/18 0940)    furosemide (LASIX) 2 mg/mL infusion (non-titrating) 30 mg/hr (04/02/18 0622)     Scheduled Meds:   apixaban  5 mg Oral BID    aspirin  81 mg Oral Daily    chlorothiazide  500 mg Oral BID    gabapentin  400 mg Oral TID    isosorbide dinitrate  20 mg Oral TID    latanoprost  1 drop Left Eye QHS    magnesium oxide  800 mg Oral BID    magnesium oxide  800 mg Oral Once    pantoprazole  40 mg Oral Daily    potassium chloride SA  40 mEq Oral BID    sodium chloride 0.9%  3 mL Intravenous Q8H    spironolactone  25 mg Oral Daily    valsartan  40 mg Oral BID     PRN Meds:acetaminophen, docusate sodium, hydrocodone-acetaminophen 5-325mg    Review of patient's allergies indicates:  No Known Allergies    Objective:     Vital Signs (Most Recent):  Temp: 98.3 °F (36.8 °C) (04/02/18 0400)  Pulse: (!) 121 (04/02/18 0826)  Resp: (!) 21 (04/02/18 0826)  BP: 108/63 (04/02/18 0600)  SpO2: 99 % (04/02/18 0826) Vital Signs (24h Range):  Temp:  [97.9 °F (36.6 °C)-98.4 °F (36.9 °C)] 98.3 °F (36.8 °C)  Pulse:  [101-122] 121  Resp:  [12-29] 21  SpO2:  [94 %-100 %] 99 %  BP: ()/(49-63) 108/63     Patient Vitals for the past 72 hrs (Last 3 readings):   Weight   04/02/18 0500 113.4 kg (250 lb)   04/01/18 0500 118.4 kg (261 lb 0.4 oz)   03/31/18 0410 124.3 kg (274 lb 0.5 oz)     Body mass index is 44.29 kg/m².    Intake/Output Summary (Last 24 hours) at 04/02/18 0951  Last data filed at 04/02/18 0600   Gross per 24 hour   Intake          2369.52 ml   Output             7800 ml   Net  "        -5430.48 ml     Hemodynamic Parameters:  CVP:  [11 mmHg-13 mmHg] 12 mmHg    Physical Exam   Constitutional: He is oriented to person, place, and time. He appears well-developed and well-nourished.   HENT:   Head: Normocephalic.   Eyes: Pupils are equal, round, and reactive to light.   Neck: Normal range of motion. Neck supple.   Cardiovascular: Normal rate and regular rhythm.    Murmur heard.  Pulmonary/Chest: Effort normal and breath sounds normal.   Abdominal: Soft. Bowel sounds are normal.   Musculoskeletal: Normal range of motion. He exhibits edema (2+ BLE edema ).   Neurological: He is alert and oriented to person, place, and time.   Skin: Skin is warm and dry.   Psychiatric: He has a normal mood and affect. His behavior is normal.   Nursing note and vitals reviewed.      Significant Labs:  CBC:    Recent Labs  Lab 03/31/18  0410 04/01/18  0328 04/02/18  0435   WBC 15.74* 16.68* 16.35*   RBC 4.01* 4.05* 4.06*   HGB 10.4* 10.2* 10.3*   HCT 28.7* 28.3* 27.9*   * 113* 116*   MCV 72* 70* 69*   MCH 25.9* 25.2* 25.4*   MCHC 36.2* 36.0 36.9*     BNP:    Recent Labs  Lab 03/30/18  0500   BNP 2,704*     CMP:    Recent Labs  Lab 03/31/18  0410  04/01/18  0328  04/01/18  1759 04/02/18  0435 04/02/18  0759     < > 86  < > 126* 94 84   CALCIUM 8.5*  < > 8.3*  < > 8.6* 8.7 8.5*   ALBUMIN 2.0*  --  1.9*  --   --  1.9*  --    PROT 5.7*  --  5.9*  --   --  5.9*  --    *  < > 136  < > 136 135* 135*   K 4.0  < > 3.9  < > 3.9 4.0 3.8   CO2 31*  < > 34*  < > 36* 35* 37*   CL 94*  < > 92*  < > 92* 90* 90*   BUN 44*  < > 30*  < > 27* 25* 24*   CREATININE 1.3  < > 1.0  < > 1.0 0.9 0.8   ALKPHOS 65  --  85  --   --  76  --    ALT 32  --  30  --   --  29  --    AST 43*  --  39  --   --  36  --    BILITOT 4.9*  --  4.9*  --   --  5.3*  --    < > = values in this interval not displayed.     Estimated Creatinine Clearance: 154.6 mL/min (based on SCr of 0.8 mg/dL).    Diagnostic Results:  CXR: "Cardiomegaly with " "mild bibasilar edema.  AICD present. Right IJ catheter tip overlies the SVC."    Assessment and Plan:     No notes on file    * Acute decompensated heart failure    -Continue  gtt at 5mcg/kg/min   -Diuresing well. Continue Lasix gtt at 30/hr with diuril 500mg PO BID   -Strict I/Os and Daily weights  -Maintain glez catheter for next 24 -48 hours then remove   -Continue spironolactone 25mg daily, Valsartan 40mg BID. Will up titrate as tolerated.  -Holding BB in setting of  gtt   -PT / OT consulted for aggressive ambulation   -Currently not planning for any invasive procedures so will restart apixaban for recent PE  -Not likely candidate for advanced options due to poor social situation/support.         Acute left ankle pain    - Pt states he dropped dresser on his leg at home.   - Reports OSH was going to place cast?  - Will get imaging        History of pulmonary embolism    -[place] [facility]  -Continue Apixaban 5mg BID   -Repeat CT PE study (done before transfer) negative        Critical Care Time: 30 minutes     Critical care was time spent personally by me on the following activities: development of treatment plan with patient or surrogate and bedside caregivers, discussions with consultants, evaluation of patient's response to treatment, examination of patient, ordering and performing treatments and interventions, ordering and review of laboratory studies, ordering and review of radiographic studies, pulse oximetry, re-evaluation of patient's condition. This critical care time did not overlap with that of any other provider or involve time for any procedures.        Micha Guerin NP  Heart Transplant  [facility]  "

## 2018-04-02 NOTE — PROGRESS NOTES
Admit Note     Met with patient to assess needs. Patient is a 28 y.o. single male, admitted for for heart failure.  Pt not the best historian, obtaining information was difficult.    Patient admitted from an outside hospital on 3/30/2018 .  At this time, patient presents as alert and oriented x 4 and upset. Pt was tearful at times.  At this time, patients caregiver presents as not present.    Pt states he was transferred by ambulance and that [facility] hospital and/or the ambulance lost his belongings, which included his license/ID, SS card, birth certificate, bank cards as well as some clothing articles. Pt has contacted both [facility] and the ambulance company and neither have found his belongings. Pt very upset about this, support provided.    Household/Family Systems     Pt's living situation is complicated. Pt had been living alone in an apt complex:    Pt cell: [phone]    [facility]  [address], [place]  Ph: [phone]    Pt was kicked out of his apt, he stated the rent increased and he couldn't afford it. Pt has been staying in motels with his cousin for the past few weeks. Pt stated the apt  called him from an unknown number and stated they wanted his stuff out by March 31st or they would put it out. Pt stated he has no one that can get his stuff and wanted to leave the hospital today to go get stuff. Pt also stated he pulled a dresser down on him when trying to move stuff and has injured his knee and ankle. Pt has crutches for this injury at his apt. ERICH called the apt complex and left a message asking them to call SW back, SW will ask if there is anything they can do to help pt. Pt states he has no clothes or anything.    The address listed below and in EPIC is pt's aunt, however, she is not involved in his care and they don't speak often.     [address] [address].      Support system appears extremely limited. Pt has 2 brothers and 1 sister, a 3rd brother is  incarcerated. Pt's parents are . Pt stated he stayed briefly with his sister and then she asked him to leave and that he had the same experience with a cousin.  Patient does not have dependents that are need of being cared for.     Pt stated he does have another aunt:  Travis Angel, ph: [phone] and that he might be able to stay with her, pt gave SW permission to contact his aunt, SW will call her.     Pt has been his own primary caregiver.  Confirmed patients contact information is [phone] (home);   Telephone Information:   Mobile [phone]       During admission, patient's caregiver plans to stay at home.  Confirmed patient and patients caregivers do not have access to reliable transportation. Pt uses Medicaid transport to get around.    Cognitive Status/Learning     Patient reports reading ability as 12th grade and states patient does not have difficulty with learning. Pt states he was in special education and has a tongue tie making his speech difficult to understand.  Patient reports patient learns best by hands on learning.   Needed: No.   Highest education level: High School (9-12) or GED    Vocation/Disability   .  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    Adherence     Patient reports a high level of adherence to patients health care regimen.  Adherence counseling and education provided. Patient verbalizes understanding.    Substance Use    Patient reports the following substance usage.    Tobacco: none, patient denies any use.  Alcohol: none, patient denies any use.  Illicit Drugs/Non-prescribed Medications: none, patient denies any use.  Patient states clear understanding of the potential impact of substance use.  Substance abstinence/cessation counseling, education and resources provided and reviewed.     Services Utilizing/ADLS    Infusion Service:  Prior to admission, patient utilizing? no  Home Health: Prior to admission, patient utilizing? no, pt stated [facility] was supposed to send out a sitter, but never did. SW unsure if this is accurate as [facility] is a  agency to  knowledge.  DME: Prior to admission, crutches  Pulmonary/Cardiac Rehab: Prior to admission, no  Dialysis:  Prior to admission, no  Transplant Specialty Pharmacy:  Prior to admission, no.    Prior to admission, patient reports patient was independent with ADLS and was not driving.  Patient reports patient is not able to care for self at this time due to compromised medical condition (as documented in medical record) and physical weakness..  Patient indicates a willingness to care for self once medically cleared to do so.    Insurance/Medications    Insured by   Payor/Plan Subscr  Sex Relation Sub. Ins. ID Effective Group Num   1. MEDICAID - [place]* ANTONIO WRIGHT 1990 Male  [id number]                                    [address]      Primary Insurance (for UNOS reporting): Public Insurance - Medicaid  Secondary Insurance (for UNOS reporting): None    Patient reports patient is able to obtain and afford medications at this time and at time of discharge.    Living Will/Healthcare Power of     Patient states patient does not have a LW and/or HCPA.   provided education regarding LW and HCPA and the completion of forms.    Coping/Mental Health    Patient is not coping well. Pt tearful and states he has no family or social support.  Patient denies mental health difficulties. Pt reports no history of mental illness or taking psychiatric medications.    Discharge Planning    At time of discharge, patient plans to return to unknown under the care of unknown.  Medicaid transport will transport patient, unknown where pt will d/c to.  Per rounds today, expected discharge date has not been medically determined at this time. Patient and patients caregiver   verbalize understanding and are involved in treatment planning and discharge process.    Additional Concerns    Patient is being followed for needs, education, resources, information, emotional support, supportive counseling, and for supportive and skilled discharge plan of care.  providing ongoing psychosocial support, education, resources and d/c planning as needed.  SW remains available.  remains available.

## 2018-04-02 NOTE — ASSESSMENT & PLAN NOTE
-Continue  gtt at 5mcg/kg/min   -Diuresing well. Continue Lasix gtt at 30/hr with diuril 500mg PO BID   -Strict I/Os and Daily weights  -Maintain glez catheter for next 24 -48 hours then remove   -Continue spironolactone 25mg daily, Valsartan 40mg BID. Will up titrate as tolerated.  -Holding BB in setting of  gtt   -PT / OT consulted for aggressive ambulation   -Currently not planning for any invasive procedures so will restart apixaban for recent PE  -Not likely candidate for advanced options due to poor social situation/support.

## 2018-04-02 NOTE — ASSESSMENT & PLAN NOTE
-Diagnosed 1/11/18 at [facility] ([facility]  -Continue Apixaban 5mg BID   -Repeat CT PE study (done before transfer) negative

## 2018-04-02 NOTE — ASSESSMENT & PLAN NOTE
- Pt states he dropped dresser on his leg at home.   - Reports OSH was going to place cast?  - Will get imaging

## 2018-04-02 NOTE — PT/OT/SLP EVAL
Physical Therapy Evaluation    Patient Name:  Levon Arizmendi   MRN:  [id number]    Recommendations:     Discharge Recommendations:  IP rehab   Discharge Equipment Recommendations: walker, rolling   Barriers to discharge: Decreased caregiver support (pt may not have a place to stay after discharge)    Assessment:     Levon Arizmendi is a 28 y.o. male admitted with a medical diagnosis of Acute decompensated heart failure.  He presents with the following impairments/functional limitations:  weakness, impaired endurance, impaired self care skills, impaired functional mobilty, gait instability, decreased safety awareness, pain, decreased ROM, edema. Pt required mode A for bed mobility, mod A to stand, and max A to take a few steps with RW. Pt required increased time for all functional mobility 2nd to pain and unwillingness to participate at times. Pt with questionable effort as L LE functional mobility did not have a consistent presentation throughout the session. Pt would benefit from IP rehab to maximize functional mobility and ensure safety.     Rehab Prognosis:  fair; patient would benefit from acute skilled PT services to address these deficits and reach maximum level of function.      Recent Surgery: * No surgery found *      Plan:     During this hospitalization, patient to be seen 4 x/week to address the above listed problems via gait training, therapeutic activities, therapeutic exercises, neuromuscular re-education  · Plan of Care Expires:  04/30/18   Plan of Care Reviewed with: patient    Subjective     Communicated with RN prior to session.  Patient found in bed upon PT entry to room, agreeable to evaluation.      Chief Complaint: L LE pain  Patient comments/goals: to get better   Pain/Comfort:  · Pain Rating 1: 3/10 (L LE)  · Pain Addressed 1: Distraction  · Pain Rating Post-Intervention 1:  (increase in pain with functional mobility; unrated)    Patients cultural, spiritual, Denominational conflicts given the  "current situation: none reported     Living Environment:  Pt a questionable historian. Pt lives alone but does not know if he will have a place to stay after discharge. Pt might be able to stay with brother. Brother's house is a H with no CHITO to enter  Prior to admission, patients level of function was indep with ambulation and ADL's.  Patient has the following equipment: none.  DME owned (not currently used): none.  Upon discharge, it is unknown if patient will have assistance.    Objective:     Patient found with: telemetry, pulse ox (continuous), blood pressure cuff, central line, glez catheter     General Precautions: Standard, fall   Orthopedic Precautions:N/A   Braces: N/A     Exams:   · RLE ROM: WFL  · RLE Strength: grossly 3+/5  · LLE ROM: mod decrease in AROM  · LLE Strength: grossly 3-/5    Functional Mobility:  · Increased time 2nd to pain and unwillingness to participate at times  · Bed Mobility:     · Scooting:   · contact guard assistance to EOB  · Min A supine up in bed   · Supine to Sit: moderate assistance  · Sit to Supine: moderate assistance  · Transfers:     · Sit to Stand:  moderate assistance with rolling walker  · Gait: ~2 steps with RW with max A   · Decreased javier, decreased step length, increased time for L LE movement   · Pt with inconsistent performance of L LE throughout session     AM-PAC 6 CLICK MOBILITY  Total Score:11       Therapeutic Activities and Exercises:  Educated pt on PT role/POC  Educated pt on importance of OOB activity  Pt verbalized understanding    Pt unsafe to t/f to chair today 2nd to inconsistent presentation of L LE movement and being "unable" to move.        Sitting EOB x 15 minutes to increase tolerance to OOB activity     Patient left HOB elevated with all lines intact, call button in reach and RN notified.    GOALS:    Physical Therapy Goals        Problem: Physical Therapy Goal    Goal Priority Disciplines Outcome Goal Variances Interventions   Physical " Therapy Goal     PT/OT, PT Ongoing (interventions implemented as appropriate)     Description:  Goals to be met by: 2018    Patient will increase functional independence with mobility by performin. Supine to sit with MInimal Assistance - not met  2. Sit to supine with MInimal Assistance - not met  3. Sit to stand transfer with Minimal Assistance with LRAD - not met  4. Bed to chair transfer with Minimal Assistance using LRAD - not met  5. Gait  x 50 feet with Minimal Assistance using LRAD - not met  6. Lower extremity exercise program x15 reps per handout, with supervision - not met                      History:     Past Medical History:   Diagnosis Date    Cardiomyopathy     Hyperlipidemia     Hypertension     Obesity     Systolic heart failure secondary to idiopathic cardiomyopathy        History reviewed. No pertinent surgical history.      Time Tracking:     PT Received On: 18  PT Start Time: 0836     PT Stop Time: 912  PT Total Time (min): 36 min     Billable Minutes: Evaluation 10 and Therapeutic Activity 15      Leda Childress, PT, DPT  2018  [phone]

## 2018-04-03 NOTE — ASSESSMENT & PLAN NOTE
-Continue  gtt at 5mcg/kg/min. Will check ScVO2 this am and consider weaning.   -Diuresing well. Continue Lasix gtt at 30/hr with diuril 500mg PO BID   -Strict I/Os and Daily weights  -Maintain glez catheter for next 24 -48 hours then remove   -Continue spironolactone 25mg daily, Valsartan 40mg BID. Will up titrate as tolerated.  -Holding BB in setting of  gtt   -PT / OT consulted for aggressive ambulation   -Currently not planning for any invasive procedures so restarted apixaban for recent PE  -Not likely candidate for advanced options due to poor social situation/support.

## 2018-04-03 NOTE — ASSESSMENT & PLAN NOTE
- Pt states he dropped dresser on his leg at home.   - Reports OSH was going to place cast?  - No fracture per Xray. Uric acid up a bit. Will give dose of colchicine and follow response.

## 2018-04-03 NOTE — PLAN OF CARE
Problem: Patient Care Overview  Goal: Plan of Care Review  Outcome: Ongoing (interventions implemented as appropriate)  NAEON. Pt more compliant with fluid restriction. See flowsheets for vital signs and assessments. See below for updates on progress made.       Neuro: AAOx4, slept 4-6 hours    Cardiovascular: -130; SBP 90s-100s; SVO2 54; CVPs 14, 12, 11      Pulmonary: RA    Gastrointestinal: good appetite; very small BM, +BS    Genitourinary: voided over 4L per glez    Endocrine: awaiting AM labs    Integumentary/other: weaping from L leg much improved from Monday PM shift    Infusions: lasix at 30,  at 5    POC: diurese, stepdown?     Patient progressing towards goals as tolerated, plan of care communicated and reviewed with Levon Arizmendi. All concerns addressed. Will continue to monitor.

## 2018-04-03 NOTE — SUBJECTIVE & OBJECTIVE
Interval History: No acute issues overnight. Wants to go home.     Continuous Infusions:   DOBUTamine 5 mcg/kg/min (04/03/18 0600)    furosemide (LASIX) 2 mg/mL infusion (non-titrating) 30 mg/hr (04/03/18 0600)     Scheduled Meds:   apixaban  5 mg Oral BID    aspirin  81 mg Oral Daily    chlorothiazide  500 mg Oral BID    colchicine  0.6 mg Oral Once    colchicine  1.2 mg Oral Once    gabapentin  400 mg Oral TID    isosorbide dinitrate  20 mg Oral TID    latanoprost  1 drop Left Eye QHS    magnesium oxide  800 mg Oral BID    magnesium oxide  800 mg Oral Once    pantoprazole  40 mg Oral Daily    potassium chloride SA  40 mEq Oral BID    sodium chloride 0.9%  3 mL Intravenous Q8H    spironolactone  25 mg Oral Daily    valsartan  40 mg Oral BID     PRN Meds:acetaminophen, docusate sodium, hydrocodone-acetaminophen 5-325mg    Review of patient's allergies indicates:  No Known Allergies    Objective:     Vital Signs (Most Recent):  Temp: 98.2 °F (36.8 °C) (04/03/18 0400)  Pulse: 109 (04/03/18 0807)  Resp: (!) 22 (04/03/18 0600)  BP: (!) 104/53 (04/03/18 0600)  SpO2: 99 % (04/03/18 0807) Vital Signs (24h Range):  Temp:  [98 °F (36.7 °C)-98.6 °F (37 °C)] 98.2 °F (36.8 °C)  Pulse:  [106-133] 109  Resp:  [16-27] 22  SpO2:  [93 %-100 %] 99 %  BP: ()/(42-76) 104/53     Patient Vitals for the past 72 hrs (Last 3 readings):   Weight   04/03/18 0652 108.5 kg (239 lb 3.2 oz)   04/03/18 0400 105.8 kg (233 lb 4 oz)   04/02/18 0500 113.4 kg (250 lb)     Body mass index is 42.37 kg/m².    Intake/Output Summary (Last 24 hours) at 04/03/18 0830  Last data filed at 04/03/18 0600   Gross per 24 hour   Intake             2179 ml   Output             8650 ml   Net            -6471 ml     Hemodynamic Parameters:  CVP:  [11 mmHg-16 mmHg] 11 mmHg    Physical Exam   Constitutional: He is oriented to person, place, and time. He appears well-developed and well-nourished.   HENT:   Head: Normocephalic.   Eyes: Pupils are  "equal, round, and reactive to light.   Neck: Normal range of motion. Neck supple.   Cardiovascular: Normal rate and regular rhythm.    Murmur heard.  Pulmonary/Chest: Effort normal and breath sounds normal.   Abdominal: Soft. Bowel sounds are normal.   Musculoskeletal: Normal range of motion. He exhibits edema (2+ BLE edema but improving.).   Neurological: He is alert and oriented to person, place, and time.   Skin: Skin is warm and dry.   Psychiatric: He has a normal mood and affect. His behavior is normal.   Nursing note and vitals reviewed.      Significant Labs:  CBC:    Recent Labs  Lab 04/01/18 0328 04/02/18  0435 04/03/18  0420   WBC 16.68* 16.35* 16.19*   RBC 4.05* 4.06* 4.02*   HGB 10.2* 10.3* 10.2*   HCT 28.3* 27.9* 28.0*   * 116* 131*   MCV 70* 69* 70*   MCH 25.2* 25.4* 25.4*   MCHC 36.0 36.9* 36.4*     BNP:    Recent Labs  Lab 03/30/18  0500   BNP 2,704*     CMP:    Recent Labs  Lab 04/01/18 0328 04/02/18  0435 04/02/18  0759 04/02/18  1829 04/03/18  0420   GLU 86  < > 94 84 84 101   CALCIUM 8.3*  < > 8.7 8.5* 9.0 9.0   ALBUMIN 1.9*  --  1.9*  --   --  1.8*   PROT 5.9*  --  5.9*  --   --  5.9*     < > 135* 135* 134* 134*   K 3.9  < > 4.0 3.8 3.9 3.8   CO2 34*  < > 35* 37* 37* 36*   CL 92*  < > 90* 90* 89* 89*   BUN 30*  < > 25* 24* 24* 23*   CREATININE 1.0  < > 0.9 0.8 0.9 0.9   ALKPHOS 85  --  76  --   --  71   ALT 30  --  29  --   --  25   AST 39  --  36  --   --  32   BILITOT 4.9*  --  5.3*  --   --  6.1*   < > = values in this interval not displayed.     Estimated Creatinine Clearance: 134 mL/min (based on SCr of 0.9 mg/dL).    Diagnostic Results:  CXR: "Cardiomegaly with mild bibasilar edema.  AICD present. Right IJ catheter tip overlies the SVC."  "

## 2018-04-03 NOTE — NURSING TRANSFER
Nursing Transfer Note      4/3/2018     Transfer To: 384A from 3084    Transfer via bed    Transfer with cardiac monitoring    Transported by RN and PCT     Medicines sent: diuril suspension and glaucoma drops     Chart send with patient: Yes    Belongings sent: personal phone  and eyeglasses     Notified: aunt per patient     Patient reassessed at: 1635 (date, time)    Upon arrival to floor: patient oriented to room, call bell in reach and bed in lowest position.

## 2018-04-03 NOTE — PROGRESS NOTES
[facility]  Heart Transplant  Progress Note    Patient Name: Levon Arizmendi  MRN: [id number]  Admission Date: 3/30/2018  Hospital Length of Stay: 4 days  Attending Physician: Jose J Cheney MD  Primary Care Provider: Shane Alonso MD  Principal Problem:Acute decompensated heart failure    Subjective:     Interval History: No acute issues overnight. Wants to go home.     Continuous Infusions:   DOBUTamine 5 mcg/kg/min (04/03/18 0600)    furosemide (LASIX) 2 mg/mL infusion (non-titrating) 30 mg/hr (04/03/18 0600)     Scheduled Meds:   apixaban  5 mg Oral BID    aspirin  81 mg Oral Daily    chlorothiazide  500 mg Oral BID    colchicine  0.6 mg Oral Once    colchicine  1.2 mg Oral Once    gabapentin  400 mg Oral TID    isosorbide dinitrate  20 mg Oral TID    latanoprost  1 drop Left Eye QHS    magnesium oxide  800 mg Oral BID    magnesium oxide  800 mg Oral Once    pantoprazole  40 mg Oral Daily    potassium chloride SA  40 mEq Oral BID    sodium chloride 0.9%  3 mL Intravenous Q8H    spironolactone  25 mg Oral Daily    valsartan  40 mg Oral BID     PRN Meds:acetaminophen, docusate sodium, hydrocodone-acetaminophen 5-325mg    Review of patient's allergies indicates:  No Known Allergies    Objective:     Vital Signs (Most Recent):  Temp: 98.2 °F (36.8 °C) (04/03/18 0400)  Pulse: 109 (04/03/18 0807)  Resp: (!) 22 (04/03/18 0600)  BP: (!) 104/53 (04/03/18 0600)  SpO2: 99 % (04/03/18 0807) Vital Signs (24h Range):  Temp:  [98 °F (36.7 °C)-98.6 °F (37 °C)] 98.2 °F (36.8 °C)  Pulse:  [106-133] 109  Resp:  [16-27] 22  SpO2:  [93 %-100 %] 99 %  BP: ()/(42-76) 104/53     Patient Vitals for the past 72 hrs (Last 3 readings):   Weight   04/03/18 0652 108.5 kg (239 lb 3.2 oz)   04/03/18 0400 105.8 kg (233 lb 4 oz)   04/02/18 0500 113.4 kg (250 lb)     Body mass index is 42.37 kg/m².    Intake/Output Summary (Last 24 hours) at 04/03/18 0830  Last data filed at 04/03/18 0600    Gross per 24 hour   Intake             2179 ml   Output             8650 ml   Net            -6471 ml     Hemodynamic Parameters:  CVP:  [11 mmHg-16 mmHg] 11 mmHg    Physical Exam   Constitutional: He is oriented to person, place, and time. He appears well-developed and well-nourished.   HENT:   Head: Normocephalic.   Eyes: Pupils are equal, round, and reactive to light.   Neck: Normal range of motion. Neck supple.   Cardiovascular: Normal rate and regular rhythm.    Murmur heard.  Pulmonary/Chest: Effort normal and breath sounds normal.   Abdominal: Soft. Bowel sounds are normal.   Musculoskeletal: Normal range of motion. He exhibits edema (2+ BLE edema but improving.).   Neurological: He is alert and oriented to person, place, and time.   Skin: Skin is warm and dry.   Psychiatric: He has a normal mood and affect. His behavior is normal.   Nursing note and vitals reviewed.      Significant Labs:  CBC:    Recent Labs  Lab 04/01/18  0328 04/02/18  0435 04/03/18  0420   WBC 16.68* 16.35* 16.19*   RBC 4.05* 4.06* 4.02*   HGB 10.2* 10.3* 10.2*   HCT 28.3* 27.9* 28.0*   * 116* 131*   MCV 70* 69* 70*   MCH 25.2* 25.4* 25.4*   MCHC 36.0 36.9* 36.4*     BNP:    Recent Labs  Lab 03/30/18  0500   BNP 2,704*     CMP:    Recent Labs  Lab 04/01/18  0328  04/02/18  0435 04/02/18  0759 04/02/18  1829 04/03/18  0420   GLU 86  < > 94 84 84 101   CALCIUM 8.3*  < > 8.7 8.5* 9.0 9.0   ALBUMIN 1.9*  --  1.9*  --   --  1.8*   PROT 5.9*  --  5.9*  --   --  5.9*     < > 135* 135* 134* 134*   K 3.9  < > 4.0 3.8 3.9 3.8   CO2 34*  < > 35* 37* 37* 36*   CL 92*  < > 90* 90* 89* 89*   BUN 30*  < > 25* 24* 24* 23*   CREATININE 1.0  < > 0.9 0.8 0.9 0.9   ALKPHOS 85  --  76  --   --  71   ALT 30  --  29  --   --  25   AST 39  --  36  --   --  32   BILITOT 4.9*  --  5.3*  --   --  6.1*   < > = values in this interval not displayed.     Estimated Creatinine Clearance: 134 mL/min (based on SCr of 0.9 mg/dL).    Diagnostic Results:  CXR:  ""Cardiomegaly with mild bibasilar edema.  AICD present. Right IJ catheter tip overlies the SVC."    Assessment and Plan:     No notes on file    * Acute decompensated heart failure    -Continue  gtt at 5mcg/kg/min. Will check ScVO2 this am and consider weaning.   -Diuresing well. Continue Lasix gtt at 30/hr with diuril 500mg PO BID   -Strict I/Os and Daily weights  -Maintain glez catheter for next 24 -48 hours then remove   -Continue spironolactone 25mg daily, Valsartan 40mg BID. Will up titrate as tolerated.  -Holding BB in setting of  gtt   -PT / OT consulted for aggressive ambulation   -Currently not planning for any invasive procedures so restarted apixaban for recent PE  -Not likely candidate for advanced options due to poor social situation/support.         Acute left ankle pain    - Pt states he dropped dresser on his leg at home.   - Reports OSH was going to place cast?  - No fracture per Xray. Uric acid up a bit. Will give dose of colchicine and follow response.         History of pulmonary embolism    -Diagnosed 1/11/18 at [facility] ([facility])  -Continue Apixaban 5mg BID   -Repeat CT PE study (done before transfer) negative            Micha Guerin NP  Heart Transplant  [facility]  "

## 2018-04-03 NOTE — PLAN OF CARE
Problem: Patient Care Overview  Goal: Plan of Care Review  Outcome: Ongoing (interventions implemented as appropriate)  Pt's VSS throughout day. Lasix and dobutamine drips ongoing. Pt voided 4.5L of urine during day shift. Plan of care reviewed with patient, teaching needs to be readdressed and reinforced. All question addressed and answered. WCTM.

## 2018-04-03 NOTE — PROGRESS NOTES
Patient admitted to CSU. Patient arrived to floor from ICU no evidence of distress; patient AAO x4 at this time. Patient placed on tele. Vital signs obtained. Patient voices no complaints at this time. Plan of care initiated with patient. Bed in lowest position, locked, SR up x2, call bell in reach. Will continue to monitor patient.

## 2018-04-03 NOTE — PROGRESS NOTES
Phoned the [facility] complex where pt has been living. Per Cookie, who states she is the manager, pt has not paid rent for 3 months and states if pt discharges on Monday and comes to the complex he will be able to collect his things. Attempted to reach pt's aunt Travis Angel at [phone] but message states that service is temporarily not available. SW will f/u with pt tomorrow and continue to attempt to reach aunt. SW following and remains available.

## 2018-04-03 NOTE — PLAN OF CARE
Problem: Patient Care Overview  Goal: Individualization & Mutuality  Plan of care discussed with patient. Patient is free of fall/trauma/injury. Denies CP, SOB, or pain/discomfort. Dobutamine and Lasix gtt continued. VSS. AAOx4. All questions addressed. Will continue to monitor.

## 2018-04-04 NOTE — PT/OT/SLP PROGRESS
"Physical Therapy Treatment    Patient Name:  Levon Arizmendi   MRN:  [id number]    Recommendations:     Discharge Recommendations:  nursing facility, skilled (may progress to )   Discharge Equipment Recommendations: walker, rolling   Barriers to discharge: Decreased caregiver support (unsure of where pt would D/C to)    Assessment:     Levon Arizmendi is a 28 y.o. male admitted with a medical diagnosis of Acute decompensated heart failure.  He presents with the following impairments/functional limitations:  weakness, impaired endurance, impaired self care skills, impaired functional mobilty, gait instability, impaired balance, pain . Pt seen in both AM and PM. AM session ended due to pt needing to have BM. In AM pt able to complete stand pivot EOB>commode w/ Mark but upon PT return in PM to attempt gait pt unable to stand due to LLE pain. Pt will continue PT POC.    Rehab Prognosis:  Good; patient would benefit from acute skilled PT services to address these deficits and reach maximum level of function.      Recent Surgery: * No surgery found *      Plan:     During this hospitalization, patient to be seen 4 x/week to address the above listed problems via gait training, therapeutic activities, therapeutic exercises  · Plan of Care Expires:  04/30/18   Plan of Care Reviewed with: patient    Subjective     Communicated with nursing prior to session.  Patient found supine upon PT entry to room, agreeable to treatment.      Chief Complaint: LLE pain in PM session  Patient comments/goals: "My leg is asleep!"  Pain/Comfort:  · Pain Rating 1:  (did not rate)  · Location - Side 1: Left  · Location - Orientation 1: anterior  · Location 1:  (shin)  · Pain Addressed 1: Reposition, Distraction    Patients cultural, spiritual, Uatsdin conflicts given the current situation: none reported    Objective:     Patient found with: peripheral IV, telemetry     General Precautions: Standard, fall   Orthopedic Precautions:N/A   Braces: " "N/A     Functional Mobility:  AM session (10:25-10:40)  · Pt donned socks while long sitting in bed w/ SPV, inc'd time required  · Supine>sit on bed w/ SBA, inc'd time and cueing required  · Stand pivot transfer EOB>bedside commode w/o AD w/ Min/CGA for stability, no LOB, vc's for hand placement  · Pt left on commode, having BM, PCT notified and instructed on how to t/f pt back to bed    PM session (14:05-14:20)  · Pt found supine in bed, pt required inc'd time and encouragement to participate due to fatigue  · Supine>sit EOB w/ SBA, inc'd time required due to LLE pain (nurse present giving medication)  · Static sit EOB w/ Mark, pt reporting severe anterior shin LLE pain due to LLE "falling asleep", PT supporting LLE  · Sit>supine w/ Mark for LLE  · Pt instructed on heel slides and ankle pumps to be performed supine for improved strength and ROM in LE    AM-PAC 6 CLICK MOBILITY  Turning over in bed (including adjusting bedclothes, sheets and blankets)?: 3  Sitting down on and standing up from a chair with arms (e.g., wheelchair, bedside commode, etc.): 3  Moving from lying on back to sitting on the side of the bed?: 3  Moving to and from a bed to a chair (including a wheelchair)?: 3  Need to walk in hospital room?: 2  Climbing 3-5 steps with a railing?: 1  Total Score: 15       Therapeutic Activities and Exercises:   Pt refused to ambulate in PM due to fatigue initially but once sitting EOB reporting LLE pain. Will attempt gait trial next session.    Patient left supine with all lines intact and call button in reach..    GOALS:    Physical Therapy Goals        Problem: Physical Therapy Goal    Goal Priority Disciplines Outcome Goal Variances Interventions   Physical Therapy Goal     PT/OT, PT Ongoing (interventions implemented as appropriate)     Description:  Goals to be met by: 2018    Patient will increase functional independence with mobility by performin. Supine to sit with MInimal Assistance - MET " 4/4/2018  2. Sit to supine with MInimal Assistance - MET 4/4/2018  3. Sit to stand transfer with Minimal Assistance with LRAD - not met  4. Bed to chair transfer with Minimal Assistance using LRAD - MET 4/4/2018  5. Gait  x 50 feet with Minimal Assistance using LRAD - not met  6. Lower extremity exercise program x15 reps per handout, with supervision - not met                       Time Tracking:     PT Received On: 04/04/18  PT Start Time: 1025 (1405)     PT Stop Time: 1040 (1420)  PT Total Time (min): 15 min (15min) = 30min total    Billable Minutes: Therapeutic Activity 30 and Total Time 30    Treatment Type: Treatment  PT/PTA: PT     PTA Visit Number: 0     Rosemarie Evans, PT  04/04/2018

## 2018-04-04 NOTE — PROGRESS NOTES
Update    Pt presents in room alone, aaox4 with pleasant affect. Told pt that his apt manager said pt can come Monday to collect his things. Pt states understanding. Pt reports he has been looking at other rental possibilities and a cousin of his works for a company that will help him with his deposit assistance. Pt states understanding that his aunt Travis Angel's phone ([phone] is temporarily out of service. Pt gives  permission to phone his cousin Herbert Mora [phone] to discuss pt's housing situation. Pt reports he has spoken to his eldest brother who is able to help pt collect things from his apt on brother's days off. Pt also reports he has no clothes to discharge to home in. Pt reports sizes are shirts 1X, pants 42-44, shoes 9-10.5. Team brainstorming how to provide pt with clothes for discharge. Pt will transport via [employer] Transportation. Pt reports coping well and denies further needs, questions, concerns at this time and none indicated. Providing ongoing psychosocial and counseling support, education, resources, assistance and discharge planning as indicated. Following and available.

## 2018-04-04 NOTE — PLAN OF CARE
Problem: Physical Therapy Goal  Goal: Physical Therapy Goal  Goals to be met by: 2018    Patient will increase functional independence with mobility by performin. Supine to sit with MInimal Assistance - MET 2018  2. Sit to supine with MInimal Assistance - MET 2018  3. Sit to stand transfer with Minimal Assistance with LRAD - not met  4. Bed to chair transfer with Minimal Assistance using LRAD - MET 2018  5. Gait  x 50 feet with Minimal Assistance using LRAD - not met  6. Lower extremity exercise program x15 reps per handout, with supervision - not met     Outcome: Ongoing (interventions implemented as appropriate)  LTGs remain appropriate. Pt will continue PT POC.    Rosemarie Evans, PT  2018

## 2018-04-04 NOTE — PLAN OF CARE
Problem: Patient Care Overview  Goal: Individualization & Mutuality  Outcome: Ongoing (interventions implemented as appropriate)  Patient prefer a small towel between his legs, warm pack to LLE for comfort. Head of bed up 45 degrees.

## 2018-04-04 NOTE — PROGRESS NOTES
Nilsa Odom notified of patients VS. Goal is to keep SBP greater than or equal to 80. Will continue to monitor patient.       04/04/18 1537 04/04/18 1538 04/04/18 1540   Vital Signs   Pulse 106 104 105   Heart Rate Source Monitor --  --    Resp 18 --  --    SpO2 97 % --  --    O2 Device (Oxygen Therapy) room air --  --    BP (!) 63/28 (!) 62/33 (!) 80/52   MAP (mmHg) 40 43 --    BP Location Left arm Right arm --    BP Method Automatic Automatic Manual   Patient Position Lying Lying --

## 2018-04-04 NOTE — SUBJECTIVE & OBJECTIVE
Interval History: diuresed well overnight, says he must be home by Friday     Continuous Infusions:   DOBUTamine      furosemide (LASIX) 2 mg/mL infusion (non-titrating) 30 mg/hr (04/04/18 0840)     Scheduled Meds:   apixaban  5 mg Oral BID    aspirin  81 mg Oral Daily    gabapentin  400 mg Oral TID    latanoprost  1 drop Left Eye QHS    magnesium oxide  800 mg Oral BID    magnesium oxide  800 mg Oral Once    pantoprazole  40 mg Oral Daily    potassium chloride SA  40 mEq Oral BID    sodium chloride 0.9%  3 mL Intravenous Q8H    spironolactone  25 mg Oral Daily    valsartan  40 mg Oral BID     PRN Meds:acetaminophen, docusate sodium, hydrocodone-acetaminophen 5-325mg    Review of patient's allergies indicates:  No Known Allergies  Objective:     Vital Signs (Most Recent):  Temp: 97.1 °F (36.2 °C) (04/04/18 1208)  Pulse: 106 (04/04/18 1230)  Resp: 18 (04/04/18 1208)  BP: (!) 103/55 (04/04/18 1230)  SpO2: 95 % (04/04/18 1208) Vital Signs (24h Range):  Temp:  [96.2 °F (35.7 °C)-98.4 °F (36.9 °C)] 97.1 °F (36.2 °C)  Pulse:  [104-120] 106  Resp:  [15-27] 18  SpO2:  [95 %-100 %] 95 %  BP: ()/(51-59) 103/55     Patient Vitals for the past 72 hrs (Last 3 readings):   Weight   04/03/18 0652 108.5 kg (239 lb 3.2 oz)   04/03/18 0400 105.8 kg (233 lb 4 oz)   04/02/18 0500 113.4 kg (250 lb)     Body mass index is 42.37 kg/m².      Intake/Output Summary (Last 24 hours) at 04/04/18 1439  Last data filed at 04/04/18 0834   Gross per 24 hour   Intake          1305.77 ml   Output             6380 ml   Net         -5074.23 ml       Hemodynamic Parameters:           Physical Exam   Constitutional: He is oriented to person, place, and time. He appears well-developed and well-nourished.   HENT:   Head: Normocephalic.   Eyes: Pupils are equal, round, and reactive to light.   Neck: Normal range of motion. Neck supple.   Cardiovascular: Normal rate and regular rhythm.    Murmur heard.  Pulmonary/Chest: Effort normal and  breath sounds normal.   Abdominal: Soft. Bowel sounds are normal.   Musculoskeletal: Normal range of motion. He exhibits edema (2+ BLE edema but improving.).   Neurological: He is alert and oriented to person, place, and time.   Skin: Skin is warm and dry.   Psychiatric: He has a normal mood and affect. His behavior is normal.   Nursing note and vitals reviewed.      Significant Labs:  CBC:    Recent Labs  Lab 04/02/18  0435 04/03/18  0420 04/04/18  0642   WBC 16.35* 16.19* 16.95*   RBC 4.06* 4.02* 4.40*   HGB 10.3* 10.2* 10.9*   HCT 27.9* 28.0* 30.1*   * 131* 159   MCV 69* 70* 68*   MCH 25.4* 25.4* 24.8*   MCHC 36.9* 36.4* 36.2*     BNP:    Recent Labs  Lab 03/30/18  0500   BNP 2,704*     CMP:    Recent Labs  Lab 04/02/18  0435  04/03/18  0420 04/03/18  1014 04/03/18  1906 04/04/18  0642   GLU 94  < > 101 77 136* 91  90   CALCIUM 8.7  < > 9.0 9.0 8.8 9.0  9.1   ALBUMIN 1.9*  --  1.8*  --   --  1.8*   PROT 5.9*  --  5.9*  --   --  6.4   *  < > 134* 133* 132* 134*  134*   K 4.0  < > 3.8 3.7 4.1 3.7  3.7   CO2 35*  < > 36* 35* 28 34*  33*   CL 90*  < > 89* 89* 92* 91*  91*   BUN 25*  < > 23* 21* 21* 20  19   CREATININE 0.9  < > 0.9 0.9 1.0 1.0  0.9   ALKPHOS 76  --  71  --   --  82   ALT 29  --  25  --   --  29   AST 36  --  32  --   --  37   BILITOT 5.3*  --  6.1*  --   --  6.5*   < > = values in this interval not displayed.   Coagulation:     Recent Labs  Lab 03/30/18  0500   INR 2.0*     LDH:  No results for input(s): LDH in the last 72 hours.  Microbiology:  Microbiology Results (last 7 days)     Procedure Component Value Units Date/Time    Urine culture [[id number]] Collected:  03/30/18 0935    Order Status:  Completed Specimen:  Urine from Urine, Catheterized Updated:  03/31/18 1250     Urine Culture, Routine No growth          I have reviewed all pertinent labs within the past 24 hours.    Estimated Creatinine Clearance: 134 mL/min (based on SCr of 0.9 mg/dL).    Diagnostic Results:  I have  reviewed and interpreted all pertinent imaging results/findings within the past 24 hours.

## 2018-04-04 NOTE — PROGRESS NOTES
[facility]  Heart Transplant  Progress Note    Patient Name: Levon Arizmendi  MRN: [id number]  Admission Date: 3/30/2018  Hospital Length of Stay: 5 days  Attending Physician: Jose J Cheney MD  Primary Care Provider: Shane Alonso MD  Principal Problem:Acute decompensated heart failure    Subjective:     Interval History: diuresed well overnight, says he must be home by Friday     Continuous Infusions:   DOBUTamine      furosemide (LASIX) 2 mg/mL infusion (non-titrating) 30 mg/hr (04/04/18 0840)     Scheduled Meds:   apixaban  5 mg Oral BID    aspirin  81 mg Oral Daily    gabapentin  400 mg Oral TID    latanoprost  1 drop Left Eye QHS    magnesium oxide  800 mg Oral BID    magnesium oxide  800 mg Oral Once    pantoprazole  40 mg Oral Daily    potassium chloride SA  40 mEq Oral BID    sodium chloride 0.9%  3 mL Intravenous Q8H    spironolactone  25 mg Oral Daily    valsartan  40 mg Oral BID     PRN Meds:acetaminophen, docusate sodium, hydrocodone-acetaminophen 5-325mg    Review of patient's allergies indicates:  No Known Allergies  Objective:     Vital Signs (Most Recent):  Temp: 97.1 °F (36.2 °C) (04/04/18 1208)  Pulse: 106 (04/04/18 1230)  Resp: 18 (04/04/18 1208)  BP: (!) 103/55 (04/04/18 1230)  SpO2: 95 % (04/04/18 1208) Vital Signs (24h Range):  Temp:  [96.2 °F (35.7 °C)-98.4 °F (36.9 °C)] 97.1 °F (36.2 °C)  Pulse:  [104-120] 106  Resp:  [15-27] 18  SpO2:  [95 %-100 %] 95 %  BP: ()/(51-59) 103/55     Patient Vitals for the past 72 hrs (Last 3 readings):   Weight   04/03/18 0652 108.5 kg (239 lb 3.2 oz)   04/03/18 0400 105.8 kg (233 lb 4 oz)   04/02/18 0500 113.4 kg (250 lb)     Body mass index is 42.37 kg/m².      Intake/Output Summary (Last 24 hours) at 04/04/18 1439  Last data filed at 04/04/18 0834   Gross per 24 hour   Intake          1305.77 ml   Output             6380 ml   Net         -5074.23 ml       Hemodynamic Parameters:           Physical Exam    Constitutional: He is oriented to person, place, and time. He appears well-developed and well-nourished.   HENT:   Head: Normocephalic.   Eyes: Pupils are equal, round, and reactive to light.   Neck: Normal range of motion. Neck supple.   Cardiovascular: Normal rate and regular rhythm.    Murmur heard.  Pulmonary/Chest: Effort normal and breath sounds normal.   Abdominal: Soft. Bowel sounds are normal.   Musculoskeletal: Normal range of motion. He exhibits edema (2+ BLE edema but improving.).   Neurological: He is alert and oriented to person, place, and time.   Skin: Skin is warm and dry.   Psychiatric: He has a normal mood and affect. His behavior is normal.   Nursing note and vitals reviewed.      Significant Labs:  CBC:    Recent Labs  Lab 04/02/18  0435 04/03/18  0420 04/04/18  0642   WBC 16.35* 16.19* 16.95*   RBC 4.06* 4.02* 4.40*   HGB 10.3* 10.2* 10.9*   HCT 27.9* 28.0* 30.1*   * 131* 159   MCV 69* 70* 68*   MCH 25.4* 25.4* 24.8*   MCHC 36.9* 36.4* 36.2*     BNP:    Recent Labs  Lab 03/30/18  0500   BNP 2,704*     CMP:    Recent Labs  Lab 04/02/18  0435  04/03/18  0420 04/03/18  1014 04/03/18  1906 04/04/18  0642   GLU 94  < > 101 77 136* 91  90   CALCIUM 8.7  < > 9.0 9.0 8.8 9.0  9.1   ALBUMIN 1.9*  --  1.8*  --   --  1.8*   PROT 5.9*  --  5.9*  --   --  6.4   *  < > 134* 133* 132* 134*  134*   K 4.0  < > 3.8 3.7 4.1 3.7  3.7   CO2 35*  < > 36* 35* 28 34*  33*   CL 90*  < > 89* 89* 92* 91*  91*   BUN 25*  < > 23* 21* 21* 20  19   CREATININE 0.9  < > 0.9 0.9 1.0 1.0  0.9   ALKPHOS 76  --  71  --   --  82   ALT 29  --  25  --   --  29   AST 36  --  32  --   --  37   BILITOT 5.3*  --  6.1*  --   --  6.5*   < > = values in this interval not displayed.   Coagulation:     Recent Labs  Lab 03/30/18  0500   INR 2.0*     LDH:  No results for input(s): LDH in the last 72 hours.  Microbiology:  Microbiology Results (last 7 days)     Procedure Component Value Units Date/Time    Urine culture  [[id number]] Collected:  03/30/18 0935    Order Status:  Completed Specimen:  Urine from Urine, Catheterized Updated:  03/31/18 1250     Urine Culture, Routine No growth          I have reviewed all pertinent labs within the past 24 hours.    Estimated Creatinine Clearance: 134 mL/min (based on SCr of 0.9 mg/dL).    Diagnostic Results:  I have reviewed and interpreted all pertinent imaging results/findings within the past 24 hours.    Assessment and Plan:     No notes on file    * Acute decompensated heart failure    -Continue  gtt will decrease to 2.5  -Diuresing well. Continue Lasix gtt will decrease to 15 mg/hr  -Strict I/Os and Daily weights  -Maintain glez catheter for next 24 -48 hours then remove   -Continue spironolactone 25mg daily, Valsartan 40mg BID. Will up titrate as tolerated. Stopped isordil  -Holding BB in setting of  gtt   -PT / OT consulted for aggressive ambulation   -Currently not planning for any invasive procedures so restarted apixaban for recent PE  -Not likely candidate for advanced options due to poor social situation/support.         Acute left ankle pain    - Pt states he dropped dresser on his leg at home.   - Reports OSH was going to place cast?  - No fracture per Xray. Uric acid up a bit. Will give dose of colchicine and follow response.         History of pulmonary embolism    -Diagnosed 1/11/18 at [facility] ([facility])  -Continue Apixaban 5mg BID   -Repeat CT PE study (done before transfer) negative            MILAGRO Arroyo  Heart Transplant  [facility]

## 2018-04-04 NOTE — NURSING
Chart check completed, abnormal VS noted, bedside RNKateryna contacted, no concerns verbalized at this time, instructed to call [phone] for further concerns or assistance.    Vitals:    04/03/18 2309   BP:    Pulse: (!) 116   Resp:    Temp:

## 2018-04-04 NOTE — PLAN OF CARE
Problem: Patient Care Overview  Goal: Plan of Care Review  Outcome: Ongoing (interventions implemented as appropriate)  Patient vital signs stable.On  & max conc Lasix drip, monitor urine output closely, educated patient on the importance of utilizing the urinal, LLE weaping has improved. Left foot w/ vascular boot, Stepdown from critical care on 4/3/2018, telemetry-, Teaching about plan of care reinforced.

## 2018-04-04 NOTE — ASSESSMENT & PLAN NOTE
-Diagnosed 1/11/18 at [facility] ([facility])  -Continue Apixaban 5mg BID   -Repeat CT PE study (done before transfer) negative

## 2018-04-04 NOTE — ASSESSMENT & PLAN NOTE
-Continue  gtt will decrease to 2.5  -Diuresing well. Continue Lasix gtt will decrease to 15 mg/hr  -Strict I/Os and Daily weights  -Maintain glez catheter for next 24 -48 hours then remove   -Continue spironolactone 25mg daily, Valsartan 40mg BID. Will up titrate as tolerated. Stopped isordil  -Holding BB in setting of  gtt   -PT / OT consulted for aggressive ambulation   -Currently not planning for any invasive procedures so restarted apixaban for recent PE  -Not likely candidate for advanced options due to poor social situation/support.

## 2018-04-04 NOTE — PLAN OF CARE
Problem: Patient Care Overview  Goal: Plan of Care Review  Outcome: Ongoing (interventions implemented as appropriate)  Plan of care reviewed with patient. Patient remains free from injury. No acute events noted at time. Lasix and dobutamine gtt continued. Will continue to monitor patient.

## 2018-04-05 NOTE — PLAN OF CARE
Problem: Patient Care Overview  Goal: Plan of Care Review  Outcome: Ongoing (interventions implemented as appropriate)  Pt remained stable throughout the day. No acute distress noted. Pt remained free from injury. VSS. Pt denies any chest pain or SOB.  2.5mcg. Lasix gtt. RUQ US done today. Pt understands plan of care. Will continue to monitor.

## 2018-04-05 NOTE — PROGRESS NOTES
[facility]  Heart Transplant  Progress Note    Patient Name: Levon Arizmendi  MRN: [id number]  Admission Date: 3/30/2018  Hospital Length of Stay: 6 days  Attending Physician: Jose J Cheney MD  Primary Care Provider: Shane Alonso MD  Principal Problem:Acute decompensated heart failure    Subjective:     Interval History: patient did not diurese as well on less  and lasix, US done of abdomen awaiting result     Continuous Infusions:   DOBUTamine 2.5 mcg/kg/min (04/04/18 1709)    furosemide (LASIX) 2 mg/mL infusion (non-titrating) 10 mg/hr (04/05/18 1006)     Scheduled Meds:   apixaban  5 mg Oral BID    aspirin  81 mg Oral Daily    gabapentin  400 mg Oral TID    latanoprost  1 drop Left Eye QHS    magnesium oxide  800 mg Oral BID    magnesium oxide  800 mg Oral Once    pantoprazole  40 mg Oral Daily    sodium chloride 0.9%  3 mL Intravenous Q8H    spironolactone  25 mg Oral Daily    valsartan  40 mg Oral BID     PRN Meds:acetaminophen, docusate sodium, hydrocodone-acetaminophen 5-325mg    Review of patient's allergies indicates:  No Known Allergies  Objective:     Vital Signs (Most Recent):  Temp: 97.3 °F (36.3 °C) (04/05/18 0809)  Pulse: 110 (04/05/18 1055)  Resp: 18 (04/05/18 0809)  BP: (!) 100/56 (04/05/18 0809)  SpO2: 95 % (04/05/18 0809) Vital Signs (24h Range):  Temp:  [97.1 °F (36.2 °C)-98.8 °F (37.1 °C)] 97.3 °F (36.3 °C)  Pulse:  [] 110  Resp:  [18] 18  SpO2:  [94 %-99 %] 95 %  BP: ()/(28-64) 100/56     Patient Vitals for the past 72 hrs (Last 3 readings):   Weight   04/03/18 0652 108.5 kg (239 lb 3.2 oz)   04/03/18 0400 105.8 kg (233 lb 4 oz)     Body mass index is 42.37 kg/m².      Intake/Output Summary (Last 24 hours) at 04/05/18 1131  Last data filed at 04/05/18 0800   Gross per 24 hour   Intake           1194.4 ml   Output             3375 ml   Net          -2180.6 ml       Hemodynamic Parameters:           Physical Exam   Constitutional: He is  oriented to person, place, and time. He appears well-developed and well-nourished.   HENT:   Head: Normocephalic.   Eyes: Pupils are equal, round, and reactive to light.   Neck: Normal range of motion. Neck supple.   Cardiovascular: Normal rate and regular rhythm.    Murmur heard.  Pulmonary/Chest: Effort normal and breath sounds normal.   Abdominal: Soft. Bowel sounds are normal.   Musculoskeletal: Normal range of motion. He exhibits edema (2+ BLE edema but improving.).   Neurological: He is alert and oriented to person, place, and time.   Skin: Skin is warm and dry.   Psychiatric: He has a normal mood and affect. His behavior is normal.   Nursing note and vitals reviewed.      Significant Labs:  CBC:    Recent Labs  Lab 04/03/18  0420 04/04/18  0642 04/05/18  0632   WBC 16.19* 16.95* 18.40*   RBC 4.02* 4.40* 4.37*   HGB 10.2* 10.9* 10.8*   HCT 28.0* 30.1* 29.6*   * 159 172   MCV 70* 68* 68*   MCH 25.4* 24.8* 24.7*   MCHC 36.4* 36.2* 36.5*     BNP:    Recent Labs  Lab 03/30/18  0500   BNP 2,704*     CMP:    Recent Labs  Lab 04/03/18  0420  04/04/18  0642 04/04/18  1759 04/05/18  0632     < > 91  90 94 80   CALCIUM 9.0  < > 9.0  9.1 9.3 8.8   ALBUMIN 1.8*  --  1.8*  --  1.9*   PROT 5.9*  --  6.4  --  6.5   *  < > 134*  134* 132* 131*   K 3.8  < > 3.7  3.7 4.5 4.1   CO2 36*  < > 34*  33* 30* 30*   CL 89*  < > 91*  91* 91* 91*   BUN 23*  < > 20  19 20 21*   CREATININE 0.9  < > 1.0  0.9 1.1 1.0   ALKPHOS 71  --  82  --  77   ALT 25  --  29  --  32   AST 32  --  37  --  43*   BILITOT 6.1*  --  6.5*  --  6.6*   < > = values in this interval not displayed.   Coagulation:     Recent Labs  Lab 03/30/18  0500   INR 2.0*     LDH:  No results for input(s): LDH in the last 72 hours.  Microbiology:  Microbiology Results (last 7 days)     Procedure Component Value Units Date/Time    Blood culture [[id number]] Collected:  04/05/18 1049    Order Status:  Sent Specimen:  Blood Updated:  04/05/18 1050     Blood culture [[id number]] Collected:  04/05/18 1048    Order Status:  Sent Specimen:  Blood Updated:  04/05/18 1056    Urine culture [[id number]] Collected:  03/30/18 0935    Order Status:  Completed Specimen:  Urine from Urine, Catheterized Updated:  03/31/18 1250     Urine Culture, Routine No growth          I have reviewed all pertinent labs within the past 24 hours.    Estimated Creatinine Clearance: 120.6 mL/min (based on SCr of 1 mg/dL).    Diagnostic Results:  I have reviewed and interpreted all pertinent imaging results/findings within the past 24 hours.    Assessment and Plan:     No notes on file    * Acute decompensated heart failure    -Continue  gtt at 2.5, will attempt to stop before discharge  -Diuresing continue gtt  -Strict I/Os and Daily weights  -Continue spironolactone 25mg daily, Valsartan 40mg BID. Will up titrate as tolerated. Stopped isordil  -Holding BB in setting of  gtt   -PT / OT consulted for aggressive ambulation   -Currently not planning for any invasive procedures so restarted apixaban for recent PE  -Not likely candidate for advanced options due to poor social situation/support.         Acute left ankle pain    - Pt states he dropped dresser on his leg at home.   - Reports OSH was going to place cast?  - No fracture per Xray. Uric acid up a bit. Check ESR and CRP        History of pulmonary embolism    -Diagnosed 1/11/18 at [facility] ([facility])  -Continue Apixaban 5mg BID   -Repeat CT PE study (done before transfer) negative            MILAGRO Arroyo  Heart Transplant  [facility]

## 2018-04-05 NOTE — PLAN OF CARE
Problem: Patient Care Overview  Goal: Plan of Care Review  Outcome: Ongoing (interventions implemented as appropriate)  Plan of care reviewed with patient. Patient remains free from injury. No acute events noted at time. Lasix and dobutamine gtt decreased today. Continue to monitor urine output. Will continue to monitor patient

## 2018-04-05 NOTE — PROGRESS NOTES
7 beat run of VT, denies palpitations and other symptoms. BP automatic pressure 84/49 manual pressure of 94/56. Pt lethargic, responds to stacy stimuli but does not remain awake. Notified dr. Joe MD will come assess patient.

## 2018-04-05 NOTE — PROGRESS NOTES
Pt refusing dobutamine dose increase, states the doctors said they were not going to increase the dose. Notified MILAGRO nicole orders not to increase dobutamine she will change the order.  currently at original dose of 2.5mcg.

## 2018-04-05 NOTE — ASSESSMENT & PLAN NOTE
-Diagnosed 1/11/18 at [facility] [place] ([facility])  -Continue Apixaban 5mg BID   -Repeat CT PE study (done before transfer) negative

## 2018-04-05 NOTE — ASSESSMENT & PLAN NOTE
-Continue  gtt at 2.5, will attempt to stop before discharge  -Diuresing continue gtt  -Strict I/Os and Daily weights  -Continue spironolactone 25mg daily, Valsartan 40mg BID. Will up titrate as tolerated. Stopped isordil  -Holding BB in setting of  gtt   -PT / OT consulted for aggressive ambulation   -Currently not planning for any invasive procedures so restarted apixaban for recent PE  -Not likely candidate for advanced options due to poor social situation/support.

## 2018-04-05 NOTE — SUBJECTIVE & OBJECTIVE
Interval History: patient did not diurese as well on less  and lasix, US done of abdomen awaiting result     Continuous Infusions:   DOBUTamine 2.5 mcg/kg/min (04/04/18 1709)    furosemide (LASIX) 2 mg/mL infusion (non-titrating) 10 mg/hr (04/05/18 1006)     Scheduled Meds:   apixaban  5 mg Oral BID    aspirin  81 mg Oral Daily    gabapentin  400 mg Oral TID    latanoprost  1 drop Left Eye QHS    magnesium oxide  800 mg Oral BID    magnesium oxide  800 mg Oral Once    pantoprazole  40 mg Oral Daily    sodium chloride 0.9%  3 mL Intravenous Q8H    spironolactone  25 mg Oral Daily    valsartan  40 mg Oral BID     PRN Meds:acetaminophen, docusate sodium, hydrocodone-acetaminophen 5-325mg    Review of patient's allergies indicates:  No Known Allergies  Objective:     Vital Signs (Most Recent):  Temp: 97.3 °F (36.3 °C) (04/05/18 0809)  Pulse: 110 (04/05/18 1055)  Resp: 18 (04/05/18 0809)  BP: (!) 100/56 (04/05/18 0809)  SpO2: 95 % (04/05/18 0809) Vital Signs (24h Range):  Temp:  [97.1 °F (36.2 °C)-98.8 °F (37.1 °C)] 97.3 °F (36.3 °C)  Pulse:  [] 110  Resp:  [18] 18  SpO2:  [94 %-99 %] 95 %  BP: ()/(28-64) 100/56     Patient Vitals for the past 72 hrs (Last 3 readings):   Weight   04/03/18 0652 108.5 kg (239 lb 3.2 oz)   04/03/18 0400 105.8 kg (233 lb 4 oz)     Body mass index is 42.37 kg/m².      Intake/Output Summary (Last 24 hours) at 04/05/18 1131  Last data filed at 04/05/18 0800   Gross per 24 hour   Intake           1194.4 ml   Output             3375 ml   Net          -2180.6 ml       Hemodynamic Parameters:           Physical Exam   Constitutional: He is oriented to person, place, and time. He appears well-developed and well-nourished.   HENT:   Head: Normocephalic.   Eyes: Pupils are equal, round, and reactive to light.   Neck: Normal range of motion. Neck supple.   Cardiovascular: Normal rate and regular rhythm.    Murmur heard.  Pulmonary/Chest: Effort normal and breath sounds  normal.   Abdominal: Soft. Bowel sounds are normal.   Musculoskeletal: Normal range of motion. He exhibits edema (2+ BLE edema but improving.).   Neurological: He is alert and oriented to person, place, and time.   Skin: Skin is warm and dry.   Psychiatric: He has a normal mood and affect. His behavior is normal.   Nursing note and vitals reviewed.      Significant Labs:  CBC:    Recent Labs  Lab 04/03/18  0420 04/04/18  0642 04/05/18  0632   WBC 16.19* 16.95* 18.40*   RBC 4.02* 4.40* 4.37*   HGB 10.2* 10.9* 10.8*   HCT 28.0* 30.1* 29.6*   * 159 172   MCV 70* 68* 68*   MCH 25.4* 24.8* 24.7*   MCHC 36.4* 36.2* 36.5*     BNP:    Recent Labs  Lab 03/30/18  0500   BNP 2,704*     CMP:    Recent Labs  Lab 04/03/18  0420  04/04/18  0642 04/04/18  1759 04/05/18  0632     < > 91  90 94 80   CALCIUM 9.0  < > 9.0  9.1 9.3 8.8   ALBUMIN 1.8*  --  1.8*  --  1.9*   PROT 5.9*  --  6.4  --  6.5   *  < > 134*  134* 132* 131*   K 3.8  < > 3.7  3.7 4.5 4.1   CO2 36*  < > 34*  33* 30* 30*   CL 89*  < > 91*  91* 91* 91*   BUN 23*  < > 20  19 20 21*   CREATININE 0.9  < > 1.0  0.9 1.1 1.0   ALKPHOS 71  --  82  --  77   ALT 25  --  29  --  32   AST 32  --  37  --  43*   BILITOT 6.1*  --  6.5*  --  6.6*   < > = values in this interval not displayed.   Coagulation:     Recent Labs  Lab 03/30/18  0500   INR 2.0*     LDH:  No results for input(s): LDH in the last 72 hours.  Microbiology:  Microbiology Results (last 7 days)     Procedure Component Value Units Date/Time    Blood culture [[id number]] Collected:  04/05/18 1049    Order Status:  Sent Specimen:  Blood Updated:  04/05/18 1056    Blood culture [[id number]] Collected:  04/05/18 1048    Order Status:  Sent Specimen:  Blood Updated:  04/05/18 1056    Urine culture [[id number]] Collected:  03/30/18 0935    Order Status:  Completed Specimen:  Urine from Urine, Catheterized Updated:  03/31/18 1250     Urine Culture, Routine No growth          I have reviewed all  pertinent labs within the past 24 hours.    Estimated Creatinine Clearance: 120.6 mL/min (based on SCr of 1 mg/dL).    Diagnostic Results:  I have reviewed and interpreted all pertinent imaging results/findings within the past 24 hours.

## 2018-04-05 NOTE — ASSESSMENT & PLAN NOTE
- Pt states he dropped dresser on his leg at home.   - Reports OSH was going to place cast?  - No fracture per Xray. Uric acid up a bit. Check ESR and CRP

## 2018-04-06 NOTE — ASSESSMENT & PLAN NOTE
-Diagnosed 1/11/18 at [facility] ([facility])  -Continue Apixaban 5mg BID   -Repeat CT PE study (done before transfer) negative

## 2018-04-06 NOTE — NURSING
Chart check completed, abnormal VS noted, bedside RN,  contacted, no concerns verbalized at this time, instructed to call [phone] for further concerns or assistance.    Vitals:    04/06/18 0236   BP: (!) 88/54   Pulse: (!) 123   Resp:    Temp:

## 2018-04-06 NOTE — ASSESSMENT & PLAN NOTE
- Pt states he dropped dresser on his leg at home.   - Reports OSH was going to place cast?  - No fracture per Xray. Uric acid up a bit. CRP elevated.  - Left leg TTP, superficial. Will CT his leg with contrast today to r/o infection

## 2018-04-06 NOTE — PROGRESS NOTES
[facility]  Heart Transplant  Progress Note    Patient Name: Levon Arizmendi  MRN: [id number]  Admission Date: 3/30/2018  Hospital Length of Stay: 7 days  Attending Physician: Jose J Cheney MD  Primary Care Provider: Shane Alonso MD  Principal Problem:Acute decompensated heart failure    Subjective:     Interval History: states he wants to leave, but doesn't have a ride. Did not diurese well overnight.    Continuous Infusions:   DOBUTamine 2.5 mcg/kg/min (04/05/18 1640)    furosemide (LASIX) 2 mg/mL infusion (non-titrating) 10 mg/hr (04/06/18 0604)     Scheduled Meds:   apixaban  5 mg Oral BID    aspirin  81 mg Oral Daily    gabapentin  400 mg Oral TID    latanoprost  1 drop Left Eye QHS    magnesium oxide  800 mg Oral BID    magnesium oxide  800 mg Oral Once    pantoprazole  40 mg Oral Daily    sodium chloride 0.9%  3 mL Intravenous Q8H    spironolactone  25 mg Oral Daily     PRN Meds:acetaminophen, docusate sodium, hydrocodone-acetaminophen 5-325mg    Review of patient's allergies indicates:  No Known Allergies  Objective:     Vital Signs (Most Recent):  Temp: 97.3 °F (36.3 °C) (04/06/18 1122)  Pulse: (!) 126 (04/06/18 1126)  Resp: 18 (04/06/18 1122)  BP: (!) 90/51 (04/06/18 1122)  SpO2: 98 % (04/06/18 1122) Vital Signs (24h Range):  Temp:  [97.1 °F (36.2 °C)-100.1 °F (37.8 °C)] 97.3 °F (36.3 °C)  Pulse:  [104-128] 126  Resp:  [16-18] 18  SpO2:  [94 %-99 %] 98 %  BP: ()/(51-58) 90/51     No data found.    Body mass index is 42.37 kg/m².      Intake/Output Summary (Last 24 hours) at 04/06/18 1251  Last data filed at 04/06/18 0858   Gross per 24 hour   Intake             1240 ml   Output             1000 ml   Net              240 ml       Hemodynamic Parameters:    Physical Exam   Constitutional: He is oriented to person, place, and time. He appears well-developed and well-nourished.   HENT:   Head: Normocephalic.   Eyes: Pupils are equal, round, and reactive to  light.   Neck: Normal range of motion. Neck supple. JVD present.   Cardiovascular: Normal rate and regular rhythm.    Murmur heard.  Pulmonary/Chest: Effort normal and breath sounds normal.   Abdominal: Soft. Bowel sounds are normal.   Musculoskeletal: Normal range of motion. He exhibits edema (2+ BLE edema but improving.).   Neurological: He is alert and oriented to person, place, and time.   Skin: Skin is warm and dry.   Psychiatric: He has a normal mood and affect. His behavior is normal.   Nursing note and vitals reviewed.    Significant Labs:  CBC:    Recent Labs  Lab 04/04/18  0642 04/05/18  0632 04/06/18  0624   WBC 16.95* 18.40* 15.60*   RBC 4.40* 4.37* 4.61   HGB 10.9* 10.8* 11.2*   HCT 30.1* 29.6* 32.1*    172 249   MCV 68* 68* 70*   MCH 24.8* 24.7* 24.3*   MCHC 36.2* 36.5* 34.9     BNP:  No results for input(s): BNP in the last 168 hours.    Invalid input(s): BNPTRIAGELBLO  CMP:    Recent Labs  Lab 04/04/18  0642 04/04/18  1759 04/05/18  0632 04/06/18  0624   GLU 91  90 94 80 87   CALCIUM 9.0  9.1 9.3 8.8 9.2   ALBUMIN 1.8*  --  1.9* 2.0*   PROT 6.4  --  6.5 6.9   *  134* 132* 131* 132*   K 3.7  3.7 4.5 4.1 4.1   CO2 34*  33* 30* 30* 28   CL 91*  91* 91* 91* 93*   BUN 20  19 20 21* 27*   CREATININE 1.0  0.9 1.1 1.0 1.1   ALKPHOS 82  --  77 92   ALT 29  --  32 37   AST 37  --  43* 45*   BILITOT 6.5*  --  6.6* 6.8*      Coagulation:   No results for input(s): PT, INR, APTT in the last 168 hours.  LDH:  No results for input(s): LDH in the last 72 hours.  Microbiology:  Microbiology Results (last 7 days)     Procedure Component Value Units Date/Time    Blood culture [[id number]] Collected:  04/05/18 1049    Order Status:  Completed Specimen:  Blood Updated:  04/06/18 1212     Blood Culture, Routine No Growth to date     Blood Culture, Routine No Growth to date    Blood culture [[id number]] Collected:  04/05/18 1048    Order Status:  Completed Specimen:  Blood Updated:  04/06/18 1212      Blood Culture, Routine No Growth to date     Blood Culture, Routine No Growth to date    Urine culture [[id number]] Collected:  03/30/18 0900    Order Status:  Completed Specimen:  Urine from Urine, Catheterized Updated:  03/31/18 1250     Urine Culture, Routine No growth          I have reviewed all pertinent labs within the past 24 hours.    Estimated Creatinine Clearance: 109.6 mL/min (based on SCr of 1.1 mg/dL).    Diagnostic Results:  I have reviewed and interpreted all pertinent imaging results/findings within the past 24 hours.    Assessment and Plan:     No notes on file    * Acute decompensated heart failure    -Continue  gtt at 2.5, will attempt to stop before discharge. In the interim, increasing to 5 today to help with diuresis  -Diuresing continue gtt (increase from 10 to 20 today)  -Strict I/Os and Daily weights  -Continue spironolactone 25mg daily, Valsartan 40mg BID. Will up titrate as tolerated. Stopped isordil  -Holding BB in setting of  gtt   -PT / OT consulted for aggressive ambulation   -Currently not planning for any invasive procedures so restarted apixaban for recent PE  -Not likely candidate for advanced options due to poor social situation/support.         Acute left ankle pain    - Pt states he dropped dresser on his leg at home.   - Reports OSH was going to place cast?  - No fracture per Xray. Uric acid up a bit. CRP elevated.  - Left leg TTP, superficial. Will CT his leg with contrast today to r/o infection        History of pulmonary embolism    -Diagnosed 1/11/18 at [facility] [place] ([facility])  -Continue Apixaban 5mg BID   -Repeat CT PE study (done before transfer) negative            Luisana Aguero PA-C  Heart Transplant  [facility]

## 2018-04-06 NOTE — SUBJECTIVE & OBJECTIVE
Interval History: states he wants to leave, but doesn't have a ride. Did not diurese well overnight.    Continuous Infusions:   DOBUTamine 2.5 mcg/kg/min (04/05/18 1640)    furosemide (LASIX) 2 mg/mL infusion (non-titrating) 10 mg/hr (04/06/18 0604)     Scheduled Meds:   apixaban  5 mg Oral BID    aspirin  81 mg Oral Daily    gabapentin  400 mg Oral TID    latanoprost  1 drop Left Eye QHS    magnesium oxide  800 mg Oral BID    magnesium oxide  800 mg Oral Once    pantoprazole  40 mg Oral Daily    sodium chloride 0.9%  3 mL Intravenous Q8H    spironolactone  25 mg Oral Daily     PRN Meds:acetaminophen, docusate sodium, hydrocodone-acetaminophen 5-325mg    Review of patient's allergies indicates:  No Known Allergies  Objective:     Vital Signs (Most Recent):  Temp: 97.3 °F (36.3 °C) (04/06/18 1122)  Pulse: (!) 126 (04/06/18 1126)  Resp: 18 (04/06/18 1122)  BP: (!) 90/51 (04/06/18 1122)  SpO2: 98 % (04/06/18 1122) Vital Signs (24h Range):  Temp:  [97.1 °F (36.2 °C)-100.1 °F (37.8 °C)] 97.3 °F (36.3 °C)  Pulse:  [104-128] 126  Resp:  [16-18] 18  SpO2:  [94 %-99 %] 98 %  BP: ()/(51-58) 90/51     No data found.    Body mass index is 42.37 kg/m².      Intake/Output Summary (Last 24 hours) at 04/06/18 1251  Last data filed at 04/06/18 0858   Gross per 24 hour   Intake             1240 ml   Output             1000 ml   Net              240 ml       Hemodynamic Parameters:    Physical Exam   Constitutional: He is oriented to person, place, and time. He appears well-developed and well-nourished.   HENT:   Head: Normocephalic.   Eyes: Pupils are equal, round, and reactive to light.   Neck: Normal range of motion. Neck supple. JVD present.   Cardiovascular: Normal rate and regular rhythm.    Murmur heard.  Pulmonary/Chest: Effort normal and breath sounds normal.   Abdominal: Soft. Bowel sounds are normal.   Musculoskeletal: Normal range of motion. He exhibits edema (2+ BLE edema but improving.).   Neurological:  He is alert and oriented to person, place, and time.   Skin: Skin is warm and dry.   Psychiatric: He has a normal mood and affect. His behavior is normal.   Nursing note and vitals reviewed.    Significant Labs:  CBC:    Recent Labs  Lab 04/04/18 0642 04/05/18  0632 04/06/18  0624   WBC 16.95* 18.40* 15.60*   RBC 4.40* 4.37* 4.61   HGB 10.9* 10.8* 11.2*   HCT 30.1* 29.6* 32.1*    172 249   MCV 68* 68* 70*   MCH 24.8* 24.7* 24.3*   MCHC 36.2* 36.5* 34.9     BNP:  No results for input(s): BNP in the last 168 hours.    Invalid input(s): BNPTRIAGELBLO  CMP:    Recent Labs  Lab 04/04/18  0642 04/04/18  1759 04/05/18 0632 04/06/18  0624   GLU 91  90 94 80 87   CALCIUM 9.0  9.1 9.3 8.8 9.2   ALBUMIN 1.8*  --  1.9* 2.0*   PROT 6.4  --  6.5 6.9   *  134* 132* 131* 132*   K 3.7  3.7 4.5 4.1 4.1   CO2 34*  33* 30* 30* 28   CL 91*  91* 91* 91* 93*   BUN 20  19 20 21* 27*   CREATININE 1.0  0.9 1.1 1.0 1.1   ALKPHOS 82  --  77 92   ALT 29  --  32 37   AST 37  --  43* 45*   BILITOT 6.5*  --  6.6* 6.8*      Coagulation:   No results for input(s): PT, INR, APTT in the last 168 hours.  LDH:  No results for input(s): LDH in the last 72 hours.  Microbiology:  Microbiology Results (last 7 days)     Procedure Component Value Units Date/Time    Blood culture [[id number]] Collected:  04/05/18 1049    Order Status:  Completed Specimen:  Blood Updated:  04/06/18 1212     Blood Culture, Routine No Growth to date     Blood Culture, Routine No Growth to date    Blood culture [[id number]] Collected:  04/05/18 1048    Order Status:  Completed Specimen:  Blood Updated:  04/06/18 1212     Blood Culture, Routine No Growth to date     Blood Culture, Routine No Growth to date    Urine culture [[id number]] Collected:  03/30/18 0935    Order Status:  Completed Specimen:  Urine from Urine, Catheterized Updated:  03/31/18 1250     Urine Culture, Routine No growth          I have reviewed all pertinent labs within the past 24  hours.    Estimated Creatinine Clearance: 109.6 mL/min (based on SCr of 1.1 mg/dL).    Diagnostic Results:  I have reviewed and interpreted all pertinent imaging results/findings within the past 24 hours.

## 2018-04-06 NOTE — NURSING TRANSFER
Nursing Transfer Note      4/6/2018     Transfer To: CT    Transfer via stretcher    Transfer with cardiac monitoring    Transported by transport tech

## 2018-04-06 NOTE — ASSESSMENT & PLAN NOTE
-Continue  gtt at 2.5, will attempt to stop before discharge. In the interim, increasing to 5 today to help with diuresis  -Diuresing continue gtt (increase from 10 to 20 today)  -Strict I/Os and Daily weights  -Continue spironolactone 25mg daily, Valsartan 40mg BID. Will up titrate as tolerated. Stopped isordil  -Holding BB in setting of  gtt   -PT / OT consulted for aggressive ambulation   -Currently not planning for any invasive procedures so restarted apixaban for recent PE  -Not likely candidate for advanced options due to poor social situation/support.

## 2018-04-06 NOTE — PLAN OF CARE
Problem: Physical Therapy Goal  Goal: Physical Therapy Goal  Goals to be met by: 2018    Patient will increase functional independence with mobility by performin. Supine to sit with MInimal Assistance - MET 2018   Revised: supine to sit with Mod (I)  2. Sit to supine with MInimal Assistance - MET 2018   Revised: sit to supine with mod (I)  3. Sit to stand transfer with Minimal Assistance with LRAD - not met  4. Bed to chair transfer with Minimal Assistance using LRAD - MET 2018   Revised: bed to chair transfer with SBA using LRAD  5. Gait  x 50 feet with Minimal Assistance using LRAD - not met  6. Lower extremity exercise program x15 reps per handout, with supervision - not met      Outcome: Ongoing (interventions implemented as appropriate)  Pt goals revised.    ALFONZO MOSELEY, PT  2018

## 2018-04-06 NOTE — PLAN OF CARE
Problem: Patient Care Overview  Goal: Plan of Care Review  Outcome: Ongoing (interventions implemented as appropriate)  PT is AAOX3, no acute changes noted during shift, pt has been on bedrest during shift and repositioned q2, VSS ex bp has been low, MD aware of pt status. No skin breakdown noted, No falls noted. Fall precautions remain Pain assessed. Pain is controlled with repositioning and resting, pt refused pain meds ordered.  Pt resting comfortable in bed. Call light in reach. Will continue to monitor.

## 2018-04-06 NOTE — CARE UPDATE
Chart check completed, abnormal VS noted, bedside RNDiamond contacted, no concerns verbalized at this time, instructed to call [phone] for further concerns or assistance.    Vitals:    04/06/18 1539   BP: (!) 98/48   Pulse: (!) 120   Resp: 16   Temp: 97 °F (36.1 °C)

## 2018-04-06 NOTE — PLAN OF CARE
Problem: Patient Care Overview  Goal: Plan of Care Review  Outcome: Ongoing (interventions implemented as appropriate)  Pt remained stable throughout the day. No acute distress noted. Pt remained free from injury. Hr sustaining in 120's. Pt denies any chest pain or SOB.  2.5. Lasix gtt. Pt understands plan of care. Will continue to monitor.

## 2018-04-06 NOTE — PT/OT/SLP PROGRESS
Physical Therapy Treatment    Patient Name:  Levon Arizmendi   MRN:  [id number]    Recommendations:     Discharge Recommendations:  rehabilitation facility   Discharge Equipment Recommendations: walker, rolling, bath bench, bedside commode   Barriers to discharge: Decreased caregiver support    Assessment:     Levon Arizmendi is a 28 y.o. male admitted with a medical diagnosis of Acute decompensated heart failure.  He presents with the following impairments/functional limitations:  gait instability, impaired endurance, impaired balance, pain, impaired functional mobilty, decreased safety awareness, weakness. Pt performed bed mobility SBA and transfers min/mod A with RW. Pt unable to WB through L LE 2* pain. Pt will continue to benefit from skilled PT to improve deficits and increase overall functional mobility. Pt will benefit from IP Rehab to return to PLOF and ensure safety upon d/c.     Rehab Prognosis:  Good; patient would benefit from acute skilled PT services to address these deficits and reach maximum level of function.      Recent Surgery: * No surgery found *      Plan:     During this hospitalization, patient to be seen 4 x/week to address the above listed problems via gait training, therapeutic activities, therapeutic exercises, wheelchair management/training, neuromuscular re-education  · Plan of Care Expires:  04/30/18   Plan of Care Reviewed with: patient    Subjective     Communicated with RN prior to session.  Patient found supine in bed upon PT entry to room, agreeable to treatment.      Chief Complaint: L lower leg pain  Patient comments/goals: return home  Pain/Comfort:  · Pain Rating 1: other (see comments) (pt did not rate pain)  · Location - Side 1: Left  · Location - Orientation 1: lower  · Location 1: leg  · Pain Addressed 1: Reposition, Distraction, Cessation of Activity  · Pain Rating Post-Intervention 1: other (see comments) (pt did not rate pain)    Patients cultural, spiritual, Quaker  conflicts given the current situation: none reported    Objective:     Patient found with: telemetry, peripheral IV     General Precautions: Standard, fall   Orthopedic Precautions:N/A   Braces: N/A     Functional Mobility:  Bed Mobility:     · Supine to Sit: minimum assistance    Transfers:     · Sit to Stand:  minimum assistance with rolling walker  · Bed to Chair: moderate assistance with  rolling walker  using  Stand Pivot  *pt not WB through L LE 2* pain    Gait: pt attempted to take step with R LE with use of RW but unable, not WB through L LE 2* pain      AM-PAC 6 CLICK MOBILITY  Turning over in bed (including adjusting bedclothes, sheets and blankets)?: 3  Sitting down on and standing up from a chair with arms (e.g., wheelchair, bedside commode, etc.): 3  Moving from lying on back to sitting on the side of the bed?: 3  Moving to and from a bed to a chair (including a wheelchair)?: 2  Need to walk in hospital room?: 2  Climbing 3-5 steps with a railing?: 1  Total Score: 14       Therapeutic Activities and Exercises:  Pt sat EOB with SBA/S, vc's for upright posture.  PT applied static stretch to L Achilles tendon for ~30sec hold x2 trials; pt reported mild pain relief  Pt applied lotion and messaged L calf muscle while seated EOB with S; pt reports mild pain relief.  Pt stood EOB with CGA with RW, encouraged to WB through L LE but pt refused.  Pt educated on:  -safety with transfers with RW  -importance of OOB activity  -sitting up in chair for meals  Pt safe to perform transfers with RN staff.     Patient left up in chair with all lines intact, call button in reach and RN notified..    GOALS:    Physical Therapy Goals        Problem: Physical Therapy Goal    Goal Priority Disciplines Outcome Goal Variances Interventions   Physical Therapy Goal     PT/OT, PT Ongoing (interventions implemented as appropriate)     Description:  Goals to be met by: 4/16/2018    Patient will increase functional independence with  mobility by performin. Supine to sit with MInimal Assistance - MET 2018   Revised: supine to sit with Mod (I)  2. Sit to supine with MInimal Assistance - MET 2018   Revised: sit to supine with mod (I)  3. Sit to stand transfer with Minimal Assistance with LRAD - not met  4. Bed to chair transfer with Minimal Assistance using LRAD - MET 2018   Revised: bed to chair transfer with SBA using LRAD  5. Gait  x 50 feet with Minimal Assistance using LRAD - not met  6. Lower extremity exercise program x15 reps per handout, with supervision - not met                        Time Tracking:     PT Received On: 18  PT Start Time: 0950     PT Stop Time: 1018  PT Total Time (min): 28 min     Billable Minutes: Therapeutic Activity 28    Treatment Type: Treatment  PT/PTA: PT     PTA Visit Number: 0     ALFONZO MOSELEY, PT  2018

## 2018-04-07 NOTE — SUBJECTIVE & OBJECTIVE
Interval History:    Has no acute or overnight complain. Leg pain better with massage. CT Leg + for cellulitis. Persistent leukocytosis noted. Has had mild + fluid gain , +35mL and total negative loss of -30.8L since admission. Current regimen include  5mcg and lasix 20gtt. Want to d/c on Monday. Denies fever,chills or cough.    Continuous Infusions:   DOBUTamine 5 mcg/kg/min (04/06/18 2240)    furosemide (LASIX) 2 mg/mL infusion (non-titrating) 20 mg/hr (04/07/18 0533)     Scheduled Meds:   apixaban  5 mg Oral BID    aspirin  81 mg Oral Daily    gabapentin  400 mg Oral TID    latanoprost  1 drop Left Eye QHS    magnesium oxide  800 mg Oral BID    magnesium oxide  800 mg Oral Once    pantoprazole  40 mg Oral Daily    sodium chloride 0.9%  3 mL Intravenous Q8H    spironolactone  25 mg Oral Daily    vancomycin (VANCOCIN) IVPB  1,000 mg Intravenous Q12H     PRN Meds:acetaminophen, docusate sodium, hydrocodone-acetaminophen 5-325mg    Review of patient's allergies indicates:  No Known Allergies  Objective:     Vital Signs (Most Recent):  Temp: 99.1 °F (37.3 °C) (04/07/18 0520)  Pulse: (!) 122 (04/07/18 0700)  Resp: 18 (04/07/18 0520)  BP: 103/60 (04/07/18 0520)  SpO2: 97 % (04/07/18 0520) Vital Signs (24h Range):  Temp:  [97 °F (36.1 °C)-99.1 °F (37.3 °C)] 99.1 °F (37.3 °C)  Pulse:  [112-131] 122  Resp:  [16-18] 18  SpO2:  [95 %-98 %] 97 %  BP: ()/(39-60) 103/60     Patient Vitals for the past 72 hrs (Last 3 readings):   Weight   04/07/18 0700 107.8 kg (237 lb 10.5 oz)     Body mass index is 42.1 kg/m².      Intake/Output Summary (Last 24 hours) at 04/07/18 1006  Last data filed at 04/07/18 0700   Gross per 24 hour   Intake             2280 ml   Output             2425 ml   Net             -145 ml       Hemodynamic Parameters:       Telemetry:     Physical Exam   Constitutional: He is oriented to person, place, and time.   Neck: No JVD present.   Cardiovascular: Normal rate and regular rhythm.  Exam  reveals no gallop.    No murmur heard.  Pulmonary/Chest: Effort normal and breath sounds normal.   Abdominal: Soft. Bowel sounds are normal.   Musculoskeletal: He exhibits edema (+2 bilateral LE) and tenderness (to the lateral left shin).   Neurological: He is alert and oriented to person, place, and time.   Skin: Skin is warm and dry.   Very wrinkled in LE after diuresis. No evidence of fresh wound   Nursing note and vitals reviewed.           Significant Labs:  CBC:    Recent Labs  Lab 04/05/18  0632 04/06/18 0624 04/07/18  0652   WBC 18.40* 15.60* 15.58*   RBC 4.37* 4.61 4.43*   HGB 10.8* 11.2* 11.1*   HCT 29.6* 32.1* 30.4*    249 194   MCV 68* 70* 69*   MCH 24.7* 24.3* 25.1*   MCHC 36.5* 34.9 36.5*     BNP:  No results for input(s): BNP in the last 168 hours.    Invalid input(s): BNPTRIAGELBLO  CMP:    Recent Labs  Lab 04/05/18  0632 04/06/18 0624 04/07/18  0652   GLU 80 87 85   CALCIUM 8.8 9.2 9.3   ALBUMIN 1.9* 2.0* 2.0*   PROT 6.5 6.9 7.2   * 132* 132*   K 4.1 4.1 4.1   CO2 30* 28 27   CL 91* 93* 93*   BUN 21* 27* 28*   CREATININE 1.0 1.1 1.2   ALKPHOS 77 92 95   ALT 32 37 37   AST 43* 45* 42*   BILITOT 6.6* 6.8* 6.7*      Coagulation:   No results for input(s): PT, INR, APTT in the last 168 hours.  LDH:  No results for input(s): LDH in the last 72 hours.  Microbiology:  Microbiology Results (last 7 days)     Procedure Component Value Units Date/Time    Blood culture [[id number]] Collected:  04/05/18 1049    Order Status:  Completed Specimen:  Blood Updated:  04/06/18 1212     Blood Culture, Routine No Growth to date     Blood Culture, Routine No Growth to date    Blood culture [[id number]] Collected:  04/05/18 1048    Order Status:  Completed Specimen:  Blood Updated:  04/06/18 1212     Blood Culture, Routine No Growth to date     Blood Culture, Routine No Growth to date    Urine culture [[id number]] Collected:  03/30/18 0935    Order Status:  Completed Specimen:  Urine from Urine, Catheterized  Updated:  03/31/18 1250     Urine Culture, Routine No growth          I have reviewed all pertinent labs within the past 24 hours.      Estimated Creatinine Clearance: 100.2 mL/min (based on SCr of 1.2 mg/dL).    Diagnostic Results:

## 2018-04-07 NOTE — CARE UPDATE
RN Proactive Rounding Note  Time of Visit:     Admit Date: 3/30/2018  LOS: 7  Code Status: Full Code   Date of Visit: 2018  : 1990  Age: 28 y.o.  Sex: male  Bed: 384/384 A:   MRN: [id number]  Was the patient discharged from an ICU this admission? no   Was the patient discharged from a PACU within last 24 hours? no  Did the patient receive conscious sedation/general anesthesia in last 24 hours? no  Was the patient in the ED within the past 24 hours? no  Was the patient started on NIPPV within the past 24 hours? no  Attending Physician: Jose J Cheney MD  Primary Service: Networked reference to record PCT       ASSESSMENT:     Abnormal Vital Signs: Elevated HR, decreased BP  Clinical Issues: Circulatory     INTERVENTIONS/ RECOMMENDATIONS:     Pt with chronic heart failure without advanced options. Hypotension and tachycardia in the presence of volume overload. Dobutamine and lasix both increased today to increase diuresis. Will monitor response. Will continue to follow patient.    Discussed plan of care with MARC Avendaño    PHYSICIAN ESCALATION:     Yes/No no    Orders received and case discussed with Dr gaviria     Disposition: remain on CSU    FOLLOW-UP/CONTINGENCY:       Call back the Rapid Response Nurse at x [phone]  for additional questions or concerns

## 2018-04-07 NOTE — ASSESSMENT & PLAN NOTE
[place] [facility] [place]  -Continue Apixaban 5mg BID   -Repeat CT PE study (done before transfer) negative

## 2018-04-07 NOTE — ASSESSMENT & PLAN NOTE
- NICMP with significant congestion (T bili at 6.7)  - Not having significant diuresis since yesterday despite regimen change by increasing  and lasix.  -Continue  gtt at 5mcg as current  -increase lasix 30gtt and add metolazone x1 due to decreased Diuresis  -Strict I/Os and Daily weights  -Continue spironolactone 25mg daily. Valsartan and aldactone held due to hypotension  -Holding BB in setting of  gtt   -PT / OT consulted for aggressive ambulation   -Currently not planning for any invasive procedures so restarted apixaban for recent PE  -Not likely candidate for advanced options due to poor social situation/support.

## 2018-04-07 NOTE — PLAN OF CARE
Problem: Patient Care Overview  Goal: Plan of Care Review  Outcome: Ongoing (interventions implemented as appropriate)  Dobutamine and Lasix gtt infusing. Plan of care discussed with patient, no questions at this time. Fall pre-cautions in place. Will continue to monitor.

## 2018-04-07 NOTE — ASSESSMENT & PLAN NOTE
- CT left leg positive for cellulitic changes.   - Positive for pain and leukocytosis  - start vancomycin and monitor trough

## 2018-04-07 NOTE — PROGRESS NOTES
[facility]  Heart Transplant  Progress Note    Patient Name: Levon Arizmendi  MRN: [id number]  Admission Date: 3/30/2018  Hospital Length of Stay: 8 days  Attending Physician: Jose J Cheney MD  Primary Care Provider: Shane Alonso MD  Principal Problem:Acute decompensated heart failure    Subjective:     Interval History:    Has no acute or overnight complain. Leg pain better with massage. CT Leg + for cellulitis. Persistent leukocytosis noted. Has had mild + fluid gain , +35mL and total negative loss of -30.8L since admission. Current regimen include  5mcg and lasix 20gtt. Want to d/c on Monday. Denies fever,chills or cough.    Continuous Infusions:   DOBUTamine 5 mcg/kg/min (04/06/18 2240)    furosemide (LASIX) 2 mg/mL infusion (non-titrating) 20 mg/hr (04/07/18 0533)     Scheduled Meds:   apixaban  5 mg Oral BID    aspirin  81 mg Oral Daily    gabapentin  400 mg Oral TID    latanoprost  1 drop Left Eye QHS    magnesium oxide  800 mg Oral BID    magnesium oxide  800 mg Oral Once    pantoprazole  40 mg Oral Daily    sodium chloride 0.9%  3 mL Intravenous Q8H    spironolactone  25 mg Oral Daily    vancomycin (VANCOCIN) IVPB  1,000 mg Intravenous Q12H     PRN Meds:acetaminophen, docusate sodium, hydrocodone-acetaminophen 5-325mg    Review of patient's allergies indicates:  No Known Allergies  Objective:     Vital Signs (Most Recent):  Temp: 99.1 °F (37.3 °C) (04/07/18 0520)  Pulse: (!) 122 (04/07/18 0700)  Resp: 18 (04/07/18 0520)  BP: 103/60 (04/07/18 0520)  SpO2: 97 % (04/07/18 0520) Vital Signs (24h Range):  Temp:  [97 °F (36.1 °C)-99.1 °F (37.3 °C)] 99.1 °F (37.3 °C)  Pulse:  [112-131] 122  Resp:  [16-18] 18  SpO2:  [95 %-98 %] 97 %  BP: ()/(39-60) 103/60     Patient Vitals for the past 72 hrs (Last 3 readings):   Weight   04/07/18 0700 107.8 kg (237 lb 10.5 oz)     Body mass index is 42.1 kg/m².      Intake/Output Summary (Last 24 hours) at 04/07/18 1006  Last  data filed at 04/07/18 0700   Gross per 24 hour   Intake             2280 ml   Output             2425 ml   Net             -145 ml       Hemodynamic Parameters:       Telemetry:     Physical Exam   Constitutional: He is oriented to person, place, and time.   Neck: No JVD present.   Cardiovascular: Normal rate and regular rhythm.  Exam reveals no gallop.    No murmur heard.  Pulmonary/Chest: Effort normal and breath sounds normal.   Abdominal: Soft. Bowel sounds are normal.   Musculoskeletal: He exhibits edema (+2 bilateral LE) and tenderness (to the lateral left shin).   Neurological: He is alert and oriented to person, place, and time.   Skin: Skin is warm and dry.   Very wrinkled in LE after diuresis. No evidence of fresh wound   Nursing note and vitals reviewed.           Significant Labs:  CBC:    Recent Labs  Lab 04/05/18  0632 04/06/18  0624 04/07/18  0652   WBC 18.40* 15.60* 15.58*   RBC 4.37* 4.61 4.43*   HGB 10.8* 11.2* 11.1*   HCT 29.6* 32.1* 30.4*    249 194   MCV 68* 70* 69*   MCH 24.7* 24.3* 25.1*   MCHC 36.5* 34.9 36.5*     BNP:  No results for input(s): BNP in the last 168 hours.    Invalid input(s): BNPTRIAGELBLO  CMP:    Recent Labs  Lab 04/05/18  0632 04/06/18  0624 04/07/18  0652   GLU 80 87 85   CALCIUM 8.8 9.2 9.3   ALBUMIN 1.9* 2.0* 2.0*   PROT 6.5 6.9 7.2   * 132* 132*   K 4.1 4.1 4.1   CO2 30* 28 27   CL 91* 93* 93*   BUN 21* 27* 28*   CREATININE 1.0 1.1 1.2   ALKPHOS 77 92 95   ALT 32 37 37   AST 43* 45* 42*   BILITOT 6.6* 6.8* 6.7*      Coagulation:   No results for input(s): PT, INR, APTT in the last 168 hours.  LDH:  No results for input(s): LDH in the last 72 hours.  Microbiology:  Microbiology Results (last 7 days)     Procedure Component Value Units Date/Time    Blood culture [[id number]] Collected:  04/05/18 1049    Order Status:  Completed Specimen:  Blood Updated:  04/06/18 1212     Blood Culture, Routine No Growth to date     Blood Culture, Routine No Growth to date     Blood culture [[id number]] Collected:  04/05/18 1048    Order Status:  Completed Specimen:  Blood Updated:  04/06/18 1212     Blood Culture, Routine No Growth to date     Blood Culture, Routine No Growth to date    Urine culture [[id number]] Collected:  03/30/18 0935    Order Status:  Completed Specimen:  Urine from Urine, Catheterized Updated:  03/31/18 1250     Urine Culture, Routine No growth          I have reviewed all pertinent labs within the past 24 hours.      Estimated Creatinine Clearance: 100.2 mL/min (based on SCr of 1.2 mg/dL).    Diagnostic Results:      Assessment and Plan:     No notes on file    * Acute decompensated heart failure    - NICMP with significant congestion (T bili at 6.7)  - Not having significant diuresis since yesterday despite regimen change by increasing  and lasix.  -Continue  gtt at 5mcg as current  -increase lasix 30gtt and add metolazone x1 due to decreased Diuresis  -Strict I/Os and Daily weights  -Continue spironolactone 25mg daily. Valsartan and aldactone held due to hypotension  -Holding BB in setting of  gtt   -PT / OT consulted for aggressive ambulation   -Currently not planning for any invasive procedures so restarted apixaban for recent PE  -Not likely candidate for advanced options due to poor social situation/support.         Acute left ankle pain    - CT left leg positive for cellulitic changes.   - Positive for pain and leukocytosis  - start vancomycin and monitor trough        History of pulmonary embolism    -[place] [facility] [place]  -Continue Apixaban 5mg BID   -Repeat CT PE study (done before transfer) negative          Discussed plan of care with Dr.Biglane Luis Diaz MD  Heart Transplant  [facility]

## 2018-04-07 NOTE — PLAN OF CARE
Problem: Patient Care Overview  Goal: Plan of Care Review  Outcome: Ongoing (interventions implemented as appropriate)  PT is AAOX3, no acute changes noted during shift, pt has been on bedrest during shift and repositioned q2, VSS ex bp has been low, MD aware of pt status. No skin breakdown noted, No falls noted. Fall precautions remain Pain assessed. Pain is controlled with repositioning and resting,   Pt resting comfortable in bed. Call light in reach. Will continue to monitor.

## 2018-04-07 NOTE — HOSPITAL COURSE
Patient was admitted to [facility] and diuresed aggressively with lasix gtt and diuril.  was added for diuresis and low output. His apixaban was continued for history of PE. Liver US done unremarkable in setting of elevated bilirubin. Was down trending at time of discharge. GDMT with aldactone 25 mg daily. No addition of ACE due to hypotension. Patient recently was evicted and unfortunately possibly homeless therefore we were unable to offer LVAD or OHTx. He will discharge to go locally weekly to care point with  infusion.

## 2018-04-08 PROBLEM — R74.01 TRANSAMINITIS: Status: ACTIVE | Noted: 2018-01-01

## 2018-04-08 NOTE — ASSESSMENT & PLAN NOTE
- CT left leg positive for cellulitic changes.   - Positive for pain and leukocytosis  - continue vancomycin. Trough tomorrow. Improved sxs

## 2018-04-08 NOTE — ASSESSMENT & PLAN NOTE
- possibly from hepatic congestion but worsening with Diuresis  - US yesterday normal for gall bladder and liver  - talked to hepatology. They want Hep panel added and they will see patient tomorrow

## 2018-04-08 NOTE — PROGRESS NOTES
Attestation:  I have seen the patient, reviewed the Hospitalist's assessment and plan. I have personally interviewed and examined the patient at bedside and agree with the findings.      Pt appears near euvolemia. Will hold metolazone today and likely start weaning down his lasix and  starting tomorrow  - Cont inotropes and lasix gtt for now  - He is not an advanced options candidate due to both social/caregiving concerns as well as medical comorbid conditions.   - maximize GDMT  - RUQ u/s today negative; T bili still rising--> will consult GI/hepatology  - Leukocytosis improving with Vancomycin for LLE cellulitis              - CT c/w left leg cellulitis              - Cultures NGTD  - Hope to d/c later this week on GDMT     Jose J Cheney MD  [facility]  Heart Transplant  Progress Note    Patient Name: Levon Arizmendi  MRN: [id number]  Admission Date: 3/30/2018  Hospital Length of Stay: 9 days  Attending Physician: Jose J Cheney MD  Primary Care Provider: Shane Alonso MD  Principal Problem:Acute decompensated heart failure    Subjective:     Interval History:     Has no complain this morning. Leg pain improved with abx (vanco). Lft noted to be trending up despite good diuresis. Renal function mildly worsened after metolazone yesterday (1.2--> 1.4) . Net negative 3L for 24 hrs and total 34L since admission.    Continuous Infusions:   DOBUTamine 5 mcg/kg/min (04/08/18 0830)    furosemide (LASIX) 2 mg/mL infusion (non-titrating) 30 mg/hr (04/08/18 0618)     Scheduled Meds:   apixaban  5 mg Oral BID    aspirin  81 mg Oral Daily    gabapentin  400 mg Oral TID    latanoprost  1 drop Left Eye QHS    magnesium oxide  800 mg Oral BID    magnesium oxide  800 mg Oral Once    pantoprazole  40 mg Oral Daily    sodium chloride 0.9%  3 mL Intravenous Q8H    spironolactone  25 mg Oral Daily    vancomycin (VANCOCIN) IVPB  1,000 mg Intravenous Q12H      PRN Meds:acetaminophen, docusate sodium, hydrocodone-acetaminophen 5-325mg    Review of patient's allergies indicates:  No Known Allergies  Objective:     Vital Signs (Most Recent):  Temp: 98.2 °F (36.8 °C) (04/08/18 0727)  Pulse: (!) 126 (04/08/18 1000)  Resp: 18 (04/08/18 0727)  BP: (!) 86/51 (04/08/18 0727)  SpO2: 95 % (04/08/18 0727) Vital Signs (24h Range):  Temp:  [98.1 °F (36.7 °C)-99.9 °F (37.7 °C)] 98.2 °F (36.8 °C)  Pulse:  [110-128] 126  Resp:  [16-18] 18  SpO2:  [92 %-97 %] 95 %  BP: ()/(44-78) 86/51     Patient Vitals for the past 72 hrs (Last 3 readings):   Weight   04/08/18 0727 90.8 kg (200 lb 2.8 oz)   04/07/18 0700 107.8 kg (237 lb 10.5 oz)     Body mass index is 35.46 kg/m².      Intake/Output Summary (Last 24 hours) at 04/08/18 1125  Last data filed at 04/08/18 0600   Gross per 24 hour   Intake              960 ml   Output             3927 ml   Net            -2967 ml       Hemodynamic Parameters:       Telemetry:     Physical Exam     Constitutional: He is oriented to person, place, and time.   Neck: No JVD present.   Cardiovascular: Normal rate and regular rhythm.  Exam reveals no gallop.    No murmur heard.  Pulmonary/Chest: Effort normal and breath sounds normal.   Abdominal: Soft. Bowel sounds are normal.   Musculoskeletal: He exhibits +1 edema and tenderness to the left proximal lateral shin.   Neurological: He is alert and oriented to person, place, and time.   Skin: Skin is warm and dry. Very wrinkled and scaling off on the left lateral aspect.   Nursing note and vitals reviewed.         Significant Labs:  CBC:    Recent Labs  Lab 04/06/18  0624 04/07/18  0652 04/08/18  0646   WBC 15.60* 15.58* 13.65*   RBC 4.61 4.43* 4.49*   HGB 11.2* 11.1* 11.4*   HCT 32.1* 30.4* 31.3*    194 224   MCV 70* 69* 70*   MCH 24.3* 25.1* 25.4*   MCHC 34.9 36.5* 36.4*     BNP:  No results for input(s): BNP in the last 168 hours.    Invalid input(s): BNPTRIAGELBLO  CMP:    Recent Labs  Lab  04/06/18  0624 04/07/18  0652 04/08/18  0646   GLU 87 85 96   CALCIUM 9.2 9.3 9.4   ALBUMIN 2.0* 2.0* 2.0*   PROT 6.9 7.2 7.3   * 132* 132*   K 4.1 4.1 4.1   CO2 28 27 28   CL 93* 93* 93*   BUN 27* 28* 34*   CREATININE 1.1 1.2 1.4   ALKPHOS 92 95 105   ALT 37 37 35   AST 45* 42* 45*   BILITOT 6.8* 6.7* 7.0*      Coagulation:   No results for input(s): PT, INR, APTT in the last 168 hours.  LDH:  No results for input(s): LDH in the last 72 hours.  Microbiology:  Microbiology Results (last 7 days)     Procedure Component Value Units Date/Time    Blood culture [[id number]] Collected:  04/05/18 1048    Order Status:  Completed Specimen:  Blood Updated:  04/07/18 1212     Blood Culture, Routine No Growth to date     Blood Culture, Routine No Growth to date     Blood Culture, Routine No Growth to date    Blood culture [[id number]] Collected:  04/05/18 1049    Order Status:  Completed Specimen:  Blood Updated:  04/07/18 1212     Blood Culture, Routine No Growth to date     Blood Culture, Routine No Growth to date     Blood Culture, Routine No Growth to date          I have reviewed all pertinent labs within the past 24 hours.    Estimated Creatinine Clearance: 78.3 mL/min (based on SCr of 1.4 mg/dL).    Diagnostic Results:        Assessment and Plan:     No notes on file    * Acute decompensated heart failure    - NICMP with significant congestion (T bili at 7)  - Not having significant diuresis since yesterday despite regimen change by increasing  and lasix.  -Continue  gtt at 5mcg as current  - Continue lasix 30gtt. Hold metolazone for today due to worsening RF  -Strict I/Os and Daily weights  -Valsartan and aldactone held due to hypotension  -Holding BB in setting of  gtt   -PT / OT consulted for aggressive ambulation   -Currently not planning for any invasive procedures so restarted apixaban for recent PE  -Not likely candidate for advanced options due to poor social situation/support.         Acute left  ankle pain    - CT left leg positive for cellulitic changes.   - Positive for pain and leukocytosis  - continue vancomycin. Trough tomorrow. Improved sxs          Transaminitis    - possibly from hepatic congestion but worsening with Diuresis  - US yesterday normal for gall bladder and liver  - talked to hepatology. They want Hep panel added and they will see patient tomorrow        History of pulmonary embolism    -[place] [facility] [place] [facility]  -Continue Apixaban 5mg BID   -Repeat CT PE study (done before transfer) negative          Discussed plan of care with Dr. Shravan Diaz MD  Heart Transplant  [facility]

## 2018-04-08 NOTE — SUBJECTIVE & OBJECTIVE
Interval History:     Has no complain this morning. Leg pain improved with abx (vanco). Lft noted to be trending up despite good diuresis. Renal function mildly worsened after metolazone yesterday (1.2--> 1.4) . Net negative 3L for 24 hrs and total 34L since admission.    Continuous Infusions:   DOBUTamine 5 mcg/kg/min (04/08/18 0830)    furosemide (LASIX) 2 mg/mL infusion (non-titrating) 30 mg/hr (04/08/18 0618)     Scheduled Meds:   apixaban  5 mg Oral BID    aspirin  81 mg Oral Daily    gabapentin  400 mg Oral TID    latanoprost  1 drop Left Eye QHS    magnesium oxide  800 mg Oral BID    magnesium oxide  800 mg Oral Once    pantoprazole  40 mg Oral Daily    sodium chloride 0.9%  3 mL Intravenous Q8H    spironolactone  25 mg Oral Daily    vancomycin (VANCOCIN) IVPB  1,000 mg Intravenous Q12H     PRN Meds:acetaminophen, docusate sodium, hydrocodone-acetaminophen 5-325mg    Review of patient's allergies indicates:  No Known Allergies  Objective:     Vital Signs (Most Recent):  Temp: 98.2 °F (36.8 °C) (04/08/18 0727)  Pulse: (!) 126 (04/08/18 1000)  Resp: 18 (04/08/18 0727)  BP: (!) 86/51 (04/08/18 0727)  SpO2: 95 % (04/08/18 0727) Vital Signs (24h Range):  Temp:  [98.1 °F (36.7 °C)-99.9 °F (37.7 °C)] 98.2 °F (36.8 °C)  Pulse:  [110-128] 126  Resp:  [16-18] 18  SpO2:  [92 %-97 %] 95 %  BP: ()/(44-78) 86/51     Patient Vitals for the past 72 hrs (Last 3 readings):   Weight   04/08/18 0727 90.8 kg (200 lb 2.8 oz)   04/07/18 0700 107.8 kg (237 lb 10.5 oz)     Body mass index is 35.46 kg/m².      Intake/Output Summary (Last 24 hours) at 04/08/18 1125  Last data filed at 04/08/18 0600   Gross per 24 hour   Intake              960 ml   Output             3927 ml   Net            -2967 ml       Hemodynamic Parameters:       Telemetry:     Physical Exam     Constitutional: He is oriented to person, place, and time.   Neck: No JVD present.   Cardiovascular: Normal rate and regular rhythm.  Exam reveals no  gallop.    No murmur heard.  Pulmonary/Chest: Effort normal and breath sounds normal.   Abdominal: Soft. Bowel sounds are normal.   Musculoskeletal: He exhibits +1 edema and tenderness to the left proximal lateral shin.   Neurological: He is alert and oriented to person, place, and time.   Skin: Skin is warm and dry. Very wrinkled and scaling off on the left lateral aspect.   Nursing note and vitals reviewed.         Significant Labs:  CBC:    Recent Labs  Lab 04/06/18 0624 04/07/18  0652 04/08/18  0646   WBC 15.60* 15.58* 13.65*   RBC 4.61 4.43* 4.49*   HGB 11.2* 11.1* 11.4*   HCT 32.1* 30.4* 31.3*    194 224   MCV 70* 69* 70*   MCH 24.3* 25.1* 25.4*   MCHC 34.9 36.5* 36.4*     BNP:  No results for input(s): BNP in the last 168 hours.    Invalid input(s): BNPTRIAGELBLO  CMP:    Recent Labs  Lab 04/06/18 0624 04/07/18  0652 04/08/18  0646   GLU 87 85 96   CALCIUM 9.2 9.3 9.4   ALBUMIN 2.0* 2.0* 2.0*   PROT 6.9 7.2 7.3   * 132* 132*   K 4.1 4.1 4.1   CO2 28 27 28   CL 93* 93* 93*   BUN 27* 28* 34*   CREATININE 1.1 1.2 1.4   ALKPHOS 92 95 105   ALT 37 37 35   AST 45* 42* 45*   BILITOT 6.8* 6.7* 7.0*      Coagulation:   No results for input(s): PT, INR, APTT in the last 168 hours.  LDH:  No results for input(s): LDH in the last 72 hours.  Microbiology:  Microbiology Results (last 7 days)     Procedure Component Value Units Date/Time    Blood culture [[id number]] Collected:  04/05/18 1048    Order Status:  Completed Specimen:  Blood Updated:  04/07/18 1212     Blood Culture, Routine No Growth to date     Blood Culture, Routine No Growth to date     Blood Culture, Routine No Growth to date    Blood culture [[id number]] Collected:  04/05/18 1049    Order Status:  Completed Specimen:  Blood Updated:  04/07/18 1212     Blood Culture, Routine No Growth to date     Blood Culture, Routine No Growth to date     Blood Culture, Routine No Growth to date          I have reviewed all pertinent labs within the past 24  hours.    Estimated Creatinine Clearance: 78.3 mL/min (based on SCr of 1.4 mg/dL).    Diagnostic Results:

## 2018-04-08 NOTE — ASSESSMENT & PLAN NOTE
- NICMP with significant congestion (T bili at 7)  - Not having significant diuresis since yesterday despite regimen change by increasing  and lasix.  -Continue  gtt at 5mcg as current  - Continue lasix 30gtt. Hold metolazone for today due to worsening RF  -Strict I/Os and Daily weights  -Valsartan and aldactone held due to hypotension  -Holding BB in setting of  gtt   -PT / OT consulted for aggressive ambulation   -Currently not planning for any invasive procedures so restarted apixaban for recent PE  -Not likely candidate for advanced options due to poor social situation/support.

## 2018-04-08 NOTE — PLAN OF CARE
Problem: Patient Care Overview  Goal: Plan of Care Review  Outcome: Ongoing (interventions implemented as appropriate)  Patient remained free from falls/injury/trauma throughout shift. No complaints of chest pain, SOB, or discomfort. BP's were in the 80's systolic and HTS on call notified. Patient asymptomatic.  and lasix gtt increased (5 mcg and 30 mg/hr respectively). Vancomycin given. Plan of care reviewed with patient. Patient verbalized understanding. All questions encouraged and answered. Will continue to monitor.

## 2018-04-09 NOTE — PT/OT/SLP PROGRESS
"Physical Therapy Treatment    Patient Name:  Levon Arizmendi   MRN:  [id number]    Recommendations:     Discharge Recommendations:  home health PT   Discharge Equipment Recommendations: bedside commode, bath bench, walker, rolling, wheelchair   Barriers to discharge: Decreased caregiver support    Assessment:     Levon Arizmendi is a 28 y.o. male admitted with a medical diagnosis of Acute decompensated heart failure.  He presents with the following impairments/functional limitations:  weakness, impaired endurance, impaired self care skills, impaired functional mobilty, gait instability, impaired balance, pain . Pt continues to be limited t/o sessions by LLE pain. He was however able to ambulate short distances today w/ RW. Pt is progressing well and will continue PT POC. Home w/ Home Health is recommended upon D/C due to current level of function.    Rehab Prognosis:  Good; patient would benefit from acute skilled PT services to address these deficits and reach maximum level of function.      Recent Surgery: * No surgery found *      Plan:     During this hospitalization, patient to be seen 4 x/week to address the above listed problems via gait training, therapeutic activities, therapeutic exercises  · Plan of Care Expires:  04/30/18   Plan of Care Reviewed with: patient    Subjective     Communicated with nursing prior to session.  Patient found supine upon PT entry to room, agreeable to treatment.      Chief Complaint: LLE pain  Patient comments/goals: "I've been getting up by myself." (referring to transferring to/from bedside chair)  Pain/Comfort:  · Pain Rating 1: 7/10  · Location - Side 1: Left  · Location - Orientation 1: lower  · Location 1: leg  · Pain Addressed 1: Pre-medicate for activity, Reposition, Distraction  · Pain Rating Post-Intervention 1: 7/10    Patients cultural, spiritual, Hindu conflicts given the current situation: none reported    Objective:     Patient found with: telemetry, peripheral IV "     General Precautions: Standard, fall   Orthopedic Precautions:N/A   Braces: N/A     Functional Mobility:  · Bed Mobility:     · Supine<>sit on bed w/ SPV, HOB flat and w/o use of side rail, inc'd time required due to LLE pain  · Scooting in supine to HOB IND, HOB flat and w/o use of side rail  · Transfers:     · Sit<>stand to/from EOB (x2 trials) w/ RW and SBA for safety  · vc's for hand placement  · Gait:   · ~15ft (x2 trials) w/ RW and CGA for safety, limited in distance by LLE pain, no LOB  · vc's for upright posture and safety  · Balance:   · Static stand w/ RW and SBA for safety      AM-PAC 6 CLICK MOBILITY  Turning over in bed (including adjusting bedclothes, sheets and blankets)?: 4  Sitting down on and standing up from a chair with arms (e.g., wheelchair, bedside commode, etc.): 3  Moving from lying on back to sitting on the side of the bed?: 4  Moving to and from a bed to a chair (including a wheelchair)?: 3  Need to walk in hospital room?: 3  Climbing 3-5 steps with a railing?: 2  Total Score: 19       Therapeutic Activities and Exercises:  Supine and seated BLE therex x15 reps (HF, LAQ, AP)  Supine gastroc stretch w/ sheet for active assist, pt instructed on gentle stretch x10s holds, pt verb and demo understanding, pt reported min pain relief w/ stretch    Patient left supine with all lines intact and call button in reach..    GOALS:    Physical Therapy Goals        Problem: Physical Therapy Goal    Goal Priority Disciplines Outcome Goal Variances Interventions   Physical Therapy Goal     PT/OT, PT Ongoing (interventions implemented as appropriate)     Description:  Goals to be met by: 2018    Patient will increase functional independence with mobility by performin. Supine to sit with MInimal Assistance - MET 2018   Revised: supine to sit with Mod (I)  2. Sit to supine with MInimal Assistance - MET 2018   Revised: sit to supine with mod (I)  3. Sit to stand transfer with Minimal  Assistance with LRAD - MET 4/9/2018  4. Bed to chair transfer with Minimal Assistance using LRAD - MET 4/4/2018   Revised: bed to chair transfer with SBA using LRAD  5. Gait  x 50 feet with Minimal Assistance using LRAD - not met  6. Lower extremity exercise program x15 reps per handout, with supervision - not met                         Time Tracking:     PT Received On: 04/09/18  PT Start Time: 0912     PT Stop Time: 0937  PT Total Time (min): 25 min     Billable Minutes: Therapeutic Activity 25 and Total Time 25    Treatment Type: Treatment  PT/PTA: PT     PTA Visit Number: 0     Rosemarie Evans, PT  04/09/2018

## 2018-04-09 NOTE — PLAN OF CARE
[facility]   Heart Transplant Clinic  [address] [place] [address]   [phone] after hours        HOME  HEALTH ORDERS      Admit to Home Health    Diagnosis:   Patient Active Problem List   Diagnosis    Morbid obesity due to excess calories    KAMILLE (obstructive sleep apnea)    Essential hypertension    Organ transplant candidate    Chronic systolic congestive heart failure, NYHA class 3    COCM (congestive cardiomyopathy)    Acute decompensated heart failure    Acute pulmonary embolism    History of pulmonary embolism    Acute left ankle pain    Transaminitis       Patient is homebound due to: Home inotropes    Diet: Cardiac    Acitivities: Ad Tara    Nursing:   SN to complete comprehensive assessment including routine vital signs. Instruct on disease process and s/s of complications to report to MD. Review/verify medication list sent home with the patient at time of discharge  and instruct patient/caregiver as needed. Frequency may be adjusted depending on start of care date.    Notify MD if SBP > 160 or < 90; DBP > 90 or < 50; HR > 120 or < 50; Temp > 101; Weight gain >3lbs in 1 day or 5lbs in 1 week.    LABS:  SN to perform labs: CMP, Mg q friday      HOME INFUSION THERAPY:  SN to perform Infusion Therapy/Central Line Care.  Review Central Line Care & Central Line Flush with patient.    Administer (drug and dose): IV Dobutamine 5mcg/kg/min, continuous, no stop date. Dosing weight 108.5 kgs      **For questions or concerns, please call [phone] and ask for Pre-Heart transplant clinic, M-F 8-5. After hours, weekends, call [phone] and ask for the Heart Transplant Cardiologist on call.**    Central line care:  Scrub the Hub: Prior to accessing the line, always perform a 30 second alcohol scrub  Each lumen of the central line is to be flushed at least daily with 10 mL Normal Saline and 3 mL Heparin flush (100 units/mL)  Skilled Nurse (SN)  may draw blood from IV access  Blood Draw Procedure:   - Aspirate at least 5 mL of blood   - Discard   - Obtain specimen   - Change posiflow cap   - Flush with 20 mL Normal Saline followed by a                 3-5 mL Heparin flush (100 units/mL)  Central :   - Sterile dressing changes are done weekly and as needed.   - Use chlor-hexadine scrub to cleanse site, apply Biopatch to insertion site,       apply securement device dressing   - Posi-flow caps are changed weekly and after EVERY lab draw.   - If sterile gauze is under dressing to control oozing,                 dressing change must be performed every 24 hours until gauze is not needed.    Send initial Home Health orders to HTS attending physician on call.  Send follow up questions to [phone] or fax:                   Heart Failure:      [phone]

## 2018-04-09 NOTE — HPI
This is a 29 yo M with NICM HFrEF-EF 20% s/p Post [place] Sci dcICD (12/6/2017), KAMILLE, obesity, HTN, HLD who was transferred from [facility] for ADHF and cardiogenic shock. Hepatology consulted for elevated liver enzymes. Since 3/30 patient has had an elevated TB of 5. This has stayed about the same, fluctuating between 5-7. His AST/ALT are mildly elevated. Patient denies any history of known liver disease. He denies any alcohol use, IVDU, tattoos, or family history of liver disease. He has not recently taken any hepatotoxic medications or herbs. He has been diuresing well since being here, putting out ~40L.

## 2018-04-09 NOTE — SUBJECTIVE & OBJECTIVE
Interval History: No complaints. Working on plan for where to live/stay once discharged. May stay with brother.    Continuous Infusions:   DOBUTamine 5 mcg/kg/min (04/08/18 2314)    furosemide (LASIX) 2 mg/mL infusion (non-titrating) 30 mg/hr (04/09/18 0719)     Scheduled Meds:   apixaban  5 mg Oral BID    aspirin  81 mg Oral Daily    gabapentin  400 mg Oral TID    latanoprost  1 drop Left Eye QHS    magnesium oxide  800 mg Oral BID    magnesium oxide  800 mg Oral Once    pantoprazole  40 mg Oral Daily    sodium chloride 0.9%  3 mL Intravenous Q8H    spironolactone  25 mg Oral Daily    vancomycin (VANCOCIN) IVPB  750 mg Intravenous Q12H     PRN Meds:acetaminophen, docusate sodium, hydrocodone-acetaminophen 5-325mg    Review of patient's allergies indicates:  No Known Allergies  Objective:     Vital Signs (Most Recent):  Temp: 97.9 °F (36.6 °C) (04/09/18 1250)  Pulse: (!) 114 (04/09/18 1250)  Resp: 18 (04/09/18 1250)  BP: (!) 101/55 (04/09/18 1250)  SpO2: 95 % (04/09/18 1250) Vital Signs (24h Range):  Temp:  [97 °F (36.1 °C)-98.3 °F (36.8 °C)] 97.9 °F (36.6 °C)  Pulse:  [104-122] 114  Resp:  [18] 18  SpO2:  [95 %-98 %] 95 %  BP: ()/(51-57) 101/55     Patient Vitals for the past 72 hrs (Last 3 readings):   Weight   04/09/18 0903 88 kg (194 lb 0.1 oz)   04/08/18 0727 90.8 kg (200 lb 2.8 oz)   04/07/18 0700 107.8 kg (237 lb 10.5 oz)     Body mass index is 34.37 kg/m².      Intake/Output Summary (Last 24 hours) at 04/09/18 1542  Last data filed at 04/09/18 1321   Gross per 24 hour   Intake              700 ml   Output             3150 ml   Net            -2450 ml     Hemodynamic Parameters:    Telemetry: NSVT    Physical Exam  Constitutional: He is oriented to person, place, and time.   Neck: JVD mid neck, improving  Cardiovascular: Normal rate and regular rhythm.  Exam reveals no gallop.    No murmur heard.  Pulmonary/Chest: Effort normal and breath sounds normal.   Abdominal: Soft. Bowel sounds are  normal.   Musculoskeletal: He exhibits +1 edema and tenderness to the left proximal lateral shin.   Neurological: He is alert and oriented to person, place, and time.   Skin: Skin is warm and dry. Very wrinkled and scaling off on the left lateral aspect.   Nursing note and vitals reviewed.     Significant Labs:  CBC:    Recent Labs  Lab 04/07/18  0652 04/08/18  0646 04/09/18  0941   WBC 15.58* 13.65* 10.89   RBC 4.43* 4.49* 5.20   HGB 11.1* 11.4* 12.9*   HCT 30.4* 31.3* 36.7*    224 275   MCV 69* 70* 71*   MCH 25.1* 25.4* 24.8*   MCHC 36.5* 36.4* 35.1     BNP:    Recent Labs  Lab 04/09/18  0941   *     CMP:    Recent Labs  Lab 04/07/18  0652 04/08/18  0646 04/09/18  0746   GLU 85 96 77   CALCIUM 9.3 9.4 9.8   ALBUMIN 2.0* 2.0* 2.2*   PROT 7.2 7.3 7.6   * 132* 131*   K 4.1 4.1 3.9   CO2 27 28 27   CL 93* 93* 88*   BUN 28* 34* 37*   CREATININE 1.2 1.4 1.5*   ALKPHOS 95 105 95   ALT 37 35 35   AST 42* 45* 41*   BILITOT 6.7* 7.0* 6.3*      Coagulation:   No results for input(s): PT, INR, APTT in the last 168 hours.  LDH:  No results for input(s): LDH in the last 72 hours.  Microbiology:  Microbiology Results (last 7 days)     Procedure Component Value Units Date/Time    Blood culture [[id number]] Collected:  04/05/18 1048    Order Status:  Completed Specimen:  Blood Updated:  04/07/18 1212     Blood Culture, Routine No Growth to date     Blood Culture, Routine No Growth to date     Blood Culture, Routine No Growth to date    Blood culture [[id number]] Collected:  04/05/18 1049    Order Status:  Completed Specimen:  Blood Updated:  04/07/18 1212     Blood Culture, Routine No Growth to date     Blood Culture, Routine No Growth to date     Blood Culture, Routine No Growth to date          I have reviewed all pertinent labs within the past 24 hours.    Estimated Creatinine Clearance: 71.9 mL/min (A) (based on SCr of 1.5 mg/dL (H)).    Diagnostic Results:

## 2018-04-09 NOTE — NURSING
Notified Dr. Huong MD of patient's vanc trough of 26.6. Ordered to hold night time (4th dose) of vancomycin. Will follow orders and continue to monitor.

## 2018-04-09 NOTE — NURSING
Patient refused AM labs this morning. When I tried to go in and talk to him about importance of labs. He refused to speak to me. Tried to phone HTS on call, but did not receive an answer. Will relay message to day shift nurse. Will continue to monitor.

## 2018-04-09 NOTE — PROGRESS NOTES
[facility]  Heart Transplant  Progress Note    Patient Name: Levon Arizmendi  MRN: [id number]  Admission Date: 3/30/2018  Hospital Length of Stay: 10 days  Attending Physician: Jose J Cheney MD  Primary Care Provider: Shane Alonso MD  Principal Problem:Acute decompensated heart failure    Subjective:     Interval History: No complaints. Working on plan for where to live/stay once discharged. May stay with brother.    Continuous Infusions:   DOBUTamine 5 mcg/kg/min (04/08/18 2314)    furosemide (LASIX) 2 mg/mL infusion (non-titrating) 30 mg/hr (04/09/18 0719)     Scheduled Meds:   apixaban  5 mg Oral BID    aspirin  81 mg Oral Daily    gabapentin  400 mg Oral TID    latanoprost  1 drop Left Eye QHS    magnesium oxide  800 mg Oral BID    magnesium oxide  800 mg Oral Once    pantoprazole  40 mg Oral Daily    sodium chloride 0.9%  3 mL Intravenous Q8H    spironolactone  25 mg Oral Daily    vancomycin (VANCOCIN) IVPB  750 mg Intravenous Q12H     PRN Meds:acetaminophen, docusate sodium, hydrocodone-acetaminophen 5-325mg    Review of patient's allergies indicates:  No Known Allergies  Objective:     Vital Signs (Most Recent):  Temp: 97.9 °F (36.6 °C) (04/09/18 1250)  Pulse: (!) 114 (04/09/18 1250)  Resp: 18 (04/09/18 1250)  BP: (!) 101/55 (04/09/18 1250)  SpO2: 95 % (04/09/18 1250) Vital Signs (24h Range):  Temp:  [97 °F (36.1 °C)-98.3 °F (36.8 °C)] 97.9 °F (36.6 °C)  Pulse:  [104-122] 114  Resp:  [18] 18  SpO2:  [95 %-98 %] 95 %  BP: ()/(51-57) 101/55     Patient Vitals for the past 72 hrs (Last 3 readings):   Weight   04/09/18 0903 88 kg (194 lb 0.1 oz)   04/08/18 0727 90.8 kg (200 lb 2.8 oz)   04/07/18 0700 107.8 kg (237 lb 10.5 oz)     Body mass index is 34.37 kg/m².      Intake/Output Summary (Last 24 hours) at 04/09/18 1542  Last data filed at 04/09/18 1321   Gross per 24 hour   Intake              700 ml   Output             3150 ml   Net            -2450 ml      Hemodynamic Parameters:    Telemetry: NSVT    Physical Exam  Constitutional: He is oriented to person, place, and time.   Neck: JVD mid neck, improving  Cardiovascular: Normal rate and regular rhythm.  Exam reveals no gallop.    No murmur heard.  Pulmonary/Chest: Effort normal and breath sounds normal.   Abdominal: Soft. Bowel sounds are normal.   Musculoskeletal: He exhibits +1 edema and tenderness to the left proximal lateral shin.   Neurological: He is alert and oriented to person, place, and time.   Skin: Skin is warm and dry. Very wrinkled and scaling off on the left lateral aspect.   Nursing note and vitals reviewed.     Significant Labs:  CBC:    Recent Labs  Lab 04/07/18  0652 04/08/18  0646 04/09/18  0941   WBC 15.58* 13.65* 10.89   RBC 4.43* 4.49* 5.20   HGB 11.1* 11.4* 12.9*   HCT 30.4* 31.3* 36.7*    224 275   MCV 69* 70* 71*   MCH 25.1* 25.4* 24.8*   MCHC 36.5* 36.4* 35.1     BNP:    Recent Labs  Lab 04/09/18  0941   *     CMP:    Recent Labs  Lab 04/07/18  0652 04/08/18  0646 04/09/18  0746   GLU 85 96 77   CALCIUM 9.3 9.4 9.8   ALBUMIN 2.0* 2.0* 2.2*   PROT 7.2 7.3 7.6   * 132* 131*   K 4.1 4.1 3.9   CO2 27 28 27   CL 93* 93* 88*   BUN 28* 34* 37*   CREATININE 1.2 1.4 1.5*   ALKPHOS 95 105 95   ALT 37 35 35   AST 42* 45* 41*   BILITOT 6.7* 7.0* 6.3*      Coagulation:   No results for input(s): PT, INR, APTT in the last 168 hours.  LDH:  No results for input(s): LDH in the last 72 hours.  Microbiology:  Microbiology Results (last 7 days)     Procedure Component Value Units Date/Time    Blood culture [[id number]] Collected:  04/05/18 1048    Order Status:  Completed Specimen:  Blood Updated:  04/07/18 1212     Blood Culture, Routine No Growth to date     Blood Culture, Routine No Growth to date     Blood Culture, Routine No Growth to date    Blood culture [[id number]] Collected:  04/05/18 1049    Order Status:  Completed Specimen:  Blood Updated:  04/07/18 1212     Blood Culture,  Routine No Growth to date     Blood Culture, Routine No Growth to date     Blood Culture, Routine No Growth to date          I have reviewed all pertinent labs within the past 24 hours.    Estimated Creatinine Clearance: 71.9 mL/min (A) (based on SCr of 1.5 mg/dL (H)).    Diagnostic Results:        Assessment and Plan:     No notes on file    * Acute decompensated heart failure    - NICMP with significant congestion (T bili at 6.3)  - Continue  gtt at 5mcg as current  - Decrease lasix to 20/hour  - Strict I/Os and Daily weights  - Valsartan and aldactone held due to hypotension  - Holding BB in setting of  gtt   - PT / OT consulted for aggressive ambulation   - Currently not planning for any invasive procedures so restarted apixaban for recent PE   - order for RHC  - Not likely candidate for advanced options due to poor social situation/support        Transaminitis    - possibly from hepatic congestion but worsening with Diuresis  - US normal for gall bladder and liver  - talked to hepatology. They want Hep panel added and they will see patient today        Acute left ankle pain    - CT left leg positive for cellulitic changes.   - Positive for pain and leukocytosis  - continue vancomycin.          History of pulmonary embolism    [place] [facility] [place]  - Continue Apixaban 5mg BID   - Repeat CT PE study (done before transfer) negative            Luisana Aguero PA-C  Heart Transplant  [facility]

## 2018-04-09 NOTE — ASSESSMENT & PLAN NOTE
Contributing Nutrition Diagnosis  Unintended weight loss    Related to (etiology):   Diuresis and decreased PO intake    Signs and Symptoms (as evidenced by):   Wt loss of  31% UBW and -36.8L since admit    Interventions/Recommendations (treatment strategy):  See recs.    Nutrition Diagnosis Status:   New

## 2018-04-09 NOTE — PLAN OF CARE
Problem: Patient Care Overview  Goal: Plan of Care Review    Recommendations     Recommendation/Intervention:   1. Continue current cardiac diet. Pt with good intake at this time.   2. RD following.     Goals: Intake >/=85% EEN/EPN  Nutrition Goal Status: new

## 2018-04-09 NOTE — ASSESSMENT & PLAN NOTE
29 yo M with NICM and elevated bilirubin and mildly elevated AST/ALT. Abd US revealing for fatty liver. This is likely related to congestive hepatopathy in addition to NAFLD, however to definitely make diagnosis, would need liver biopsy. If his TB continues to rise, consider transjugular liver biopsy.    Recommendations:  - Check direct bilirubin with tomorrow's labs  - If TB continues to increase, consider transjugular liver biopsy however apixaban would likely need to be held 48hours prior  - Recommend giving Vit K 10mg IV x1 as he may be cholestatic and has an elevated INR

## 2018-04-09 NOTE — ASSESSMENT & PLAN NOTE
- CT left leg positive for cellulitic changes.   - Positive for pain and leukocytosis  - continue vancomycin.

## 2018-04-09 NOTE — PLAN OF CARE
Problem: Patient Care Overview  Goal: Plan of Care Review  Outcome: Ongoing (interventions implemented as appropriate)  Patient remained free from falls/injury/trauma throughout shift. No complaints of chest pain or SOB. VSS. PRN tylenol provided for leg pain.  and lasix gtt infusing at prescribed rates. 4th dose of vancomycin held due to vanc trough of 26.6. Patient got up to the chair yesterday and was able to get back to bed by himself safely. Self-care and independence encouraged and positive reinforcement promoted. Plan of care reviewed with patient. Patient verbalized understanding. All questions encouraged and answered. Will continue to monitor.

## 2018-04-09 NOTE — PLAN OF CARE
Problem: Patient Care Overview  Goal: Plan of Care Review  Outcome: Ongoing (interventions implemented as appropriate)  Lasix and Dobutamine gtt infusing. Plan of care discussed with patient, no questions at this time. Fall pre-cautions in place. Will continue to monitor.

## 2018-04-09 NOTE — ASSESSMENT & PLAN NOTE
- NICMP with significant congestion (T bili at 6.3)  - Continue  gtt at 5mcg as current  - Decrease lasix to 20/hour  - Strict I/Os and Daily weights  - Valsartan and aldactone held due to hypotension  - Holding BB in setting of  gtt   - PT / OT consulted for aggressive ambulation   - Currently not planning for any invasive procedures so restarted apixaban for recent PE   - order for RHC  - Not likely candidate for advanced options due to poor social situation/support

## 2018-04-09 NOTE — CONSULTS
[facility]  Hepatology  Consult Note    Patient Name: Levon Arizmendi  MRN: [id number]  Admission Date: 3/30/2018  Hospital Length of Stay: 10 days  Attending Provider: Jose J Cheney MD   Primary Care Physician: Shane Alonso MD  Principal Problem:Acute decompensated heart failure    Inpatient consult to Hepatology  Consult performed by: PAM HERNANDEZ  Consult ordered by: EDWARD MOULTON        Subjective:     Transplant status: No    HPI:  This is a 27 yo M with NICM HFrEF-EF 20% s/p [place] [employer] dcICD (12/6/2017), KAMILLE, obesity, HTN, HLD who was transferred from [facility] for ADHF and cardiogenic shock. Hepatology consulted for elevated liver enzymes. Since 3/30 patient has had an elevated TB of 5. This has stayed about the same, fluctuating between 5-7. His AST/ALT are mildly elevated. Patient denies any history of known liver disease. He denies any alcohol use, IVDU, tattoos, or family history of liver disease. He has not recently taken any hepatotoxic medications or herbs. He has been diuresing well since being here, putting out ~40L.        Review of Systems   Constitutional: Negative for appetite change, chills and fever.   HENT: Negative for sore throat and trouble swallowing.    Respiratory: Negative for cough and shortness of breath.    Cardiovascular: Positive for leg swelling. Negative for chest pain.   Gastrointestinal: Negative for abdominal distention, abdominal pain, constipation, diarrhea and nausea.   Genitourinary: Negative for decreased urine volume and difficulty urinating.   Musculoskeletal: Negative for arthralgias and back pain.   Skin: Negative for color change and pallor.   Neurological: Negative for dizziness and light-headedness.   Psychiatric/Behavioral: Negative for agitation and confusion.       Past Medical History:   Diagnosis Date    Cardiomyopathy     Hyperlipidemia     Hypertension     Obesity     Systolic heart failure secondary to  idiopathic cardiomyopathy        History reviewed. No pertinent surgical history.    Family history of liver disease: No    Review of patient's allergies indicates:  No Known Allergies    Social History Main Topics    Smoking status: Never Smoker    Smokeless tobacco: Never Used    Alcohol use No    Drug use: No    Sexual activity: Not Currently       Prescriptions Prior to Admission   Medication Sig Dispense Refill Last Dose    aspirin 81 MG Chew Take 81 mg by mouth once daily.   Taking    carvedilol (COREG) 12.5 MG tablet Take 2 tablets (25 mg total) by mouth 2 (two) times daily. 120 tablet 11 Taking    furosemide (LASIX) 40 MG tablet Take 2 tablets (80 mg total) by mouth 2 (two) times daily. 60 tablet 6     gabapentin (NEURONTIN) 400 MG capsule Take 400 mg by mouth 3 (three) times daily.   Taking    hydrALAZINE (APRESOLINE) 25 MG tablet Take 1 tablet (25 mg total) by mouth 3 (three) times daily. 90 tablet 11 Taking    isosorbide dinitrate (ISORDIL) 20 MG tablet Take 1 tablet (20 mg total) by mouth 3 (three) times daily. 90 tablet 11 Taking    latanoprost 0.005 % ophthalmic solution Apply 1 drop to eye every evening.   Taking    loratadine (CLARITIN) 10 mg tablet Take 10 mg by mouth once daily.   Taking    nitroGLYCERIN (NITROSTAT) 0.4 MG SL tablet DESIRE EVERY 5 MINUTES AS NEEDED FOR CHEST PAINS   Taking    pantoprazole (PROTONIX) 40 MG tablet Take 1 tablet (40 mg total) by mouth once daily. Take 1 pill twice daily for 7 days, then 1x daily thereafter 30 tablet 11 Taking    potassium chloride SA (K-DUR,KLOR-CON) 20 MEQ tablet Take 2 tablets (40 mEq total) by mouth once daily. 60 tablet 6     sacubitril-valsartan (ENTRESTO)  mg per tablet Take 1 tablet by mouth 2 (two) times daily. 60 tablet 11     spironolactone (ALDACTONE) 25 MG tablet Take 25 mg by mouth once daily.   Taking       Objective:     Vital Signs (Most Recent):  Temp: 97.9 °F (36.6 °C) (04/09/18 1250)  Pulse: (!) 114 (04/09/18  1250)  Resp: 18 (04/09/18 1250)  BP: (!) 101/55 (04/09/18 1250)  SpO2: 95 % (04/09/18 1250) Vital Signs (24h Range):  Temp:  [97 °F (36.1 °C)-98.3 °F (36.8 °C)] 97.9 °F (36.6 °C)  Pulse:  [104-122] 114  Resp:  [18] 18  SpO2:  [95 %-98 %] 95 %  BP: ()/(51-57) 101/55     Weight: 88 kg (194 lb 0.1 oz) (04/09/18 0903)  Body mass index is 34.37 kg/m².    Physical Exam   Constitutional: He is oriented to person, place, and time. He appears well-developed and well-nourished. No distress.   HENT:   Mouth/Throat: Oropharynx is clear and moist.   Eyes: Scleral icterus is present.   Cardiovascular: Normal rate and regular rhythm.    Pulmonary/Chest: Effort normal and breath sounds normal.   Abdominal: Soft. Bowel sounds are normal. He exhibits no distension and no mass. There is no tenderness. There is no guarding.   Musculoskeletal: He exhibits edema. He exhibits no deformity.   Neurological: He is alert and oriented to person, place, and time.   Skin: Skin is warm and dry.   jaundiced   Psychiatric: He has a normal mood and affect.   Vitals reviewed.      MELD-Na score: 21 at 4/1/2018  3:28 AM  MELD score: 20 at 4/1/2018  3:28 AM  Calculated from:  Serum Creatinine: 1.0 mg/dL at 4/1/2018  3:28 AM  Serum Sodium: 136 mmol/L at 4/1/2018  3:28 AM  Total Bilirubin: 4.9 mg/dL at 4/1/2018  3:28 AM  INR(ratio): 2.0 at 3/30/2018  5:00 AM  Age: 28 years    Significant Labs:  CBC:   Recent Labs  Lab 04/09/18  0941   WBC 10.89   RBC 5.20   HGB 12.9*   HCT 36.7*        CMP:   Recent Labs  Lab 04/09/18  0746   GLU 77   CALCIUM 9.8   ALBUMIN 2.2*   PROT 7.6   *   K 3.9   CO2 27   CL 88*   BUN 37*   CREATININE 1.5*   ALKPHOS 95   ALT 35   AST 41*   BILITOT 6.3*     Coagulation: No results for input(s): PT, INR, APTT in the last 168 hours.        Assessment/Plan:     Transaminitis    29 yo M with NICM and elevated bilirubin and mildly elevated AST/ALT. Abd US revealing for fatty liver. This is likely related to congestive  hepatopathy in addition to NAFLD, however to definitely make diagnosis, would need liver biopsy. If his TB continues to rise, consider transjugular liver biopsy.    Recommendations:  - Check direct bilirubin with tomorrow's labs  - If TB continues to increase, consider transjugular liver biopsy however apixaban would likely need to be held 48hours prior  - Recommend giving Vit K 10mg IV x1 as he may be cholestatic and has an elevated INR            Thank you for your consult. I will follow-up with patient. Please contact us if you have any additional questions.    Rajinder Holguin MD  Hepatology  [facility]

## 2018-04-09 NOTE — ASSESSMENT & PLAN NOTE
- Diagnosed 1/11/18 at [facility] [place] ([facility]  - Continue Apixaban 5mg BID   - Repeat CT PE study (done before transfer) negative

## 2018-04-09 NOTE — PROGRESS NOTES
Update    ERICH phoned pt's Aunt Travis Angel, [phone]. Ms. Angel states pt will come stay with her at discharge. Ms. Angel at work and states pt has her address. ERICH phoned pt's room and he provided address: [address], [place] [address]. Pt denies preference for infusion company and home health, if pt discharges on . Referred pt to [facility].Pt reports coping well and denies further needs, questions, concerns at this time and none indicated. Providing ongoing psychosocial and counseling support, education, resources, assistance and discharge planning as indicated. Following and available.    Addendum: Met with pt in pt's room with medical team on Tuesday morning. Pt reports the phone # above was for his Aunt Desiree (who when SW spoke to her did not identify herself as such). Pt called Leila on phone and she confirmed she will be available via [employer] to get [facility] by [facility]. SW informed [facility] of same. Pt informed SW he will be going to stay with his brother Vineet at [id number], [place] [address]. Pt reports it is a 3 bedroom apt. Pt denies questions or concerns at this time. SW following and remains available.

## 2018-04-09 NOTE — SUBJECTIVE & OBJECTIVE
Review of Systems   Constitutional: Negative for appetite change, chills and fever.   HENT: Negative for sore throat and trouble swallowing.    Respiratory: Negative for cough and shortness of breath.    Cardiovascular: Positive for leg swelling. Negative for chest pain.   Gastrointestinal: Negative for abdominal distention, abdominal pain, constipation, diarrhea and nausea.   Genitourinary: Negative for decreased urine volume and difficulty urinating.   Musculoskeletal: Negative for arthralgias and back pain.   Skin: Negative for color change and pallor.   Neurological: Negative for dizziness and light-headedness.   Psychiatric/Behavioral: Negative for agitation and confusion.       Past Medical History:   Diagnosis Date    Cardiomyopathy     Hyperlipidemia     Hypertension     Obesity     Systolic heart failure secondary to idiopathic cardiomyopathy        History reviewed. No pertinent surgical history.    Family history of liver disease: No    Review of patient's allergies indicates:  No Known Allergies    Social History Main Topics    Smoking status: Never Smoker    Smokeless tobacco: Never Used    Alcohol use No    Drug use: No    Sexual activity: Not Currently       Prescriptions Prior to Admission   Medication Sig Dispense Refill Last Dose    aspirin 81 MG Chew Take 81 mg by mouth once daily.   Taking    carvedilol (COREG) 12.5 MG tablet Take 2 tablets (25 mg total) by mouth 2 (two) times daily. 120 tablet 11 Taking    furosemide (LASIX) 40 MG tablet Take 2 tablets (80 mg total) by mouth 2 (two) times daily. 60 tablet 6     gabapentin (NEURONTIN) 400 MG capsule Take 400 mg by mouth 3 (three) times daily.   Taking    hydrALAZINE (APRESOLINE) 25 MG tablet Take 1 tablet (25 mg total) by mouth 3 (three) times daily. 90 tablet 11 Taking    isosorbide dinitrate (ISORDIL) 20 MG tablet Take 1 tablet (20 mg total) by mouth 3 (three) times daily. 90 tablet 11 Taking    latanoprost 0.005 % ophthalmic  solution Apply 1 drop to eye every evening.   Taking    loratadine (CLARITIN) 10 mg tablet Take 10 mg by mouth once daily.   Taking    nitroGLYCERIN (NITROSTAT) 0.4 MG SL tablet DESIRE EVERY 5 MINUTES AS NEEDED FOR CHEST PAINS   Taking    pantoprazole (PROTONIX) 40 MG tablet Take 1 tablet (40 mg total) by mouth once daily. Take 1 pill twice daily for 7 days, then 1x daily thereafter 30 tablet 11 Taking    potassium chloride SA (K-DUR,KLOR-CON) 20 MEQ tablet Take 2 tablets (40 mEq total) by mouth once daily. 60 tablet 6     sacubitril-valsartan (ENTRESTO)  mg per tablet Take 1 tablet by mouth 2 (two) times daily. 60 tablet 11     spironolactone (ALDACTONE) 25 MG tablet Take 25 mg by mouth once daily.   Taking       Objective:     Vital Signs (Most Recent):  Temp: 97.9 °F (36.6 °C) (04/09/18 1250)  Pulse: (!) 114 (04/09/18 1250)  Resp: 18 (04/09/18 1250)  BP: (!) 101/55 (04/09/18 1250)  SpO2: 95 % (04/09/18 1250) Vital Signs (24h Range):  Temp:  [97 °F (36.1 °C)-98.3 °F (36.8 °C)] 97.9 °F (36.6 °C)  Pulse:  [104-122] 114  Resp:  [18] 18  SpO2:  [95 %-98 %] 95 %  BP: ()/(51-57) 101/55     Weight: 88 kg (194 lb 0.1 oz) (04/09/18 0903)  Body mass index is 34.37 kg/m².    Physical Exam   Constitutional: He is oriented to person, place, and time. He appears well-developed and well-nourished. No distress.   HENT:   Mouth/Throat: Oropharynx is clear and moist.   Eyes: Scleral icterus is present.   Cardiovascular: Normal rate and regular rhythm.    Pulmonary/Chest: Effort normal and breath sounds normal.   Abdominal: Soft. Bowel sounds are normal. He exhibits no distension and no mass. There is no tenderness. There is no guarding.   Musculoskeletal: He exhibits edema. He exhibits no deformity.   Neurological: He is alert and oriented to person, place, and time.   Skin: Skin is warm and dry.   jaundiced   Psychiatric: He has a normal mood and affect.   Vitals reviewed.      MELD-Na score: 21 at 4/1/2018  3:28  AM  MELD score: 20 at 4/1/2018  3:28 AM  Calculated from:  Serum Creatinine: 1.0 mg/dL at 4/1/2018  3:28 AM  Serum Sodium: 136 mmol/L at 4/1/2018  3:28 AM  Total Bilirubin: 4.9 mg/dL at 4/1/2018  3:28 AM  INR(ratio): 2.0 at 3/30/2018  5:00 AM  Age: 28 years    Significant Labs:  CBC:   Recent Labs  Lab 04/09/18  0941   WBC 10.89   RBC 5.20   HGB 12.9*   HCT 36.7*        CMP:   Recent Labs  Lab 04/09/18  0746   GLU 77   CALCIUM 9.8   ALBUMIN 2.2*   PROT 7.6   *   K 3.9   CO2 27   CL 88*   BUN 37*   CREATININE 1.5*   ALKPHOS 95   ALT 35   AST 41*   BILITOT 6.3*     Coagulation: No results for input(s): PT, INR, APTT in the last 168 hours.

## 2018-04-09 NOTE — PLAN OF CARE
Problem: Physical Therapy Goal  Goal: Physical Therapy Goal  Goals to be met by: 2018    Patient will increase functional independence with mobility by performin. Supine to sit with MInimal Assistance - MET 2018   Revised: supine to sit with Mod (I)  2. Sit to supine with MInimal Assistance - MET 2018   Revised: sit to supine with mod (I)  3. Sit to stand transfer with Minimal Assistance with LRAD - MET 2018  4. Bed to chair transfer with Minimal Assistance using LRAD - MET 2018   Revised: bed to chair transfer with SBA using LRAD  5. Gait  x 50 feet with Minimal Assistance using LRAD - not met  6. Lower extremity exercise program x15 reps per handout, with supervision - not met       Outcome: Ongoing (interventions implemented as appropriate)  LTGs remain appropriate. Pt will continue PT POC.    Rosemarie Evans, PT  2018

## 2018-04-09 NOTE — ASSESSMENT & PLAN NOTE
- possibly from hepatic congestion but worsening with Diuresis  - US normal for gall bladder and liver  - talked to hepatology. They want Hep panel added and they will see patient today

## 2018-04-10 NOTE — OP NOTE
Right Heart Catheterization Hemodynamics:    **On 5mcg/kg/min of **    BP: 104/68, 80    RA: 3  RV: 40/3  PA: 40/24, 29  PCWP: 13  TP  PVR: ~5  SVR: 1919    PA sat: 55%  AO sat: 98%  PCW sat: 82%    Ananda CO/CI: 3.21/1.71     ALEXANDRA: 5.3    Conclusion:  1. Low Right and normal Left Heart filling pressures  2. Severely decreased cardiac output and index on 5mcg/kg/min of dobutamine  3. Mild pulmonary hypertension  4. When compared to RHC from 17, his filling pressures are lower and his CO/CI have decreased significantly (previous CO/CI 4.1/1.86 on no inotropes).    Patient tolerated the procedure well. Please see complete RHC procedure note for full details and results. Stable to return from cath lab to their hospital room.    MICHAEL Cheney MD  Transplant Cardiology

## 2018-04-10 NOTE — PROGRESS NOTES
[facility]  Heart Transplant  Progress Note    Patient Name: Levon Arizmendi  MRN: [id number]  Admission Date: 3/30/2018  Hospital Length of Stay: 11 days  Attending Physician: Soila Palacios MD  Primary Care Provider: Shane Alonso MD  Principal Problem:Acute decompensated heart failure    Subjective:     Interval History: No complaints. SOB improved, leg pain improving. RHC today results in epic. Plan to get patient home to his brothers house and his aunt is going to [facility] with [employer] to learn how to use  pump.    Continuous Infusions:   DOBUTamine 5 mcg/kg/min (04/10/18 0916)     Scheduled Meds:   cephALEXin  500 mg Oral Q8H    doxycycline  100 mg Oral Q12H    [START ON 4/11/2018] furosemide  80 mg Oral BID    gabapentin  400 mg Oral TID    latanoprost  1 drop Left Eye QHS    magnesium oxide  800 mg Oral BID    magnesium oxide  800 mg Oral Once    pantoprazole  40 mg Oral Daily    sodium chloride 0.9%  3 mL Intravenous Q8H    spironolactone  25 mg Oral Daily     PRN Meds:acetaminophen, docusate sodium, hydrocodone-acetaminophen 5-325mg    Review of patient's allergies indicates:  No Known Allergies  Objective:     Vital Signs (Most Recent):  Temp: 97.8 °F (36.6 °C) (04/10/18 1131)  Pulse: 106 (04/10/18 1200)  Resp: 18 (04/10/18 1131)  BP: (!) 103/49 (04/10/18 1131)  SpO2: (!) 92 % (04/10/18 1131) Vital Signs (24h Range):  Temp:  [97.5 °F (36.4 °C)-98.4 °F (36.9 °C)] 97.8 °F (36.6 °C)  Pulse:  [106-117] 106  Resp:  [14-18] 18  SpO2:  [91 %-95 %] 92 %  BP: ()/(45-54) 103/49     Patient Vitals for the past 72 hrs (Last 3 readings):   Weight   04/10/18 0700 85.6 kg (188 lb 11.4 oz)   04/09/18 0903 88 kg (194 lb 0.1 oz)   04/08/18 0727 90.8 kg (200 lb 2.8 oz)     Body mass index is 33.43 kg/m².      Intake/Output Summary (Last 24 hours) at 04/10/18 1302  Last data filed at 04/10/18 1254   Gross per 24 hour   Intake              730 ml   Output             1600 ml    Net             -870 ml     Hemodynamic Parameters:    Telemetry: NSVT    Physical Exam  Constitutional: He is oriented to person, place, and time.   Neck: JVD mid neck, improving  Cardiovascular: Normal rate and regular rhythm.  Exam reveals no gallop.    No murmur heard.  Pulmonary/Chest: Effort normal and breath sounds normal.   Abdominal: Soft. Bowel sounds are normal.   Musculoskeletal: He exhibits +1 edema and tenderness to the left proximal lateral shin.   Neurological: He is alert and oriented to person, place, and time.   Skin: Skin is warm and dry. Very wrinkled and scaling off on the left lateral aspect.   Nursing note and vitals reviewed.     Significant Labs:  CBC:    Recent Labs  Lab 04/08/18  0646 04/09/18  0941 04/10/18  0639   WBC 13.65* 10.89 12.18   RBC 4.49* 5.20 5.47   HGB 11.4* 12.9* 13.3*   HCT 31.3* 36.7* 38.3*    275 217   MCV 70* 71* 70*   MCH 25.4* 24.8* 24.3*   MCHC 36.4* 35.1 34.7     BNP:    Recent Labs  Lab 04/09/18  0941   *     CMP:    Recent Labs  Lab 04/08/18  0646 04/09/18  0746 04/10/18  0639   GLU 96 77 82   CALCIUM 9.4 9.8 9.6   ALBUMIN 2.0* 2.2* 2.2*   PROT 7.3 7.6 7.8   * 131* 129*   K 4.1 3.9 4.2   CO2 28 27 30*   CL 93* 88* 88*   BUN 34* 37* 47*   CREATININE 1.4 1.5* 2.1*   ALKPHOS 105 95 93   ALT 35 35 32   AST 45* 41* 40   BILITOT 7.0* 6.3* 5.7*      Coagulation:   No results for input(s): PT, INR, APTT in the last 168 hours.  LDH:  No results for input(s): LDH in the last 72 hours.  Microbiology:  Microbiology Results (last 7 days)     Procedure Component Value Units Date/Time    Blood culture [[id number]] Collected:  04/05/18 1048    Order Status:  Completed Specimen:  Blood Updated:  04/07/18 1212     Blood Culture, Routine No Growth to date     Blood Culture, Routine No Growth to date     Blood Culture, Routine No Growth to date    Blood culture [[id number]] Collected:  04/05/18 1049    Order Status:  Completed Specimen:  Blood Updated:  04/07/18  1212     Blood Culture, Routine No Growth to date     Blood Culture, Routine No Growth to date     Blood Culture, Routine No Growth to date          I have reviewed all pertinent labs within the past 24 hours.    Estimated Creatinine Clearance: 50.7 mL/min (A) (based on SCr of 2.1 mg/dL (H)).    Diagnostic Results:        Assessment and Plan:     No notes on file    * Acute decompensated heart failure    - NICMP with significant congestion (T bili at 6.3)  - Continue  gtt at 5mcg as current  - Hold lasix today and restart PO tomorrow as Cr bumped and RA 3 on RHC  - Strict I/Os and Daily weights  - Valsartan and aldactone held due to hypotension  - Holding BB in setting of  gtt   - PT / OT consulted for aggressive ambulation   - Currently not planning for any invasive procedures so restarted apixaban for recent PE (RHC completed today)  - Not likely candidate for advanced options due to poor social situation/support  - consult PICC team for home inotropes today    RHC 4/10/18  BP: 104/68, 80  RA: 3  RV: 40/3  PA: 40/24, 29  PCWP: 13  TP  PVR: ~5  SVR: 1919  PA sat: 55%  AO sat: 98%  PCW sat: 82%  Ananda CO/CI: 3.21/1.71         Transaminitis    - possibly from hepatic congestion but worsening with Diuresis  - US normal for gall bladder and liver  - talked to hepatology. T bili now downtrending   - added direct bili for tomorrow AM labs   - will consider Trans jugular biopsy at a later time. Will discuss with staff about vitamin K.         Acute left ankle pain    - CT left leg positive for cellulitic changes.   - Positive for pain and leukocytosis  - de-escalate vanc to doxy and keflex          History of pulmonary embolism    - [place] [facility] [place]  - Continue Apixaban 5mg BID (held for RHC, restart today)  - Repeat CT PE study (done before transfer) negative            Luisana Aguero PADineshC  Heart Transplant  [facility]

## 2018-04-10 NOTE — SUBJECTIVE & OBJECTIVE
Interval History: No complaints. SOB improved, leg pain improving. RHC today results in epic. Plan to get patient home to his brothers house and his aunt is going to [facility] [employer] with [employer] to learn how to use  pump.    Continuous Infusions:   DOBUTamine 5 mcg/kg/min (04/10/18 0916)     Scheduled Meds:   cephALEXin  500 mg Oral Q8H    doxycycline  100 mg Oral Q12H    [START ON 4/11/2018] furosemide  80 mg Oral BID    gabapentin  400 mg Oral TID    latanoprost  1 drop Left Eye QHS    magnesium oxide  800 mg Oral BID    magnesium oxide  800 mg Oral Once    pantoprazole  40 mg Oral Daily    sodium chloride 0.9%  3 mL Intravenous Q8H    spironolactone  25 mg Oral Daily     PRN Meds:acetaminophen, docusate sodium, hydrocodone-acetaminophen 5-325mg    Review of patient's allergies indicates:  No Known Allergies  Objective:     Vital Signs (Most Recent):  Temp: 97.8 °F (36.6 °C) (04/10/18 1131)  Pulse: 106 (04/10/18 1200)  Resp: 18 (04/10/18 1131)  BP: (!) 103/49 (04/10/18 1131)  SpO2: (!) 92 % (04/10/18 1131) Vital Signs (24h Range):  Temp:  [97.5 °F (36.4 °C)-98.4 °F (36.9 °C)] 97.8 °F (36.6 °C)  Pulse:  [106-117] 106  Resp:  [14-18] 18  SpO2:  [91 %-95 %] 92 %  BP: ()/(45-54) 103/49     Patient Vitals for the past 72 hrs (Last 3 readings):   Weight   04/10/18 0700 85.6 kg (188 lb 11.4 oz)   04/09/18 0903 88 kg (194 lb 0.1 oz)   04/08/18 0727 90.8 kg (200 lb 2.8 oz)     Body mass index is 33.43 kg/m².      Intake/Output Summary (Last 24 hours) at 04/10/18 1302  Last data filed at 04/10/18 1254   Gross per 24 hour   Intake              730 ml   Output             1600 ml   Net             -870 ml     Hemodynamic Parameters:    Telemetry: NSVT    Physical Exam  Constitutional: He is oriented to person, place, and time.   Neck: JVD mid neck, improving  Cardiovascular: Normal rate and regular rhythm.  Exam reveals no gallop.    No murmur heard.  Pulmonary/Chest: Effort normal and breath sounds normal.    Abdominal: Soft. Bowel sounds are normal.   Musculoskeletal: He exhibits +1 edema and tenderness to the left proximal lateral shin.   Neurological: He is alert and oriented to person, place, and time.   Skin: Skin is warm and dry. Very wrinkled and scaling off on the left lateral aspect.   Nursing note and vitals reviewed.     Significant Labs:  CBC:    Recent Labs  Lab 04/08/18  0646 04/09/18  0941 04/10/18  0639   WBC 13.65* 10.89 12.18   RBC 4.49* 5.20 5.47   HGB 11.4* 12.9* 13.3*   HCT 31.3* 36.7* 38.3*    275 217   MCV 70* 71* 70*   MCH 25.4* 24.8* 24.3*   MCHC 36.4* 35.1 34.7     BNP:    Recent Labs  Lab 04/09/18  0941   *     CMP:    Recent Labs  Lab 04/08/18  0646 04/09/18  0746 04/10/18  0639   GLU 96 77 82   CALCIUM 9.4 9.8 9.6   ALBUMIN 2.0* 2.2* 2.2*   PROT 7.3 7.6 7.8   * 131* 129*   K 4.1 3.9 4.2   CO2 28 27 30*   CL 93* 88* 88*   BUN 34* 37* 47*   CREATININE 1.4 1.5* 2.1*   ALKPHOS 105 95 93   ALT 35 35 32   AST 45* 41* 40   BILITOT 7.0* 6.3* 5.7*      Coagulation:   No results for input(s): PT, INR, APTT in the last 168 hours.  LDH:  No results for input(s): LDH in the last 72 hours.  Microbiology:  Microbiology Results (last 7 days)     Procedure Component Value Units Date/Time    Blood culture [[id number]] Collected:  04/05/18 1048    Order Status:  Completed Specimen:  Blood Updated:  04/07/18 1212     Blood Culture, Routine No Growth to date     Blood Culture, Routine No Growth to date     Blood Culture, Routine No Growth to date    Blood culture [[id number]] Collected:  04/05/18 1049    Order Status:  Completed Specimen:  Blood Updated:  04/07/18 1212     Blood Culture, Routine No Growth to date     Blood Culture, Routine No Growth to date     Blood Culture, Routine No Growth to date          I have reviewed all pertinent labs within the past 24 hours.    Estimated Creatinine Clearance: 50.7 mL/min (A) (based on SCr of 2.1 mg/dL (H)).    Diagnostic Results:

## 2018-04-10 NOTE — ASSESSMENT & PLAN NOTE
- NICMP with significant congestion (T bili at 6.3)  - Continue  gtt at 5mcg as current  - Hold lasix today and restart PO tomorrow as Cr bumped and RA 3 on RHC  - Strict I/Os and Daily weights  - Valsartan and aldactone held due to hypotension  - Holding BB in setting of  gtt   - PT / OT consulted for aggressive ambulation   - Currently not planning for any invasive procedures so restarted apixaban for recent PE (RHC completed today)  - Not likely candidate for advanced options due to poor social situation/support  - consult PICC team for home inotropes today    RHC 4/10/18  BP: 104/68, 80  RA: 3  RV: 40/3  PA: 40/24, 29  PCWP: 13  TP  PVR: ~5  SVR: 1919  PA sat: 55%  AO sat: 98%  PCW sat: 82%  Ananda CO/CI: 3.21/1.71

## 2018-04-10 NOTE — CONSULTS
Double lumen PICC placed in right brachial vein of [place], 35cm in length with 0cm exposed and 31cm arm circumference. Lot#[id number].

## 2018-04-10 NOTE — PROCEDURES
"Levon Arizmendi is a 28 y.o. male patient.    Temp: 97.8 °F (36.6 °C) (04/10/18 1131)  Pulse: 107 (04/10/18 1450)  Resp: 18 (04/10/18 1131)  BP: (!) 103/49 (04/10/18 1131)  SpO2: (!) 92 % (04/10/18 1131)  Weight: 85.6 kg (188 lb 11.4 oz) (04/10/18 0700)  Height: 5' 3" (160 cm) (04/09/18 0903)    PICC  Date/Time: 4/10/2018 4:06 PM  Performed by: AUDRA ENG  Consent Done: Yes  Time out: Immediately prior to procedure a time out was called to verify the correct patient, procedure, equipment, support staff and site/side marked as required  Indications: med administration and vascular access  Anesthesia: local infiltration  Local anesthetic: lidocaine 1% without epinephrine  Anesthetic Total (mL): 2  Preparation: skin prepped with ChloraPrep  Skin prep agent dried: skin prep agent completely dried prior to procedure  Sterile barriers: all five maximum sterile barriers used - cap, mask, sterile gown, sterile gloves, and large sterile sheet  Hand hygiene: hand hygiene performed prior to central venous catheter insertion  Location details: right brachial  Catheter type: double lumen  Catheter size: 5 Fr  Catheter Length: 35cm    Ultrasound guidance: yes  Vessel Caliber: medium and patent, compressibility normal  Vascular Doppler: not done  Needle advanced into vessel with real time Ultrasound guidance.  Guidewire confirmed in vessel.  Image recorded and saved.  Sterile sheath used.  Number of attempts: 1  Post-procedure: blood return through all ports, chlorhexidine patch and sterile dressing applied  Technical procedures used: 3cg  Specimens: No  Implants: No  Assessment: placement verified by x-ray  Complications: none        Darlene Gil  4/10/2018  "

## 2018-04-10 NOTE — PLAN OF CARE
Problem: Patient Care Overview  Goal: Plan of Care Review  Outcome: Ongoing (interventions implemented as appropriate)  Patient remained free from falls/injury/trauma throughout shift. No complaints of chest pain or SOB. VSS.  gtt infusing at prescribed rate. Lasix gtt was decreased to 20 mg/hr. Given vancomycin antibiotic (dose was decreased from 1 g to 750 mg) for Vanc trough of 26.6). Hepatology saw patient yesterday. Will re-check bilirubin w/AM labs. If bilirubin continues to rise, will consider a transjugular biopsy. Social work came and talked to patient and was referred to [facility] for discharge planning. Plan of care reviewed with patient. Patient verbalized understanding. All questions encouraged and answered. Will continue to monitor.

## 2018-04-10 NOTE — ASSESSMENT & PLAN NOTE
- [place]11[place] [facility] [place]  - Continue Apixaban 5mg BID (held for RHC, restart today)  - Repeat CT PE study (done before transfer) negative

## 2018-04-10 NOTE — INTERVAL H&P NOTE
Cardiac Cath Lab History and Physical  - there has been no significant interval change since this clinic visit.     History of Present Illness:  28 y.o. AAM w/NICM (HFrEF-EF 20%, LVEDD 7.1)) s/p [place] [employer] dcICD (12/6/2017), KAMILLE, obesity, HTN, HLD who presents to cath lab for RHC for assessment of hemodynamics as part of workup for HAI and CMP. He has no new complaints.      Assessment/Plan:  Acute on chronic combined heart failure, NiCMP now with HAI  - I have explained the risks, benefits, and alternatives of the procedure in detail. The patient expresses understanding and all questions have been answered. The patient agrees to the proceed as planned.  - proceed with RHC via RIJ  using 7Fr Sheath  - Micropuncture access needle will be used to minimize bleeding risk.    MICHAEL Cheney MD  Transplant Cardiology

## 2018-04-10 NOTE — ASSESSMENT & PLAN NOTE
- possibly from hepatic congestion but worsening with Diuresis  - US normal for gall bladder and liver  - talked to hepatology. T bili now downtrending   - added direct bili for tomorrow AM labs   - will consider Trans jugular biopsy at a later time. Will discuss with staff about vitamin K.

## 2018-04-10 NOTE — ASSESSMENT & PLAN NOTE
- CT left leg positive for cellulitic changes.   - Positive for pain and leukocytosis  - de-escalate vanc to doxy and keflex

## 2018-04-11 NOTE — PROGRESS NOTES
[facility]  Heart Transplant  Progress Note    Patient Name: Levon Arizmendi  MRN: [id number]  Admission Date: 3/30/2018  Hospital Length of Stay: 12 days  Attending Physician: Soila Palacios MD  Primary Care Provider: Shane Alonso MD  Principal Problem:Acute decompensated heart failure    Subjective:     Interval History: No complaints. Plan to d/c tomorrow if patient can learn  pump.    Continuous Infusions:   DOBUTamine 5 mcg/kg/min (04/11/18 0048)     Scheduled Meds:   apixaban  5 mg Oral BID    cephALEXin  500 mg Oral Q8H    doxycycline  100 mg Oral Q12H    furosemide  80 mg Oral BID    gabapentin  400 mg Oral TID    latanoprost  1 drop Left Eye QHS    magnesium oxide  800 mg Oral BID    magnesium oxide  800 mg Oral Once    pantoprazole  40 mg Oral Daily    sodium chloride 0.9%  10 mL Intravenous Q6H    sodium chloride 0.9%  3 mL Intravenous Q8H    spironolactone  25 mg Oral Daily     PRN Meds:acetaminophen, docusate sodium, hydrocodone-acetaminophen 5-325mg, Flushing PICC Protocol **AND** sodium chloride 0.9% **AND** sodium chloride 0.9%    Review of patient's allergies indicates:  No Known Allergies  Objective:     Vital Signs (Most Recent):  Temp: 98.2 °F (36.8 °C) (04/11/18 0751)  Pulse: 107 (04/11/18 1032)  Resp: 16 (04/11/18 0751)  BP: (!) 90/52 (04/11/18 0751)  SpO2: (!) 93 % (04/11/18 0751) Vital Signs (24h Range):  Temp:  [97.8 °F (36.6 °C)-98.8 °F (37.1 °C)] 98.2 °F (36.8 °C)  Pulse:  [106-115] 107  Resp:  [16-18] 16  SpO2:  [91 %-96 %] 93 %  BP: ()/(49-56) 90/52     Patient Vitals for the past 72 hrs (Last 3 readings):   Weight   04/10/18 0700 85.6 kg (188 lb 11.4 oz)   04/09/18 0903 88 kg (194 lb 0.1 oz)     Body mass index is 33.43 kg/m².      Intake/Output Summary (Last 24 hours) at 04/11/18 1033  Last data filed at 04/11/18 0600   Gross per 24 hour   Intake          1822.35 ml   Output             2050 ml   Net          -227.65 ml     Hemodynamic  Parameters:    Telemetry: NSVT    Physical Exam  Constitutional: He is oriented to person, place, and time.   Neck: JVD mid neck, improving  Cardiovascular: Normal rate and regular rhythm.  Exam reveals no gallop.    No murmur heard.  Pulmonary/Chest: Effort normal and breath sounds normal.   Abdominal: Soft. Bowel sounds are normal.   Musculoskeletal: He exhibits +1 edema and tenderness to the left proximal lateral shin.   Neurological: He is alert and oriented to person, place, and time.   Skin: Skin is warm and dry. Very wrinkled and scaling off on the left lateral aspect.   Nursing note and vitals reviewed.     Significant Labs:  CBC:    Recent Labs  Lab 04/09/18  0941 04/10/18  0639 04/11/18  0533   WBC 10.89 12.18 10.99   RBC 5.20 5.47 4.56*   HGB 12.9* 13.3* 11.3*   HCT 36.7* 38.3* 32.3*    217 352*   MCV 71* 70* 71*   MCH 24.8* 24.3* 24.8*   MCHC 35.1 34.7 35.0     BNP:    Recent Labs  Lab 04/09/18  0941   *     CMP:    Recent Labs  Lab 04/09/18  0746 04/10/18  0639 04/11/18  0533   GLU 77 82 163*   CALCIUM 9.8 9.6 9.7   ALBUMIN 2.2* 2.2* 2.2*   PROT 7.6 7.8 7.4   * 129* 128*   K 3.9 4.2 3.7   CO2 27 30* 32*   CL 88* 88* 87*   BUN 37* 47* 50*   CREATININE 1.5* 2.1* 1.8*   ALKPHOS 95 93 93   ALT 35 32 28   AST 41* 40 35   BILITOT 6.3* 5.7* 5.4*      Coagulation:   No results for input(s): PT, INR, APTT in the last 168 hours.  LDH:  No results for input(s): LDH in the last 72 hours.  Microbiology:  Microbiology Results (last 7 days)     Procedure Component Value Units Date/Time    Blood culture [[id number]] Collected:  04/05/18 1048    Order Status:  Completed Specimen:  Blood Updated:  04/07/18 1212     Blood Culture, Routine No Growth to date     Blood Culture, Routine No Growth to date     Blood Culture, Routine No Growth to date    Blood culture [[id number]] Collected:  04/05/18 1049    Order Status:  Completed Specimen:  Blood Updated:  04/07/18 1212     Blood Culture, Routine No Growth  to date     Blood Culture, Routine No Growth to date     Blood Culture, Routine No Growth to date          I have reviewed all pertinent labs within the past 24 hours.    Estimated Creatinine Clearance: 59.1 mL/min (A) (based on SCr of 1.8 mg/dL (H)).    Diagnostic Results:        Assessment and Plan:     No notes on file    * Acute decompensated heart failure    - NICMP with significant congestion (T bili at 5.4)  - [facility]  - resume oral lasix, cr improving  - Strict I/Os and Daily weights  - Valsartan and aldactone held due to hypotension  - Holding BB in setting of  gtt   - PT / OT consulted for aggressive ambulation   - Currently not planning for any invasive procedures so restarted apixaban for recent PE (RHC completed)  - Not likely candidate for advanced options due to poor social situation/support  - PICC line in place in R arm    RHC 4/10/18  BP: 104/68, 80  RA: 3  RV: 40/3  PA: 40/24, 29  PCWP: 13  TP  PVR: ~5  SVR: 1919  PA sat: 55%  AO sat: 98%  PCW sat: 82%  Ananda CO/CI: 3.21/1.71         Transaminitis    - possibly from hepatic congestion but worsening with Diuresis  - US normal for gall bladder and liver  - talked to hepatology. T bili now downtrending   - direct bili 4   - will consider Trans jugular biopsy at a later time. Will discuss with staff about vitamin K.         Acute left ankle pain    - CT left leg positive for cellulitic changes.   - Positive for pain and leukocytosis  - de-escalate vanc to doxy and keflex        History of pulmonary embolism    - [facility]  - Continue Apixaban 5mg BID (held for RHC, restarted)  - Repeat CT PE study (done before transfer) negative            PAULETTE SawyerC  Heart Transplant  [facility]

## 2018-04-11 NOTE — PLAN OF CARE
Problem: Patient Care Overview  Goal: Plan of Care Review  Outcome: Ongoing (interventions implemented as appropriate)  Patient remained free from falls/injury/trauma throughout shift. No complaints of chest pain, SOB, or discomfort. VSS.  gtt continuing to infuse at 5 mcg. PICC line was placed yesterday. SVO2 66 this morning. Switched to PO abx. Plan of care reviewed with patient. Patient verbalized understanding. All questions encouraged and answered. Will continue to monitor.

## 2018-04-11 NOTE — PHYSICIAN QUERY
PT Name: Levon Arizmendi  MR #: [id number]     Physician Query Form - Documentation Clarification      CDS/: Jeannette Martin               Contact information:    This form is a permanent document in the medical record.     Query Date: April 11, 2018    By submitting this query, we are merely seeking further clarification of documentation. Please utilize your independent clinical judgment when addressing the question(s) below.    The Medical record reflects the following:    Supporting Clinical Findings Location in Medical Record   Mild pulmonary hypertension      CONCLUSIONS     1 - Severe left ventricular enlargement.     2 - Severely depressed left ventricular systolic function (EF mid 20s).     3 - Eccentric hypertrophy.     4 - Right ventricular enlargement with low normal to mildly depressed systolic function.     5 - Biatrial enlargement.     6 - Mild to moderate mitral regurgitation.     7 - Moderate to severe tricuspid regurgitation.     8 - Trivial to mild pulmonic regurgitation.     9 - Increased central venous pressure.     10 - Pulmonary hypertension. The estimated PA systolic pressure is 49 mmHg.  Transplant Op Note 4/10      2D Echo 4/1                 Please specify the type of Pulmonary Hypertension:                                                                 Doctor, Please specify diagnosis or diagnoses associated with above clinical findings.    Provider Use Only    [     ] Primary pulmonary hypertension (Group 1)  [     ] Other Secondary pulmonary hypertension (Group 5)  [     ] Secondary pulmonary arterial hypertension due to: _______________  [ x    ] Pulmonary hypertension due to Left heart disease (Group 2)  [     ] Pulmonary hypertension due to Lung disease and hypoxia (Group 3)  [     ] Chronic thromboembolic pulmonary hypertension (Group 4)  [     ] Pulmonary hypertension, unspecified   [     ] Other: ___________________                                                                                                          [  ] Clinically undetermined

## 2018-04-11 NOTE — NURSING
Per Dr Palacios, will plan to send pt home on .   He will f/u in HF section in approx 2 weeks.   appts made and hand delivered to pt at bedside.     HF nurse given report.

## 2018-04-11 NOTE — SUBJECTIVE & OBJECTIVE
Interval History: No complaints. Plan to d/c tomorrow if patient can learn  pump.    Continuous Infusions:   DOBUTamine 5 mcg/kg/min (04/11/18 0048)     Scheduled Meds:   apixaban  5 mg Oral BID    cephALEXin  500 mg Oral Q8H    doxycycline  100 mg Oral Q12H    furosemide  80 mg Oral BID    gabapentin  400 mg Oral TID    latanoprost  1 drop Left Eye QHS    magnesium oxide  800 mg Oral BID    magnesium oxide  800 mg Oral Once    pantoprazole  40 mg Oral Daily    sodium chloride 0.9%  10 mL Intravenous Q6H    sodium chloride 0.9%  3 mL Intravenous Q8H    spironolactone  25 mg Oral Daily     PRN Meds:acetaminophen, docusate sodium, hydrocodone-acetaminophen 5-325mg, Flushing PICC Protocol **AND** sodium chloride 0.9% **AND** sodium chloride 0.9%    Review of patient's allergies indicates:  No Known Allergies  Objective:     Vital Signs (Most Recent):  Temp: 98.2 °F (36.8 °C) (04/11/18 0751)  Pulse: 107 (04/11/18 1032)  Resp: 16 (04/11/18 0751)  BP: (!) 90/52 (04/11/18 0751)  SpO2: (!) 93 % (04/11/18 0751) Vital Signs (24h Range):  Temp:  [97.8 °F (36.6 °C)-98.8 °F (37.1 °C)] 98.2 °F (36.8 °C)  Pulse:  [106-115] 107  Resp:  [16-18] 16  SpO2:  [91 %-96 %] 93 %  BP: ()/(49-56) 90/52     Patient Vitals for the past 72 hrs (Last 3 readings):   Weight   04/10/18 0700 85.6 kg (188 lb 11.4 oz)   04/09/18 0903 88 kg (194 lb 0.1 oz)     Body mass index is 33.43 kg/m².      Intake/Output Summary (Last 24 hours) at 04/11/18 1033  Last data filed at 04/11/18 0600   Gross per 24 hour   Intake          1822.35 ml   Output             2050 ml   Net          -227.65 ml     Hemodynamic Parameters:    Telemetry: NSVT    Physical Exam  Constitutional: He is oriented to person, place, and time.   Neck: JVD mid neck, improving  Cardiovascular: Normal rate and regular rhythm.  Exam reveals no gallop.    No murmur heard.  Pulmonary/Chest: Effort normal and breath sounds normal.   Abdominal: Soft. Bowel sounds are normal.    Musculoskeletal: He exhibits +1 edema and tenderness to the left proximal lateral shin.   Neurological: He is alert and oriented to person, place, and time.   Skin: Skin is warm and dry. Very wrinkled and scaling off on the left lateral aspect.   Nursing note and vitals reviewed.     Significant Labs:  CBC:    Recent Labs  Lab 04/09/18  0941 04/10/18  0639 04/11/18  0533   WBC 10.89 12.18 10.99   RBC 5.20 5.47 4.56*   HGB 12.9* 13.3* 11.3*   HCT 36.7* 38.3* 32.3*    217 352*   MCV 71* 70* 71*   MCH 24.8* 24.3* 24.8*   MCHC 35.1 34.7 35.0     BNP:    Recent Labs  Lab 04/09/18  0941   *     CMP:    Recent Labs  Lab 04/09/18  0746 04/10/18  0639 04/11/18  0533   GLU 77 82 163*   CALCIUM 9.8 9.6 9.7   ALBUMIN 2.2* 2.2* 2.2*   PROT 7.6 7.8 7.4   * 129* 128*   K 3.9 4.2 3.7   CO2 27 30* 32*   CL 88* 88* 87*   BUN 37* 47* 50*   CREATININE 1.5* 2.1* 1.8*   ALKPHOS 95 93 93   ALT 35 32 28   AST 41* 40 35   BILITOT 6.3* 5.7* 5.4*      Coagulation:   No results for input(s): PT, INR, APTT in the last 168 hours.  LDH:  No results for input(s): LDH in the last 72 hours.  Microbiology:  Microbiology Results (last 7 days)     Procedure Component Value Units Date/Time    Blood culture [[id number]] Collected:  04/05/18 1048    Order Status:  Completed Specimen:  Blood Updated:  04/07/18 1212     Blood Culture, Routine No Growth to date     Blood Culture, Routine No Growth to date     Blood Culture, Routine No Growth to date    Blood culture [[id number]] Collected:  04/05/18 1049    Order Status:  Completed Specimen:  Blood Updated:  04/07/18 1212     Blood Culture, Routine No Growth to date     Blood Culture, Routine No Growth to date     Blood Culture, Routine No Growth to date          I have reviewed all pertinent labs within the past 24 hours.    Estimated Creatinine Clearance: 59.1 mL/min (A) (based on SCr of 1.8 mg/dL (H)).    Diagnostic Results:

## 2018-04-11 NOTE — ASSESSMENT & PLAN NOTE
- [place] [facility] [place]  - Continue Apixaban 5mg BID (held for RHC, restarted)  - Repeat CT PE study (done before transfer) negative

## 2018-04-11 NOTE — PLAN OF CARE
Problem: Patient Care Overview  Goal: Plan of Care Review  Outcome: Ongoing (interventions implemented as appropriate)  Pt verbalized understanding of plan of care. Fall precautions maintained.  Bed locked and low.  Call light and personal items within reach. SR up x 2. Dobutamine gtt infusing as ordered, Intake and output closely monitored. ST on tele monitor, -113.   Pt refused to OOB to sit in chair d/t weakness and pain to LLE.  Offered pain medication but refused.  Encouraged to to reposition frequently while in bed.   Descalated to oral antibiotics for LLE cellulitis.

## 2018-04-11 NOTE — PLAN OF CARE
Problem: Patient Care Overview  Goal: Plan of Care Review  Outcome: Ongoing (interventions implemented as appropriate)  Pt verbalized understanding of plan of care. Hand washing/ hand sanitizers encouraged.Fall precautions maintained.  Bed locked and low.  Call light and personal items within reach. SR up x 2. Dobutamine gtt infusing as ordered. Intake and output closely monitored. Pt refused to be weighed on standard scale, Luisana HUMPHREY aware.  Refused to get OOB, encouraged to reposition frequently while in bed.

## 2018-04-11 NOTE — ASSESSMENT & PLAN NOTE
- NICMP with significant congestion (T bili at 5.4)  - Continue  gtt at 5mcg as current  - resume oral lasix, cr improving  - Strict I/Os and Daily weights  - Valsartan and aldactone held due to hypotension  - Holding BB in setting of  gtt   - PT / OT consulted for aggressive ambulation   - Currently not planning for any invasive procedures so restarted apixaban for recent PE (RHC completed)  - Not likely candidate for advanced options due to poor social situation/support  - PICC line in place in R arm    RHC 4/10/18  BP: 104/68, 80  RA: 3  RV: 40/3  PA: 40/24, 29  PCWP: 13  TP  PVR: ~5  SVR: 1919  PA sat: 55%  AO sat: 98%  PCW sat: 82%  Ananda CO/CI: 3.21/1.71

## 2018-04-11 NOTE — ASSESSMENT & PLAN NOTE
- possibly from hepatic congestion but worsening with Diuresis  - US normal for gall bladder and liver  - talked to hepatology. T bili now downtrending   - direct bili 4   - will consider Trans jugular biopsy at a later time. Will discuss with staff about vitamin K.

## 2018-04-12 NOTE — PROGRESS NOTES
[facility]  Heart Transplant  Progress Note    Patient Name: Levon Arizmendi  MRN: [id number]  Admission Date: 3/30/2018  Hospital Length of Stay: 13 days  Attending Physician: Soila Palacios MD  Primary Care Provider: Shane Alonso MD  Principal Problem:Acute decompensated heart failure    Subjective:     Interval History: patient with no issues overnight, was educated on  pump    Continuous Infusions:   DOBUTamine 5 mcg/kg/min (04/12/18 0952)     Scheduled Meds:   apixaban  5 mg Oral BID    cephALEXin  500 mg Oral Q8H    doxycycline  100 mg Oral Q12H    furosemide  80 mg Oral BID    gabapentin  400 mg Oral TID    latanoprost  1 drop Left Eye QHS    magnesium oxide  800 mg Oral BID    magnesium oxide  800 mg Oral Once    pantoprazole  40 mg Oral Daily    sodium chloride 0.9%  10 mL Intravenous Q6H    sodium chloride 0.9%  3 mL Intravenous Q8H    spironolactone  25 mg Oral Daily     PRN Meds:acetaminophen, docusate sodium, hydrocodone-acetaminophen 5-325mg, Flushing PICC Protocol **AND** sodium chloride 0.9% **AND** sodium chloride 0.9%    Review of patient's allergies indicates:  No Known Allergies  Objective:     Vital Signs (Most Recent):  Temp: 97.6 °F (36.4 °C) (04/12/18 0931)  Pulse: (!) 115 (04/12/18 0931)  Resp: 16 (04/12/18 0931)  BP: (!) 103/51 (04/12/18 0931)  SpO2: 97 % (04/12/18 0931) Vital Signs (24h Range):  Temp:  [97.6 °F (36.4 °C)-98.8 °F (37.1 °C)] 97.6 °F (36.4 °C)  Pulse:  [103-119] 115  Resp:  [15-20] 16  SpO2:  [95 %-97 %] 97 %  BP: ()/(48-57) 103/51     Patient Vitals for the past 72 hrs (Last 3 readings):   Weight   04/10/18 0700 85.6 kg (188 lb 11.4 oz)     Body mass index is 33.43 kg/m².      Intake/Output Summary (Last 24 hours) at 04/12/18 1038  Last data filed at 04/12/18 0954   Gross per 24 hour   Intake             1580 ml   Output             1860 ml   Net             -280 ml       Hemodynamic Parameters:           Physical Exam    Constitutional: He is oriented to person, place, and time. He appears well-developed and well-nourished. No distress.   HENT:   Mouth/Throat: Oropharynx is clear and moist.   Eyes: Scleral icterus is present.   Cardiovascular: Normal rate and regular rhythm.    Pulmonary/Chest: Effort normal and breath sounds normal.   Abdominal: Soft. Bowel sounds are normal. He exhibits no distension and no mass. There is no tenderness. There is no guarding.   Musculoskeletal: He exhibits edema. He exhibits no deformity.   Neurological: He is alert and oriented to person, place, and time.   Skin: Skin is warm and dry.   jaundiced   Psychiatric: He has a normal mood and affect.   Vitals reviewed.      Significant Labs:  CBC:    Recent Labs  Lab 04/11/18  0533 04/11/18  1210 04/12/18  0355   WBC 10.99 11.01 10.07   RBC 4.56* 4.48* 4.53*   HGB 11.3* 11.2* 10.9*   HCT 32.3* 31.8* 32.3*   * 378* 437*   MCV 71* 71* 71*   MCH 24.8* 25.0* 24.1*   MCHC 35.0 35.2 33.7     BNP:    Recent Labs  Lab 04/09/18  0941   *     CMP:    Recent Labs  Lab 04/10/18  0639 04/11/18  0533 04/12/18  0355   GLU 82 163* 79   CALCIUM 9.6 9.7 9.9   ALBUMIN 2.2* 2.2* 2.3*   PROT 7.8 7.4 7.5   * 128* 132*   K 4.2 3.7 4.3   CO2 30* 32* 33*   CL 88* 87* 88*   BUN 47* 50* 55*   CREATININE 2.1* 1.8* 1.9*   ALKPHOS 93 93 99   ALT 32 28 28   AST 40 35 37   BILITOT 5.7* 5.4* 4.3*      Coagulation:   No results for input(s): PT, INR, APTT in the last 168 hours.  LDH:  No results for input(s): LDH in the last 72 hours.  Microbiology:  Microbiology Results (last 7 days)     Procedure Component Value Units Date/Time    Blood culture [[id number]] Collected:  04/05/18 1049    Order Status:  Completed Specimen:  Blood Updated:  04/10/18 1212     Blood Culture, Routine No growth after 5 days.    Blood culture [[id number]] Collected:  04/05/18 1048    Order Status:  Completed Specimen:  Blood Updated:  04/10/18 1212     Blood Culture, Routine No growth after  5 days.          I have reviewed all pertinent labs within the past 24 hours.    Estimated Creatinine Clearance: 56 mL/min (A) (based on SCr of 1.9 mg/dL (H)).    Diagnostic Results:  I have reviewed and interpreted all pertinent imaging results/findings within the past 24 hours.    Assessment and Plan:     No notes on file    * Acute decompensated heart failure    - NICMP with significant congestion   - Continue  gtt at 5mcg as current  - resume oral lasix, cr improving  - Strict I/Os and Daily weights  - Holding BB in setting of  gtt   - PT / OT consulted for aggressive ambulation   - Currently not planning for any invasive procedures so restarted apixaban for recent PE (RHC completed)  - Not likely candidate for advanced options due to poor social situation/support  - PICC line in place in R arm    RHC 4/10/18  BP: 104/68, 80  RA: 3  RV: 40/3  PA: 40/24, 29  PCWP: 13  TP  PVR: ~5  SVR: 1919  PA sat: 55%  AO sat: 98%  PCW sat: 82%  Ananda CO/CI: 3.21/1.71         Transaminitis    - possibly from hepatic congestion but worsening with Diuresis  - US normal for gall bladder and liver  - talked to hepatology. T bili now downtrending           Acute left ankle pain    - CT left leg positive for cellulitic changes.   - Positive for pain and leukocytosis  - de-escalate vanc to doxy and keflex, will dc home with 5 more days        History of pulmonary embolism    - [place] [facility] [place]  - Continue Apixaban 5mg BID (held for RHC, restarted)  - Repeat CT PE study (done before transfer) negative            MILAGRO Arroyo  Heart Transplant  [facility]

## 2018-04-12 NOTE — DISCHARGE SUMMARY
[facility]  Heart Transplant  Discharge Summary      Patient Name: Levon Arizmendi  MRN: [id number]  Admission Date: 3/30/2018  Hospital Length of Stay: 13 days  Discharge Date and Time: 04/12/2018 2:15 PM  Attending Physician: Soila Palacios MD   Discharging Provider: MILAGRO rAroyo  Primary Care Provider: Shane Alonso MD     HPI: Mr. Arizmendi is a 29yo gentleman with a past medical history of NICM (HFrEF-EF 20%, LVEDD 7.1)) s/p [place] [employer] dcICD (12/6/2017), KAMILLE, obesity, HTN, HLD who was transferred from [facility] for ADHF and cardiogenic shock. He initially had worsening LE edema for the prior few months since they discontinued his Entresto. His legs have gotten so swollen that he has severe pain in his left leg about a week ago, and progressed to the right leg a few days after. He was admitted and started on bumex IV. He developed worsening HAI and thus he was transferred to the ICU for  gtt. He was transferred to [facility] for further management of his ADHF.       Procedure(s) (LRB):  Right heart cath (N/A)     Hospital Course: Patient was admitted to \[facility]"" and diuresed aggressively with lasix gtt and diuril.  was added for diuresis and low output. His apixaban was continued for history of PE. Liver US done unremarkable in setting of elevated bilirubin. Was down trending at time of discharge. GDMT with aldactone 25 mg daily. No addition of ACE due to hypotension. Patient recently was evicted and unfortunately possibly homeless therefore we were unable to offer LVAD or OHTx. He will discharge to go locally weekly to care point with  infusion.     Consults         Status Ordering Provider     Inpatient consult to Hepatology  Once     Provider:  (Not yet assigned)    Completed EDWARD MOULTON     Inpatient consult to Midline team  Once     Provider:  (Not yet assigned)    Completed TIFFANIE PARIKH     [facility]  Once     Provider:  (Not yet  "assigned)    Completed ROBERT DOUGLASS          Significant Diagnostic Studies: Labs:   BMP:   Recent Labs  Lab 04/11/18  0533 04/11/18  1740 04/12/18  0355   *  --  79   *  --  132*   K 3.7  --  4.3   CL 87*  --  88*   CO2 32*  --  33*   BUN 50*  --  55*   CREATININE 1.8*  --  1.9*   CALCIUM 9.7  --  9.9   MG 1.9 1.8 2.0       Pending Diagnostic Studies:     Procedure Component Value Units Date/Time    CBC auto differential [[id number]] Collected:  04/11/18 0910    Order Status:  Sent Lab Status:  In process Updated:  04/11/18 0911    Specimen:  Blood from Blood     CT Knee With Contrast Left [[id number]]     Order Status:  Sent Lab Status:  No result         Final Active Diagnoses:    Diagnosis Date Noted POA    PRINCIPAL PROBLEM:  Acute decompensated heart failure [I50.9] 03/29/2018 Yes    Transaminitis [R74.0] 04/08/2018 Yes    Acute left ankle pain [M25.572] 04/02/2018 Yes    History of pulmonary embolism [Z86.711] 03/31/2018 Unknown    KAMILLE (obstructive sleep apnea) [G47.33] 08/28/2017 Yes    Morbid obesity due to excess calories [E66.01] 08/28/2017 Yes      Problems Resolved During this Admission:    Diagnosis Date Noted Date Resolved POA    HAI (acute kidney injury) [N17.9] 03/31/2018 04/01/2018 Unknown      Discharged Condition: good    Disposition:   Clinic 2 weeks  Diet normal provisions none   Follow Up:  2 weeks   Patient Instructions:     WHEELCHAIR FOR HOME USE   Order Specific Question Answer Comments   Hours in W/C per day: 24    Type of Wheelchair: Standard    Size(Width): 18"(STD adult)    Leg Support: Swing Away    Lap Belt: Velcro    Cushion: Basic    Height: 5' 3" (1.6 m)    Weight: 85.6    Does patient have medical equipment at home? CPAP    Length of need (1-99 months): 99    Please check all that apply: Patient's upper body strength is sufficient for propulsion.    Please check all that apply: The patient requires the use of a w/c for activities of daily living within " "the Home.    Please check all that apply: Patient mobility limitations cannot be sufficiently resolved by the use of other ambulatory therapies.      WALKER FOR HOME USE   Order Specific Question Answer Comments   Type of Walker: Adult (5'4"-6'6")    With wheels? Yes    Height: 5' 3" (1.6 m)    Weight: 85.6    Length of need (1-99 months): 99    Does patient have medical equipment at home? CPAP    Please check all that apply: Patient's condition impairs ambulation.    Please check all that apply: Walker will be used for gait training.      TRANSFER TUB BENCH FOR HOME USE   Order Specific Question Answer Comments   Type of Transfer Tub Bench: Unpadded    Height: 5' 3" (1.6 m)    Weight: 85.6    Does patient have medical equipment at home? CPAP    Length of need (1-99 months): 99      COMMODE FOR HOME USE   Order Specific Question Answer Comments   Type: Standard    Height: 5' 3" (1.6 m)    Weight: 85.6    Does patient have medical equipment at home? CPAP    Length of need (1-99 months): 99      CRUTCHES FOR HOME USE   Order Specific Question Answer Comments   Type: Axillary    Height: 5' 3" (1.6 m)    Weight: 85 kg    Does patient have medical equipment at home? CPAP    Length of need (1-99 months): 12        Medications:  Reconciled Home Medications:      Medication List      START taking these medications    apixaban 5 mg Tab  Take 1 tablet (5 mg total) by mouth 2 (two) times daily.     cephALEXin 500 MG capsule  Commonly known as:  KEFLEX  Take 1 capsule (500 mg total) by mouth every 8 (eight) hours.     DOBUTamine 500 mg/250 mL (2,000 mcg/mL)  Commonly known as:  DOBUTREX  Inject 428 mcg/min into the vein continuous.     doxycycline 100 MG tablet  Commonly known as:  VIBRA-TABS  Take 1 tablet (100 mg total) by mouth every 12 (twelve) hours.     magnesium oxide 400 mg tablet  Commonly known as:  MAG-OX  Take 2 tablets (800 mg total) by mouth 2 (two) times daily.        CHANGE how you take these medications  "   furosemide 80 MG tablet  Commonly known as:  LASIX  Take 1 tablet (80 mg total) by mouth 2 (two) times daily.  What changed:  medication strength     pantoprazole 40 MG tablet  Commonly known as:  PROTONIX  Take 1 tablet (40 mg total) by mouth once daily.  What changed:  additional instructions        CONTINUE taking these medications    gabapentin 400 MG capsule  Commonly known as:  NEURONTIN  Take 1 capsule (400 mg total) by mouth 3 (three) times daily.     latanoprost 0.005 % ophthalmic solution  Apply 1 drop to eye every evening.     spironolactone 25 MG tablet  Commonly known as:  ALDACTONE  Take 1 tablet (25 mg total) by mouth once daily.        STOP taking these medications    aspirin 81 MG Chew     carvedilol 12.5 MG tablet  Commonly known as:  COREG     hydrALAZINE 25 MG tablet  Commonly known as:  APRESOLINE     isosorbide dinitrate 20 MG tablet  Commonly known as:  ISORDIL     loratadine 10 mg tablet  Commonly known as:  CLARITIN     nitroGLYCERIN 0.4 MG SL tablet  Commonly known as:  NITROSTAT     potassium chloride SA 20 MEQ tablet  Commonly known as:  K-DUR,KLOR-CON     sacubitril-valsartan  mg per tablet  Commonly known as:  MILAGRO Rubi  Heart Transplant  [facility]

## 2018-04-12 NOTE — HPI
Mr. Arizmendi is a 27yo gentleman with a past medical history of NICM (HFrEF-EF 20%, LVEDD 7.1)) s/p [place] [employer] dcICD (12/6/2017), KAMILLE, obesity, HTN, HLD who was transferred from [facility] for ADHF and cardiogenic shock. He initially had worsening LE edema for the prior few months since they discontinued his Entresto. His legs have gotten so swollen that he has severe pain in his left leg about a week ago, and progressed to the right leg a few days after. He was admitted and started on bumex IV. He developed worsening HAI and thus he was transferred to the ICU for  gtt. [place] [facility] [place].

## 2018-04-12 NOTE — SUBJECTIVE & OBJECTIVE
Interval History: patient with no issues overnight, was educated on  pump    Continuous Infusions:   DOBUTamine 5 mcg/kg/min (04/12/18 0952)     Scheduled Meds:   apixaban  5 mg Oral BID    cephALEXin  500 mg Oral Q8H    doxycycline  100 mg Oral Q12H    furosemide  80 mg Oral BID    gabapentin  400 mg Oral TID    latanoprost  1 drop Left Eye QHS    magnesium oxide  800 mg Oral BID    magnesium oxide  800 mg Oral Once    pantoprazole  40 mg Oral Daily    sodium chloride 0.9%  10 mL Intravenous Q6H    sodium chloride 0.9%  3 mL Intravenous Q8H    spironolactone  25 mg Oral Daily     PRN Meds:acetaminophen, docusate sodium, hydrocodone-acetaminophen 5-325mg, Flushing PICC Protocol **AND** sodium chloride 0.9% **AND** sodium chloride 0.9%    Review of patient's allergies indicates:  No Known Allergies  Objective:     Vital Signs (Most Recent):  Temp: 97.6 °F (36.4 °C) (04/12/18 0931)  Pulse: (!) 115 (04/12/18 0931)  Resp: 16 (04/12/18 0931)  BP: (!) 103/51 (04/12/18 0931)  SpO2: 97 % (04/12/18 0931) Vital Signs (24h Range):  Temp:  [97.6 °F (36.4 °C)-98.8 °F (37.1 °C)] 97.6 °F (36.4 °C)  Pulse:  [103-119] 115  Resp:  [15-20] 16  SpO2:  [95 %-97 %] 97 %  BP: ()/(48-57) 103/51     Patient Vitals for the past 72 hrs (Last 3 readings):   Weight   04/10/18 0700 85.6 kg (188 lb 11.4 oz)     Body mass index is 33.43 kg/m².      Intake/Output Summary (Last 24 hours) at 04/12/18 1038  Last data filed at 04/12/18 0954   Gross per 24 hour   Intake             1580 ml   Output             1860 ml   Net             -280 ml       Hemodynamic Parameters:           Physical Exam   Constitutional: He is oriented to person, place, and time. He appears well-developed and well-nourished. No distress.   HENT:   Mouth/Throat: Oropharynx is clear and moist.   Eyes: Scleral icterus is present.   Cardiovascular: Normal rate and regular rhythm.    Pulmonary/Chest: Effort normal and breath sounds normal.   Abdominal: Soft.  Bowel sounds are normal. He exhibits no distension and no mass. There is no tenderness. There is no guarding.   Musculoskeletal: He exhibits edema. He exhibits no deformity.   Neurological: He is alert and oriented to person, place, and time.   Skin: Skin is warm and dry.   jaundiced   Psychiatric: He has a normal mood and affect.   Vitals reviewed.      Significant Labs:  CBC:    Recent Labs  Lab 04/11/18  0533 04/11/18  1210 04/12/18  0355   WBC 10.99 11.01 10.07   RBC 4.56* 4.48* 4.53*   HGB 11.3* 11.2* 10.9*   HCT 32.3* 31.8* 32.3*   * 378* 437*   MCV 71* 71* 71*   MCH 24.8* 25.0* 24.1*   MCHC 35.0 35.2 33.7     BNP:    Recent Labs  Lab 04/09/18  0941   *     CMP:    Recent Labs  Lab 04/10/18  0639 04/11/18  0533 04/12/18  0355   GLU 82 163* 79   CALCIUM 9.6 9.7 9.9   ALBUMIN 2.2* 2.2* 2.3*   PROT 7.8 7.4 7.5   * 128* 132*   K 4.2 3.7 4.3   CO2 30* 32* 33*   CL 88* 87* 88*   BUN 47* 50* 55*   CREATININE 2.1* 1.8* 1.9*   ALKPHOS 93 93 99   ALT 32 28 28   AST 40 35 37   BILITOT 5.7* 5.4* 4.3*      Coagulation:   No results for input(s): PT, INR, APTT in the last 168 hours.  LDH:  No results for input(s): LDH in the last 72 hours.  Microbiology:  Microbiology Results (last 7 days)     Procedure Component Value Units Date/Time    Blood culture [[id number]] Collected:  04/05/18 1049    Order Status:  Completed Specimen:  Blood Updated:  04/10/18 1212     Blood Culture, Routine No growth after 5 days.    Blood culture [[id number]] Collected:  04/05/18 1048    Order Status:  Completed Specimen:  Blood Updated:  04/10/18 1212     Blood Culture, Routine No growth after 5 days.          I have reviewed all pertinent labs within the past 24 hours.    Estimated Creatinine Clearance: 56 mL/min (A) (based on SCr of 1.9 mg/dL (H)).    Diagnostic Results:  I have reviewed and interpreted all pertinent imaging results/findings within the past 24 hours.

## 2018-04-12 NOTE — PROGRESS NOTES
DISCHARGE    Pt presents in room alone, aaox4 with pleasant affect. Pt states in agreement with plan to discharge to home of brother Zhanna today, [address], [place] [address], with [facility] full-service. Per [facility], pt was able to learn how to use the pump and did good teach back. Pt will go to their [facility] which will also train pt's aunt. Crutches delivered to pt's room. Pt will transport via Medicaid,  [phone] for 2pm scheduled . Reservation #[id number] per Miriam. Pt being given clothes to wear home. Pt reports coping well and denies further needs, questions, concerns at this time and none indicated. Providing psychosocial and counseling support, education, resources, assistance and discharge planning as indicated. SW remains available.

## 2018-04-12 NOTE — NURSING
Patient is ready for discharge. Patient stable alert and oriented. Tele removed. Right UA PICC connected to home  . Crutches delivered to bedside. Home medications delivered to bedside by [facility]. No complaints of pain. Discussed discharge plan. Reviewed medications and side effects, appointments, and answered questions with patient. Patient wheeled to font entrance with Windy RN , and Jake TRAN.[facility] star female personnel picked up patient from [facility] hospital front entrance. Patient safely boarded van. With all personnel belongings and medications.

## 2018-04-12 NOTE — CARE UPDATE
Chart check completed, abnormal VS noted (BP 80/48), bedside RNRadha contacted and RN to recheck VS and document.  No concerns verbalized at this time, instructed to call [phone] for further concerns or assistance.    Vitals:    04/11/18 2249   BP: (!) 100/52   Pulse: (!) 113   Resp: 16   Temp: 98.2 °F (36.8 °C)

## 2018-04-13 NOTE — PHYSICIAN QUERY
PT Name: Levon Arizmendi  MR #: [id number]     Physician Query Form - Diagnosis Clarification      CDS/: Jeannette Martin               Contact information: [email or web address]     This form is a permanent document in the medical record.     Query Date: April 13, 2018    By submitting this query, we are merely seeking further clarification of documentation.  Please utilize your independent clinical judgment when addressing the question(s) below.     The medical record contains the following:      Findings Supporting Clinical Information Location in Medical Record   Recommend giving Vit K 10mg IV x1 as he may be cholestatic and has an elevated INR           Patient with severe heart failure developed cholestasis In fact, t bili is trending down. Likely due to hepatic congestion secondary to heart failure. However, cannot entirely rule out fibrosis/cirrhosis as chronic hepatic congestion can lead to fibrosis. If bilirubin continues to rise, would obtain TJ liver biopsy with hepatic venogram.    Hepatomegaly and hepatic steatosis.    Biliary system: The common duct is not dilated, measuring 2 mm.  No intrahepatic ductal dilatation.    Liver US done unremarkable in setting of elevated bilirubin. Was down trending at time of discharge.  Hepatology Consult 4/9      Hepatology Consult 4/9                  Abdominal US 4/5    Abdominal US 4/5        DC Summary 4/12     Please clarify if the ___________________________ diagnosis has been:    [  ] Ruled In  [  ] Ruled In, Now Resolved  [  ] Ruled Out  [x  ] Clinically insignificant  [  ] Clinically undetermined  [ ] Other/Clarification of findings (please specify)_______________________________    Please document in your progress notes daily for the duration of treatment, until resolved, and include in your discharge summary.

## 2018-04-13 NOTE — PT/OT/SLP DISCHARGE
Physical Therapy Discharge Summary    Name: Levon Arizmendi  MRN: [id number]   Principal Problem: Acute decompensated heart failure     Patient Discharged from acute Physical Therapy on 18.  Please refer to prior PT noted date on 18 for functional status.     Assessment:     Goals partially met.    Objective:     GOALS:    Physical Therapy Goals     Not on file          Multidisciplinary Problems (Resolved)        Problem: Physical Therapy Goal    Goal Priority Disciplines Outcome Goal Variances Interventions   Physical Therapy Goal   (Resolved)     PT/OT, PT Outcome(s) achieved     Description:  Goals to be met by: 2018    Patient will increase functional independence with mobility by performin. Supine to sit with MInimal Assistance - MET 2018   Revised: supine to sit with Mod (I)  2. Sit to supine with MInimal Assistance - MET 2018   Revised: sit to supine with mod (I)  3. Sit to stand transfer with Minimal Assistance with LRAD - MET 2018  4. Bed to chair transfer with Minimal Assistance using LRAD - MET 2018   Revised: bed to chair transfer with SBA using LRAD  5. Gait  x 50 feet with Minimal Assistance using LRAD - not met  6. Lower extremity exercise program x15 reps per handout, with supervision - not met                         Reasons for Discontinuation of Therapy Services  Transfer to alternate level of care.      Plan:     Patient Discharged to: Home with Home Health Service recommended for continued progress..    Rosemarie Evans, PT  2018

## 2018-04-16 NOTE — PHYSICIAN QUERY
PT Name: Levon Arizmendi  MR #: [id number]     Physician Query Form - Diagnosis Clarification      CDS/: Jeannette Martin               Contact information: [email or web address]     This form is a permanent document in the medical record.     Query Date: April 16, 2018    By submitting this query, we are merely seeking further clarification of documentation.  Please utilize your independent clinical judgment when addressing the question(s) below.     The medical record contains the following:      Findings Supporting Clinical Information Location in Medical Record   Recommend giving Vit K 10mg IV x1 as he may be cholestatic and has an elevated INR           Patient with severe heart failure developed cholestasis In fact, t bili is trending down. Likely due to hepatic congestion secondary to heart failure. However, cannot entirely rule out fibrosis/cirrhosis as chronic hepatic congestion can lead to fibrosis. If bilirubin continues to rise, would obtain TJ liver biopsy with hepatic venogram.    Hepatomegaly and hepatic steatosis.    Biliary system: The common duct is not dilated, measuring 2 mm.  No intrahepatic ductal dilatation.    Liver US done unremarkable in setting of elevated bilirubin. Was down trending at time of discharge.  Hepatology Consult 4/9      Hepatology Consult 4/9                  Abdominal US 4/5    Abdominal US 4/5        DC Summary 4/12     Please clarify if the ___cholestasis______________ diagnosis has been:    [  ] Ruled In  [  ] Ruled In, Now Resolved  [  ] Ruled Out  [  ] Clinically insignificant  [ x ] Clinically undetermined  [ ] Other/Clarification of findings (please specify)_______________________________    Please document in your progress notes daily for the duration of treatment, until resolved, and include in your discharge summary.

## 2018-04-25 PROBLEM — E87.6 DIURETIC-INDUCED HYPOKALEMIA: Status: ACTIVE | Noted: 2018-01-01

## 2018-04-25 PROBLEM — I50.9 ACUTE DECOMPENSATED HEART FAILURE: Status: RESOLVED | Noted: 2018-01-01 | Resolved: 2018-01-01

## 2018-04-25 PROBLEM — Z76.82 HEART TRANSPLANT CANDIDATE: Status: ACTIVE | Noted: 2018-01-01

## 2018-04-25 PROBLEM — T50.2X5A DIURETIC-INDUCED HYPOKALEMIA: Status: ACTIVE | Noted: 2018-01-01

## 2018-04-25 PROBLEM — I26.99 ACUTE PULMONARY EMBOLISM: Status: RESOLVED | Noted: 2018-01-01 | Resolved: 2018-01-01

## 2018-04-25 NOTE — LETTER
April 25, 2018        Shane Alonso  [address] [address]  Phone: [phone]  Fax: [phone]             [facility] [address]  [place] [address]  Phone: [phone]   Patient: Levon Arizmendi   MR Number: [id number]   YOB: 1990   Date of Visit: 4/25/2018       Dear Dr. Shane Alonso    Thank you for referring Levon Arizmendi to me for evaluation. Attached you will find relevant portions of my assessment and plan of care.    If you have questions, please do not hesitate to call me. I look forward to following Levon Arizmendi along with you.    Sincerely,    Omid Lizarraga MD    Enclosure    If you would like to receive this communication electronically, please contact [email or web address] or [phone] to request [employer] Link access.    [employer] Link is a tool which provides read-only access to select patient information with whom you have a relationship. Its easy to use and provides real time access to review your patients record including encounter summaries, notes, results, and demographic information.    If you feel you have received this communication in error or would no longer like to receive these types of communications, please e-mail [email or web address]

## 2018-04-25 NOTE — ASSESSMENT & PLAN NOTE
Based on inpt notes seems that he was declined for social support issues  Will reassess social issues next visit though I fear they have not changed dramatically  Would like to see his t glendy decrease and his RV to continue to improve for LVAD

## 2018-04-25 NOTE — ASSESSMENT & PLAN NOTE
Seems that he was doing better on entresto previously  Has had trouble getting coverage  For now will start valsartan WE can follow weekly labs for inotropes to make sure K / Creatinine stable

## 2018-04-25 NOTE — PROGRESS NOTES
Subjective: stable on inotropes     Patient ID:  Levon Arizmendi is a 28 y.o. male who presents for follow-up of Congestive Heart Failure    HPI  NiCMP ( LVEF 20%, LVEDD 7.4 April 2018) who is being followed for HF and possible advanced surgical options.      Interval History Since his last visit in Nov 2017 with Dr Cheney He had an ICD placed. Additionally he was [place] from [place] to [facility] in late March 2018.  During that admit, he was started on  5 for  diuresis and low output.  Liver US done unremarkable in setting of elevated bilirubin. Was down trending at time of discharge. GDMT with aldactone 25 mg daily. No addition of ACE due to hypotension. Patient recently was evicted and unfortunately possibly homeless therefore we were unable to offer LVAD or OHTx.    Today reports that he is living with his brother. He did not come today because he works but can come next time He has been having trouble coordinating his cared with [place] as the often change his oral meds per pt (Suspect some of this is related to lack of common medical record and there lack of experience with advanced CHF)  Able to walk to lobby without issues.  T glendy remains elevated 3.8) but lower than discharge (4.3). Minimal edema with a impressive 24 kg decrease in weight compared to Nov 2017 clinic visit    Review of Systems   Constitution: Negative for decreased appetite, weight gain and weight loss.   Cardiovascular: Negative for chest pain, dyspnea on exertion, leg swelling, near-syncope, orthopnea and palpitations.   Respiratory: Negative for cough and shortness of breath.    Musculoskeletal: Negative for myalgias.   Gastrointestinal: Negative for jaundice.        Objective:    Physical Exam   Constitutional: He is oriented to person, place, and time. He appears well-developed and well-nourished. He is active. He is not intubated.   /89 (BP Location: Left arm, Patient Position: Sitting, BP Method: Large  "(Automatic))   Pulse 108   Ht 5' 3" (1.6 m)   Wt 94.2 kg (207 lb 10.8 oz)   BMI 36.79 kg/m²      HENT:   Head: Normocephalic and atraumatic. Hair is normal.   Right Ear: External ear normal.   Left Ear: External ear normal.   Nose: Nose normal. No nasal deformity. No epistaxis.  No foreign bodies.   Mouth/Throat: Mucous membranes are normal. Mucous membranes are not cyanotic. No oropharyngeal exudate.   Eyes: Conjunctivae and EOM are normal. Pupils are equal, round, and reactive to light.   Neck: Neck supple. No hepatojugular reflux and no JVD present.   Cardiovascular: Normal rate, regular rhythm and normal pulses.  Exam reveals no gallop.    Murmur heard.  High-pitched blowing decrescendo early systolic murmur is present with a grade of 3/6  at the apex  Pulmonary/Chest: Effort normal and breath sounds normal. No apnea and no tachypnea. He is not intubated. No respiratory distress. He exhibits no tenderness.   Abdominal: Soft. Normal appearance and bowel sounds are normal. There is no tenderness. No hernia.   Musculoskeletal: Normal range of motion.   Neurological: He is alert and oriented to person, place, and time. He displays no seizure activity.   Skin: Skin is warm, dry and intact. No rash noted. No pallor.   Psychiatric: He has a normal mood and affect. His speech is normal and behavior is normal. Thought content normal. Cognition and memory are normal.     Lab Results   Component Value Date    BNP 2,921 (H) 04/25/2018     (L) 04/25/2018    K 3.3 (L) 04/25/2018    MG 2.0 04/12/2018    CL 98 04/25/2018    CO2 25 04/25/2018    BUN 15 04/25/2018    CREATININE 1.0 04/25/2018    GLU 99 04/25/2018    AST 32 04/25/2018    ALT 28 04/25/2018    ALBUMIN 2.9 (L) 04/25/2018    PROT 7.4 04/25/2018    BILITOT 3.8 (H) 04/25/2018       Magnesium   Date Value Ref Range Status   04/12/2018 2.0 1.6 - 2.6 mg/dL Final       Lab Results   Component Value Date    WBC 8.07 04/25/2018    HGB 8.4 (L) 04/25/2018    HCT 25.6 " (L) 04/25/2018    MCV 78 (L) 04/25/2018     04/25/2018         BNP   Date Value Ref Range Status   04/25/2018 2,921 (H) 0 - 99 pg/mL Final     Comment:     Values of less than 100 pg/ml are consistent with non-CHF populations.   04/09/2018 998 (H) 0 - 99 pg/mL Final     Comment:     Values of less than 100 pg/ml are consistent with non-CHF populations.   03/30/2018 2,704 (H) 0 - 99 pg/mL Final     Comment:     Values of less than 100 pg/ml are consistent with non-CHF populations.             Assessment:       1. Organ transplant candidate    2. Chronic systolic congestive heart failure, NYHA class 3    3. NOT a LVAD / Heart transplant candidate    4. Diuretic-induced hypokalemia    5. History of pulmonary embolism         Plan:   Chronic systolic congestive heart failure, NYHA class 3  Seems that he was doing better on entresto previously  Has had trouble getting coverage  For now will start valsartan WE can follow weekly labs for inotropes to make sure K / Creatinine stable       NOT a LVAD / Heart transplant candidate  Based on inpt notes seems that he was declined for social support issues  Will reassess social issues next visit though I fear they have not changed dramatically  Would like to see his t glendy decrease and his RV to continue to improve for LVAD     Diuretic-induced hypokalemia  Will start 20 meq KCL daily for k 3.3 today (already on aldactone 25 qd)     History of pulmonary embolism  HCT dropping MAy need workup for blood loss      Follow-up in about 1 month (around 5/25/2018) for with Shravan.  Orders Placed This Encounter    Brain natriuretic peptide    Comprehensive metabolic panel    CBC auto differential    Ambulatory consult to Transplant     potassium chloride SA (K-DUR,KLOR-CON) 20 MEQ tablet    valsartan (DIOVAN) 40 MG tablet

## 2018-05-02 NOTE — PROGRESS NOTES
Subjective: stable on inotropes     Patient ID:  Levon Arizmendi is a 28 y.o. male who presents for follow-up of No chief complaint on file.    HPI   28 y.o. AAM w/NiCMP ( LVEF 20%, LVEDD 7.4 April 2018) who is being followed for HF. He was felt to currently not be a surgical candidate due obesity as well as poor psycho-social situation. He was not officially worked up.     Interval History Since his last visit in Nov 2017 with Dr Cheney He had an ICD placed. Additionally he was transferred from [facility] to [facility] in late March 2018.  During that admit, he was started on  5 for diuresis and low output.  Liver US done unremarkable in setting of elevated bilirubin. Was down trending at time of discharge. GDMT with aldactone 25 mg daily. No addition of ACE due to hypotension. Patient recently was evicted and unfortunately possibly homeless therefore we were unable to offer LVAD or OHTx.  He was last seen by my Partner Dr. Lizarraga 2 weeks ago. At that time, he reported living with his brother. He was able to walk to lobby without issues. His T glendy was still elevated (3.8) but lower than discharge (4.3). Minimal edema with a impressive 24 kg decrease in weight compared to Nov 2017 clinic visit.     Today, he reports feeling okay. No change from last visit other than his appetite has increased. He denies f/c, no n/v/d, no CP; NYHA Class III; No PND or orthopnea. He presents with family today--. They report he is still very sedentary. Pt also reports that he never filled his Valsartan that Dr. Lizarraga prescribed last visit.     Review of Systems   Constitution: Positive for weight gain. Negative for decreased appetite and weight loss.   Cardiovascular: Positive for dyspnea on exertion and leg swelling. Negative for chest pain, near-syncope, orthopnea, palpitations, paroxysmal nocturnal dyspnea and syncope.   Respiratory: Negative for cough and shortness of breath.    Musculoskeletal: Negative for myalgias.  "  Gastrointestinal: Negative for bloating, abdominal pain, constipation, diarrhea, jaundice, nausea and vomiting.        Objective: /71 (BP Location: Left arm, Patient Position: Sitting, BP Method: Large (Automatic))   Pulse 103   Ht 5' 3" (1.6 m)   Wt 95.5 kg (210 lb 8.6 oz)   BMI 37.30 kg/m²      Physical Exam   Constitutional: He is oriented to person, place, and time. He appears well-developed and well-nourished. He is active. He is not intubated.   HENT:   Head: Normocephalic and atraumatic. Hair is normal.   Right Ear: External ear normal.   Left Ear: External ear normal.   Nose: Nose normal. No nasal deformity. No epistaxis.  No foreign bodies.   Mouth/Throat: Mucous membranes are normal. Mucous membranes are not cyanotic. No oropharyngeal exudate.   Eyes: Conjunctivae and EOM are normal. Pupils are equal, round, and reactive to light.   Neck: Neck supple. JVD (JVP ~14cm above RA) present. No hepatojugular reflux present.   Cardiovascular: Normal rate, regular rhythm and normal pulses.  Exam reveals no gallop.    Murmur heard.  High-pitched blowing decrescendo early systolic murmur is present with a grade of 3/6  at the apex  Pulmonary/Chest: Effort normal and breath sounds normal. No apnea and no tachypnea. He is not intubated. No respiratory distress. He exhibits no tenderness.   Abdominal: Soft. Normal appearance and bowel sounds are normal. There is no tenderness. No hernia.   Musculoskeletal: Normal range of motion. He exhibits edema (2+ to knees bilaterally).   Neurological: He is alert and oriented to person, place, and time. He displays no seizure activity.   Skin: Skin is warm, dry and intact. No rash noted. No pallor.   Psychiatric: He has a normal mood and affect. His speech is normal and behavior is normal. Thought content normal. Cognition and memory are normal.     Lab Results   Component Value Date    BNP 3,487 (H) 05/02/2018     05/02/2018    K 2.8 (L) 05/02/2018    MG 2.0 " 04/12/2018    CL 98 05/02/2018    CO2 26 05/02/2018    BUN 20 05/02/2018    CREATININE 1.3 05/02/2018     05/02/2018    AST 29 05/02/2018    ALT 21 05/02/2018    ALBUMIN 3.0 (L) 05/02/2018    PROT 7.1 05/02/2018    BILITOT 4.4 (H) 05/02/2018       Magnesium   Date Value Ref Range Status   04/12/2018 2.0 1.6 - 2.6 mg/dL Final       Lab Results   Component Value Date    WBC 5.58 05/02/2018    HGB 8.6 (L) 05/02/2018    HCT 26.2 (L) 05/02/2018    MCV 79 (L) 05/02/2018     05/02/2018         BNP   Date Value Ref Range Status   05/02/2018 3,487 (H) 0 - 99 pg/mL Final     Comment:     Values of less than 100 pg/ml are consistent with non-CHF populations.   04/25/2018 2,921 (H) 0 - 99 pg/mL Final     Comment:     Values of less than 100 pg/ml are consistent with non-CHF populations.   04/09/2018 998 (H) 0 - 99 pg/mL Final     Comment:     Values of less than 100 pg/ml are consistent with non-CHF populations.             Assessment:       1. Chronic systolic congestive heart failure, NYHA class 3    2. COCM (congestive cardiomyopathy)    3. Essential hypertension    4. Diuretic-induced hypokalemia    5. Morbid obesity due to excess calories    6. Organ transplant candidate         Plan:   Chronic combined HF, ACC Stage D, NYHA Class IIIb  - Pt appears volume-up on exam today but compensated.  - Scr stable today at Scr 1.3  - HR worse today but he is on . BP at goal  - Will start Valsartan 40mg daily  - Increase Potassium to 40meq twice daily--take 80meq this evening  - Increase Spironolactone to 25mg twice daily  - Will asked to take an extra lasix (80mg 3 times daily for 3 days) then continue lasix to 80mg BID thereafter  - labs next week  - RTC 2-3 weeks with CPX and 6MWT  - Pt has lost wt and current Body mass index is 37.3 kg/m².  - he is to meet with ERICH today for full psychosocial    Follow-up in about 2 weeks (around 5/16/2018).    Patient is now NYHA III ACC stage D  Recommend 2 gram sodium  restriction and 1500cc fluid restriction.  Encourage physical activity with graded exercise program.  Requested patient to weigh themselves daily, and to notify us if their weight increases by more than 3 lbs in 1 day or 5 lbs in 1 week.      Transplant Candidacy: Marginal at best; will f/u risk stratification and psychosocial     UNOS Patient Status  Functional Status: 50%   Physical Capacity: No Limitations  Working for Income: Unknown     Jose J Cheney MD

## 2018-05-02 NOTE — PATIENT INSTRUCTIONS
- Will start Valsartan 40mg daily  - Increase Potassium to 40meq twice daily--take 80meq total today  - Increase Spironolactone to 25mg twice daily  - Will asked to take an extra lasix (80mg 3 times daily for 3 days) then continue lasix to 80mg BID thereafter  - Labs next week

## 2018-05-02 NOTE — PROGRESS NOTES
Left Ventricular Assist Device (LVAD) and Transplant Recipient Adult Psychosocial Assessment    Levon Thomasers  [address]  [address]  [place] [address]  (Pt was recently evicted from his apartment and is living with his brother Tino Arizmendi.)    Telephone Information:   Mobile [phone]   Home  [phone] (home)  Work  There is no work phone number on file.  E-mail  [email or web address]    Sex: male  YOB: 1990  Age: 28 y.o.    Encounter Date: 2018  [place] Citizen: yes  Primary Language: English   Needed: no    Emergency Contact:  Name: Zenia Arizmendi  Relationship: brother  [place]  Phone Numbers:  [phone] (mobile)    Family/Social Support:   Number of dependents/: Pt reports no dependents.  Marital history: Pt reports no children.  Other family dynamics: Pt reports having 2 brothers Tino and Zenia and 1 sister Lyle. Pt reports brothers are supportive and sister is not. Pt's ex sister in law, Loren Campo, is present today and reports willing to assist pt. Pt reports parents are .     Household Composition:  Pt is currently living with his brother, Tino, and Tino's girlfriend and sons: Katt Cartagena (age 8), and Westley (age 14).   Do you and your caregivers have access to reliable transportation? no. Pt uses medicaid transport for transportation to appointments and pt's family provides assistance with transport around [place]. Pt's family reports they would have difficulty bringing pt to the hospital if called in for INR or other last minute appointment.     PRIMARY CAREGIVER: Pt is not sure who caregiver will be at this time. Pt's ex sister in law, Loren campo [phone], reports she will likely be caregiver. Pt's brother and ex sister in law report plan to discuss caregiver plan with family.      provided in-depth information to Patient and Caregiver regarding  regarding pre- and  "post-LVAD and pre- and post-transplant caregiver role.   strongly encourages Patient and Caregiver to have concrete plan regarding post-transplant care giving, including back-up caregiver(s) to ensure care giving needs are met as needed.    Patient and Caregiver states understanding all aspects of caregiver role/commitment.      Patient and Caregiver verbalizes understanding of the education provided today and caregiver responsibilities.       remains available. Patient and Caregiver agree to contact  in a timely manner if concerns arise.      Able to take time off work without financial concerns: unknown.     Additional Significant Others who will Assist with LVAD/Transplant:  Pt is not sure who will be able to assist at this time.     Living Will: no  Healthcare Power of : no  Advance Directives on file: <<no information> per medical record.  Verbally reviewed LW/HCPA information.   provided patient with copy of LW/HCPA documents and provided education on completion of forms    Living Donors: N/A    Highest Education Level: High School (9-12) or GED  Reading Ability: 12th grade  Reports difficulty with: seeing and memory. Pt reports needing to wear glasses to see. Pt's family reports pt has a poor memory. Pt reports no difficulty learning, however, pt also reports being on disability for "being a slow learner" since childhood.   Learns Best By:  Hands on     Status: no  VA Benefits: no     Working for Income: No  If no, reason not working: Disability  Spouse/Significant Other Employment: N/A    Disabled: yes: date disability began: in childhood, due to: learning disabilities.    Monthly Income:  SSI: $675  Food [place]: $173  Able to afford all costs now and if transplanted or receives LVAD, including medications: yes. Pt reports ability to afford all living expenses. Pt reports currently living with brother after being evicted from apartment due " to inability to afford rent increase from $500 a month to $530 a month. Pt has Medicaid, so medical costs should be minimal.  Pt reports secure power source? yes  Pt reports ability to afford monthly electric bill? yes  Pt reports ability to afford LVAD dressing supplies? yes  Patient and Caregiver verbalizes understanding of personal responsibilities related to LVAD and transplant costs and the importance of having a financial plan to ensure that patients LVAD and transplant costs are fully covered.       provided fundraising information/education.  Patient and Caregiver verbalizes understanding.   remains available.    Insurance:   Payor/Plan Subscr  Sex Relation Sub. Ins. ID Effective Group Num   1. MEDICAID [place]* ANTONIO WRIGHT 1990 Male  [id number]* 3/1/03                                    [address]     Primary Insurance (for UNOS reporting): Public Insurance - Medicaid  Secondary Insurance (for UNOS reporting): None  Patient and Caregiver verbalizes clear understanding that patient may experience difficulty obtaining and/or be denied insurance coverage post-surgery. This includes and is not limited to disability insurance, life insurance, health insurance, burial insurance, long term care insurance, and other insurances.      Patient and Caregiver also reports understanding that future health concerns related to or unrelated to LVAD or transplantation may not be covered by patient's insurance.  Resources and information provided and reviewed.      Patient and Caregiver provides verbal permission to release any necessary information to outside resources for patient care and discharge planning.  Resources and information provided are reviewed.      Dialysis History:N/A  Infusion Service: patient utilizing? yes. Pt current with [facility] ([phone]) full service.   Home Health: patient utilizing? no  DME: no  Pulmonary/Cardiac Rehab: no  ADLS:  Pt reports independent in ADLs and  "does not drive.    Adherence:   Pt reports high level of adherence to taking medications as prescribed and attending appointments. Pt reports medium level of adherence to low sodium diet.  Adherence education and counseling provided.     Per History Section:  Past Medical History:   Diagnosis Date    Cardiomyopathy     Hyperlipidemia     Hypertension     Obesity     Systolic heart failure secondary to idiopathic cardiomyopathy      Social History   Substance Use Topics    Smoking status: Never Smoker    Smokeless tobacco: Never Used    Alcohol use No     History   Drug Use No     History   Sexual Activity    Sexual activity: Not Currently       Per Today's Psychosocial:  Tobacco: none, patient denies any use.  Alcohol: none, patient denies any use.  Illicit Drugs/Non-prescribed Medications: none, patient denies any use.    Patient and Caregiver states clear understanding of the potential impact of substance use as it relates to LVAD and transplant candidacy and is aware of possible random substance screening.  Substance abstinence/cessation counseling, education and resources provided and reviewed.     Arrests/DWI/Treatment/Rehab: patient denies    Psychiatric History:    Mental Health: pt denies  Psychiatrist/Counselor: Pt reports speaking with [facility] at [facility] recently after threatening to "cut my legs off" because "they were hurting." Pt reports the threats were serious, and "If I could have gotten my hands on a chain saw, I would have cut my legs off." Pt reports he was not suicidal when making these threats.   Medications:  Pt denies.  Suicide/Homicide Issues: Pt denies current SI and HI. Pt reports past HI when being evicted from last apartment. Pt reports thoughts of blowing up [employer] car with a lit match. Pt reports no longer having active HI.    Safety at home: Pt reports safe in his current living situation.    SW recommending pt be evaluated by a psychiatrist before moving forward with " LVAD/TX evaluation. SW discussed concerns and recommendations with Dr. Lizarraga and Dr. Cheney.     Knowledge: Patient and Caregiver states having clear understanding and realistic expectations regarding the potential risks and potential benefits LVAD implantation and organ transplantation and organ donation and agrees to discuss with health care team members and support system members, as well as to utilize available resources and express questions and/or concerns in order to further facilitate the pt informed decision-making.  Resources and information provided and reviewed.     Patient and Caregiver is aware of [facility]'s affiliation and/or partnership with agencies in home health care, LTAC, SNF, [facility], and other hospitals and clinics.    Understanding: Patient and Caregiver reports having a clear understanding of the many lifetime commitments involved with being an LVAD and transplant recipient, including costs, compliance, medications, lab work, procedures, appointments, concrete and financial planning, preparedness, timely and appropriate communication of concerns, abstinence (ETOH, tobacco, illicit non-prescribed drugs), adherence to all health care team recommendations, support system and caregiver involvement, appropriate and timely resource utilization and follow-through, mental health counseling as needed/recommended, and patient and caregiver responsibilities.  Social Service Handbook, resources and detailed educational information provided and reviewed.  Educational information provided.    Patient and Caregiver also reports current and expected compliance with health care regime and states having a clear understanding of the importance of compliance.       Patient and Caregiver reports a clear understanding that risks and benefits may be involved with LVAD heart failure treatment and organ transplantation and with organ donation.     Patient and Caregiver also reports clear understanding that psychosocial  "risk factors may affect patient, and include but are not limited to feelings of depression, generalized anxiety, anxiety regarding dependence on others, post traumatic stress disorder, feelings of guilt and other emotional and/or mental concerns, and/or exacerbation of existing mental health concerns.  Detailed resources provided and discussed.      Patient and Caregiver agrees to access appropriate resources in a timely manner as needed and/or as recommended, and to communicate concerns appropriately.     Patient and Caregiver also reports a clear understanding of treatment options available.     Feelings or Concerns: Pt reports no concerns at this time.     Coping: Pt reports coping adequately with prayer.    Goals: Pt reports goals of buying a house, going back to school, and winning the powerball.      Interview Behavior: Patient and Caregivers present as alert and oriented x 4, communicative and cooperative.           Transplant Social Work - Candidacy  Assessment/Plan:     Psychosocial Suitability: Patient presents as an unacceptable candidate for LVAD or transplant at this time. Based on psychosocial risk factors, patient presents as high risk, due to no caregiver plan, limited income, unreliable transportation, poor memory, pt's reported history of being a "slow learner," and pt's reports of recent homicidal ideations and self harm thoughts.     Recommendations/Additional Comments: SW recommending neurocognitive evaluation to determine pt's ability to care for self, psychiatric evaluation to determine mental stability and pt's risk of harm to self and others (see mental health section). SW recommending pt's family develop a caregiver plan with primary and back up caregiver. SW recommending pt identify a friend or family member who will be able to assist with transport. SW also recommending pt begin fundraising.     Pt's brother and sister in law report plan to have a family meeting to discuss pt's care. "     SW providing psychosocial and counseling support, education, assistance, and resources as indicated. SW remains available.       Olga Estrella, ABDIELW

## 2018-05-10 NOTE — TELEPHONE ENCOUNTER
1150 am Received call from Michelle Rn with [facility] reporting pt has a 8.6lb wt gain lungs clear, denies increased sob, VSS, but L lower leg is very swollen,and measures 46cm vs R lower leg measuring 37cm. Denies pain in leg. Negative  Cellulitis in the left lower leg before. Currently no redness or warmness at site noted.  Pt says he has not been established with a pcp in a very long time. Advised Michelle to have  to go to neareat ER, and to please contact [facility] with update. Need to confirm that wt gain has been address and ER eval. Michelle say she has access to [facility] records and will check on pt and status.    1510 Received call from Michelle informing me that she received critical K result and faxed to me K is 2.5. Michelle says she spoke to pt and informed him and pt says he was on his way to ER. I also spoke to pt and informed him of urgency and pt sated is was almost at the ER. I asked pt to please call [facility] nurse with update so that we can be aware of what took place. Pt verbalized understanding.

## 2018-06-01 NOTE — TELEPHONE ENCOUNTER
Received labs yesterday after 430pm. Pts K was 2.8. Samantha TRAN spoke to pt, and instructed him per Dr. Lizarraga to go to ER. Pt verbalized understanding. Spoke to pt this am to ask about ER visit. Pt says he was given an IV bag of potassium, no pills. Pt says there were no labs drawn after he received the potassium. He was then sent home. I asked pt if he'd taken his potassium yet this am. Pt says he is out of his potassium pills as of yesterday, because the pharmacy told him he couldn't get a refill because he picked a script early May. Pt says he told him he didn't but he also had a potassium powder that he was using so he didn't need all of his pills, so he's sure he didn't  his prescription early May. I reminded pt that he should never run of of his meds, and if there is a problem to please notify chf nurse asap. CHF RED Wallace spoke to [facility] and requested labs from last night be faxed stat. Received labs K was 3.0 after IV potassium. Called script to [facility] about 930a. Just spoke to pt and asked pt to call me when he has the potassium. Pt say shes going to call [facility] now, and agreed to call me as soon as he has medicine so that I can give him instructions on how much to take per Dr. Cheney..

## 2018-06-05 NOTE — TELEPHONE ENCOUNTER
"On 6/1/18 Per Dr. Cheney, pts to take potassium 20meq (4 tablets for a total of 80meq now), then 20meq (2) tabs three times a day. I was finally able to reach pt at about 215pm. He says he was at a gas station and on his way to pharmacy. I instructed to take potassium exactly as I instructed  4 tablets now then since his K is low and its already later in the afternoon, to take 2 tabs in 4 hours, then then 2 tablets at bedtime, and  if he's able to wake up 4 hours late he can take the other 2 tablets. I asked pt to go to [facility] on Monday am for labs and reminded him how important it is to be sure to be a [facility] Monday am for labs. V. O for bmp  called to  Michelle Welsh . Michelle mentioned that she had called pt the night prior to fu on his ER visit and pt didn't answer but did call her back and told her that he was given a bag of fluids, and sent home. Pt strongly urges that he is compliant with his medications, and says he urinates a "whole lot" all day long. Michelle says pt told her he didn't make appt here on time so he stayed in [place] with family. had that she sees an appt in [facility] system that was on 5/28 with a cardiologist Dr. Terry Tim. I asked pt about this appt, and pt stated he was unaware he had this appt ,and that he's not established with a local Cardiologist. Informed pt that we'd discus him establishing with a local card next week when we got labs back.  Updated potassium script to [facility] to be sure get appropriate amount of tablets. Called pt and pt was still in bed. I asked pt to please make it a priority when he gets out of bed to call [facility] and establish and appt with a cardiologist. I asked pt to please tell me how his taking his potassium. Pt says he's taking 2 tablets by mouth 3 times a day. Also asked pt to please let Jackie Welsh at [facility] know who that MD is and when his appt is when he goes to get his dressing changed on 6/14/18. Pt verbalized understanding. Spoke to [facility] this am " and they confirmed labs were drawn yesterday and they will send results as soon as they get them.

## 2018-06-07 NOTE — TELEPHONE ENCOUNTER
6/4/18 Call [facility] spoke with  Michelle RN she informed me that she's been trying to get in contact with pt be hasn't got any answer, she's said that pt is sending her calls to the . Michelle said she's going to keep trying to get in touch with him for he can come in and get labs drawn.    6/7/18 Spoke to Michelle she informed me that she got in touch with pt and he informed her that he is coming in to get his labs drawn today

## 2018-06-21 NOTE — TELEPHONE ENCOUNTER
6/14/18 Spoke to Michelle with [facility] she inform me the pt was in local hospital in ICU    6/21/18 Called back to today and spoke to Marizol and she inform me that pt is still in the hospital

## 2018-06-28 NOTE — PROGRESS NOTES
ERICH received phone call from pt brother, Zenia. He wanted to know why pt is not listed for transplant. This has been discussed at length with pt, as recent as yesterday when pt spoke to his heart failure nurse Kasia.     ERICH educated pt brother on the process and stated pt needs to have a clear caregiving plan, transportation and would need to follow up with recommendations made by ERICH/HTS team in order to proceed.     ERICH has recommended neuropsych testing and psychiatry. Pt reported he is a 'slow learner'. Please reference complete psychosocial for further information.    Pt is currently hospitalized in [place]. SW educated brother that they can request that [place] have him transferred to [facility] and that pt's doctor in [place] would need to initiate potential transfer. Also educated that pt may not be accepted as he is not in eval for advanced options.    Pt was in the ICU but is now in a regular room.     ERICH also educated that once pt is discharged they can schedule a clinic visit at [facility] to meet with MD/ERICH again regarding concerns and barriers to LVAD/transplant.    ERICH provided education and resources. ERICH continues to follow and remains available.

## 2018-07-27 NOTE — TELEPHONE ENCOUNTER
6/14 Spoke to Michelle  Nurse she informed me the pt is in the hospital    6/21 pt is still in the hospital    6/28 pt is still in the hospital    7/5 Spoke to Michelle  Nurse pt was Discharger and brought back into the hospital     7/12 Called the  to see if pt was getting has lab drawn I was told pt was still in the hospital     7/19 pt is still in the hospital     7/23 Spoke to Michelle  nurse she informed me pt isnt doing good at all. They talk to the family about hospice and family denied it, the family want pt to go to a nursing home. Michelle with  said she will keep me posted on everything that's going on with the pt

## 2018-08-02 NOTE — TELEPHONE ENCOUNTER
8/2/18 Spoke to Courtney pt is still in the hospital and has been d/c from [facility]

## 2018-08-10 NOTE — TELEPHONE ENCOUNTER
Spoke to Michelle [facility] at [facility] and she informed me that pts still in  [facility], family says they cant care for him,pts refusing hospice, and nursing homes refused to admit him because he is too much of a responsibility as far as how ill he is. Pts lost 70lbs while hospitalized.

## 2018-09-07 NOTE — TELEPHONE ENCOUNTER
Called pt left message on his phone also on  his caretaker phone to please call the HF office for we can discuss his labs

## 2018-10-01 NOTE — TELEPHONE ENCOUNTER
On 9/20/18 Spoke to Michelle Welsh at [facility]  And confirmed that pts care has been assumed by [facility] and pts followed pt an NP there. The NPs are changing so Michelle couldnt confirm which NP is following pt because a new NP is coming in, but Pt is being followed there. Pt will  No longer be managed at [facility].

## 2021-04-27 NOTE — PROGRESS NOTES
04/07/18 2058 04/07/18 2342   Vital Signs   BP (!) 82/44 (!) 85/47   MAP (mmHg) 55 60   BP Location Right arm Right arm   Patient Position Lying Lying     Notified HTS on call, Dr. Ozuna of patient's recent vitals. Patient is on  and lasix gtt that were both increased today to increase diuresis. Patient is asymptomatic and resting comfortably. No new orders were given. Will continue to monitor.    [id number]

## 2023-03-01 NOTE — PROGRESS NOTES
" [facility]  Adult Nutrition  Progress Note    SUMMARY       Recommendations    Recommendation/Intervention:   1. Continue current cardiac diet. Pt with good intake at this time.   2. RD following.    Goals: Intake >/=85% EEN/EPN  Nutrition Goal Status: new  Communication of RD Recs:  (POC)    Reason for Assessment    Reason for Assessment: length of stay  Diagnosis: cardiac disease (CHF)  Relevant Medical History: HTN, HLD, CM, CHF    General Information Comments: Pt reports good appetite at this time as well as PTA though he states he does not eat much at home. Pt reports UBW of 283 lb confirmed by chart review x 7 months. Pt believes it is true wt loss though was taking lasix during that time. Chart review indicates pt with ~80lb wt loss due to diuresis since admit which seems to be confirmed by I/O's.    Nutrition Discharge Planning: Adequate PO intake on cardiac diet    Nutrition Risk Screen    Nutrition Risk Screen: no indicators present    Nutrition/Diet History    Patient Reported Diet/Restrictions/Preferences: low salt  Do you have any cultural, spiritual, Congregational conflicts, given your current situation?: none reported  Factors Affecting Nutritional Intake: None identified at this time    Anthropometrics    Temp: 98.3 °F (36.8 °C)  Height Method: Stated  Height: 5' 3" (160 cm)  Height (inches): 63 in  Weight Method: Standard Scale  Weight: 88 kg (194 lb 0.1 oz)  Weight (lb): 194.01 lb  Ideal Body Weight (IBW), Male: 124 lb  % Ideal Body Weight, Male (lb): 156.46 lb  BMI (Calculated): 34.4  BMI Grade: 30 - 34.9- obesity - grade I  Usual Body Weight (UBW), k.6 kg (2017 per chart review)  Weight Change Amount:  (likely mostly fluid loss with some true weight loss)  % Usual Body Weight: 68.57  % Weight Change From Usual Weight: -31.57 %       Lab/Procedures/Meds    Pertinent Labs Reviewed: reviewed  Pertinent Labs Comments: Na 132, BUN 34, Alb 2.0, T.Bili 7.0  Pertinent Medications " Reviewed: reviewed  Pertinent Medications Comments: Mg, pantoprazole, spironolactone, dobutamine, lasix    Physical Findings/Assessment    Overall Physical Appearance: obese  Oral/Mouth Cavity: WDL  Skin: intact    Estimated/Assessed Needs    Weight Used For Calorie Calculations: 88 kg (194 lb 0.1 oz)  Energy Calorie Requirements (kcal): 2094  Energy Need Method: [place]-St Jeor (x 1.2 (PAL))  Protein Requirements:  gm (1.0-1.2 gm/kg)  Weight Used For Protein Calculations: 88 kg (194 lb 0.1 oz)  Fluid Requirements (mL): per MD     RDA Method (mL): 2094       Nutrition Prescription Ordered    Current Diet Order: Cardiac  Nutrition Order Comments: 2L FR    Evaluation of Received Nutrient/Fluid Intake    I/O: -2.8L x 24 hrs (-36.8L since admit)  Comments: LBM 4/9  % Intake of Estimated Energy Needs: 75 - 100 %  % Meal Intake: 75 - 100 %    Nutrition Risk    Level of Risk/Frequency of Follow-up:  (F/u 1x weekly)     Assessment and Plan    * Acute decompensated heart failure    Contributing Nutrition Diagnosis  Unintended weight loss    Related to (etiology):   Diuresis and decreased PO intake    Signs and Symptoms (as evidenced by):   Wt loss of  31% UBW and -36.8L since admit    Interventions/Recommendations (treatment strategy):  See recs.    Nutrition Diagnosis Status:   New           Monitor and Evaluation    Food and Nutrient Intake: energy intake, food and beverage intake  Food and Nutrient Adminstration: diet order  Knowledge/Beliefs/Attitudes: food and nutrition knowledge/skill  Physical Activity and Function: nutrition-related ADLs and IADLs  Anthropometric Measurements: weight, weight change, body mass index  Biochemical Data, Medical Tests and Procedures: electrolyte and renal panel, gastrointestinal profile, glucose/endocrine profile, inflammatory profile  Nutrition-Focused Physical Findings: overall appearance     Nutrition Follow-Up    RD Follow-up?: Yes     Additional Notes: Apply aquaphor on affected area on left ear Detail Level: Simple Render Risk Assessment In Note?: no

## 2025-06-25 NOTE — TELEPHONE ENCOUNTER
5/31/18 - Received labs K+ 2.8.  Discussed/Reviewed with RUY Lizarraga M.D.  VORB: RUY Lizarraga M.D./MICHAEL Hernandez RN: Have patient report to ED for evaluation and treatment of Hypokalemia.  Called and spoke with patient and instructed him to report to ED.  Patient verbalized understanding and said he will go to [facility].  I verbalized understanding.   Mallampati -2  Denies dentures. Denies loose teeth.